# Patient Record
Sex: FEMALE | Race: WHITE | NOT HISPANIC OR LATINO | Employment: OTHER | URBAN - METROPOLITAN AREA
[De-identification: names, ages, dates, MRNs, and addresses within clinical notes are randomized per-mention and may not be internally consistent; named-entity substitution may affect disease eponyms.]

---

## 2017-10-10 ENCOUNTER — LAB VISIT (OUTPATIENT)
Dept: LAB | Facility: HOSPITAL | Age: 57
End: 2017-10-10
Attending: FAMILY MEDICINE
Payer: MEDICARE

## 2017-10-10 ENCOUNTER — OFFICE VISIT (OUTPATIENT)
Dept: INTERNAL MEDICINE | Facility: CLINIC | Age: 57
End: 2017-10-10
Payer: MEDICARE

## 2017-10-10 VITALS
WEIGHT: 251.56 LBS | SYSTOLIC BLOOD PRESSURE: 144 MMHG | TEMPERATURE: 97 F | DIASTOLIC BLOOD PRESSURE: 96 MMHG | BODY MASS INDEX: 40.43 KG/M2 | HEIGHT: 66 IN

## 2017-10-10 DIAGNOSIS — A60.00 GENITAL HERPES SIMPLEX, UNSPECIFIED SITE: ICD-10-CM

## 2017-10-10 DIAGNOSIS — M21.371 RIGHT FOOT DROP: ICD-10-CM

## 2017-10-10 DIAGNOSIS — Z91.81 RISK FOR FALLS: ICD-10-CM

## 2017-10-10 DIAGNOSIS — F41.9 ANXIETY: ICD-10-CM

## 2017-10-10 DIAGNOSIS — R32 URINARY INCONTINENCE, UNSPECIFIED TYPE: ICD-10-CM

## 2017-10-10 DIAGNOSIS — G57.91 NEUROPATHY OF RIGHT FOOT: ICD-10-CM

## 2017-10-10 DIAGNOSIS — R29.898 WEAKNESS OF RIGHT LOWER EXTREMITY: ICD-10-CM

## 2017-10-10 DIAGNOSIS — F41.9 ANXIETY: Primary | ICD-10-CM

## 2017-10-10 DIAGNOSIS — B37.2 CANDIDAL DERMATITIS: ICD-10-CM

## 2017-10-10 DIAGNOSIS — N39.0 RECURRENT UTI: ICD-10-CM

## 2017-10-10 LAB
ALBUMIN SERPL BCP-MCNC: 3.6 G/DL
ALP SERPL-CCNC: 99 U/L
ALT SERPL W/O P-5'-P-CCNC: 11 U/L
ANION GAP SERPL CALC-SCNC: 9 MMOL/L
AST SERPL-CCNC: 21 U/L
BASOPHILS # BLD AUTO: 0.04 K/UL
BASOPHILS NFR BLD: 0.6 %
BILIRUB SERPL-MCNC: 0.6 MG/DL
BUN SERPL-MCNC: 11 MG/DL
CALCIUM SERPL-MCNC: 9.9 MG/DL
CHLORIDE SERPL-SCNC: 105 MMOL/L
CHOLEST SERPL-MCNC: 191 MG/DL
CHOLEST/HDLC SERPL: 4.2 {RATIO}
CO2 SERPL-SCNC: 28 MMOL/L
CREAT SERPL-MCNC: 1 MG/DL
DIFFERENTIAL METHOD: NORMAL
EOSINOPHIL # BLD AUTO: 0.3 K/UL
EOSINOPHIL NFR BLD: 4.5 %
ERYTHROCYTE [DISTWIDTH] IN BLOOD BY AUTOMATED COUNT: 14.2 %
EST. GFR  (AFRICAN AMERICAN): >60 ML/MIN/1.73 M^2
EST. GFR  (NON AFRICAN AMERICAN): >60 ML/MIN/1.73 M^2
GLUCOSE SERPL-MCNC: 84 MG/DL
HCT VFR BLD AUTO: 39.4 %
HDLC SERPL-MCNC: 46 MG/DL
HDLC SERPL: 24.1 %
HGB BLD-MCNC: 13.5 G/DL
LDLC SERPL CALC-MCNC: 128 MG/DL
LYMPHOCYTES # BLD AUTO: 1.9 K/UL
LYMPHOCYTES NFR BLD: 29.3 %
MCH RBC QN AUTO: 30.1 PG
MCHC RBC AUTO-ENTMCNC: 34.3 G/DL
MCV RBC AUTO: 88 FL
MONOCYTES # BLD AUTO: 0.4 K/UL
MONOCYTES NFR BLD: 6.3 %
NEUTROPHILS # BLD AUTO: 3.8 K/UL
NEUTROPHILS NFR BLD: 59.3 %
NONHDLC SERPL-MCNC: 145 MG/DL
PLATELET # BLD AUTO: 270 K/UL
PMV BLD AUTO: 10.3 FL
POTASSIUM SERPL-SCNC: 4.4 MMOL/L
PROT SERPL-MCNC: 7.6 G/DL
RBC # BLD AUTO: 4.49 M/UL
SODIUM SERPL-SCNC: 142 MMOL/L
TRIGL SERPL-MCNC: 85 MG/DL
TSH SERPL DL<=0.005 MIU/L-ACNC: 2.27 UIU/ML
WBC # BLD AUTO: 6.38 K/UL

## 2017-10-10 PROCEDURE — 99999 PR PBB SHADOW E&M-NEW PATIENT-LVL III: CPT | Mod: PBBFAC,,, | Performed by: FAMILY MEDICINE

## 2017-10-10 PROCEDURE — 80061 LIPID PANEL: CPT

## 2017-10-10 PROCEDURE — 85025 COMPLETE CBC W/AUTO DIFF WBC: CPT

## 2017-10-10 PROCEDURE — 36415 COLL VENOUS BLD VENIPUNCTURE: CPT | Mod: PO

## 2017-10-10 PROCEDURE — 80053 COMPREHEN METABOLIC PANEL: CPT

## 2017-10-10 PROCEDURE — 99203 OFFICE O/P NEW LOW 30 MIN: CPT | Mod: S$GLB,,, | Performed by: FAMILY MEDICINE

## 2017-10-10 PROCEDURE — 83036 HEMOGLOBIN GLYCOSYLATED A1C: CPT

## 2017-10-10 PROCEDURE — 84443 ASSAY THYROID STIM HORMONE: CPT

## 2017-10-10 PROCEDURE — 86703 HIV-1/HIV-2 1 RESULT ANTBDY: CPT

## 2017-10-10 RX ORDER — TOLTERODINE 4 MG/1
1 CAPSULE, EXTENDED RELEASE ORAL NIGHTLY
COMMUNITY
Start: 2017-10-05 | End: 2017-10-10 | Stop reason: SDUPTHER

## 2017-10-10 RX ORDER — PREGABALIN 75 MG/1
75 CAPSULE ORAL NIGHTLY
COMMUNITY
End: 2017-10-10 | Stop reason: SDUPTHER

## 2017-10-10 RX ORDER — NAPROXEN 500 MG/1
1000 TABLET ORAL NIGHTLY
COMMUNITY
Start: 2017-10-05 | End: 2018-03-12 | Stop reason: SDUPTHER

## 2017-10-10 RX ORDER — TRIAMCINOLONE ACETONIDE 1 MG/G
CREAM TOPICAL
COMMUNITY
End: 2019-10-10

## 2017-10-10 RX ORDER — NYSTATIN AND TRIAMCINOLONE ACETONIDE 100000; 1 [USP'U]/G; MG/G
CREAM TOPICAL 2 TIMES DAILY
COMMUNITY
End: 2019-02-19 | Stop reason: SDUPTHER

## 2017-10-10 RX ORDER — PREGABALIN 75 MG/1
75 CAPSULE ORAL NIGHTLY
Qty: 90 CAPSULE | Refills: 0 | Status: SHIPPED | OUTPATIENT
Start: 2017-10-10 | End: 2017-10-20 | Stop reason: SDUPTHER

## 2017-10-10 RX ORDER — MUPIROCIN 20 MG/G
OINTMENT TOPICAL 3 TIMES DAILY
COMMUNITY
End: 2018-04-18 | Stop reason: SDUPTHER

## 2017-10-10 RX ORDER — VALACYCLOVIR HYDROCHLORIDE 1 G/1
1 TABLET, FILM COATED ORAL DAILY
COMMUNITY
Start: 2017-10-05 | End: 2017-10-10 | Stop reason: SDUPTHER

## 2017-10-10 RX ORDER — TIZANIDINE HYDROCHLORIDE 4 MG/1
2 CAPSULE, GELATIN COATED ORAL NIGHTLY
COMMUNITY
End: 2017-10-10 | Stop reason: SDUPTHER

## 2017-10-10 RX ORDER — FLUCONAZOLE 200 MG/1
200 TABLET ORAL
Qty: 24 TABLET | Refills: 0 | Status: SHIPPED | OUTPATIENT
Start: 2017-10-12 | End: 2018-02-14 | Stop reason: SDUPTHER

## 2017-10-10 RX ORDER — HYDROCORTISONE 25 MG/G
CREAM TOPICAL
COMMUNITY
End: 2019-10-10 | Stop reason: SDUPTHER

## 2017-10-10 RX ORDER — FLUCONAZOLE 200 MG/1
200 TABLET ORAL
COMMUNITY
End: 2017-10-10 | Stop reason: SDUPTHER

## 2017-10-10 RX ORDER — TIZANIDINE HYDROCHLORIDE 4 MG/1
2 CAPSULE, GELATIN COATED ORAL NIGHTLY
Qty: 180 CAPSULE | Refills: 0 | Status: SHIPPED | OUTPATIENT
Start: 2017-10-10 | End: 2018-01-22 | Stop reason: SDUPTHER

## 2017-10-10 RX ORDER — VALACYCLOVIR HYDROCHLORIDE 1 G/1
1000 TABLET, FILM COATED ORAL DAILY
Qty: 90 TABLET | Refills: 0 | Status: SHIPPED | OUTPATIENT
Start: 2017-10-10 | End: 2018-03-12 | Stop reason: SDUPTHER

## 2017-10-10 RX ORDER — NITROFURANTOIN 25; 75 MG/1; MG/1
100 CAPSULE ORAL NIGHTLY
COMMUNITY
End: 2017-10-10 | Stop reason: SDUPTHER

## 2017-10-10 RX ORDER — TOLTERODINE 4 MG/1
4 CAPSULE, EXTENDED RELEASE ORAL NIGHTLY
Qty: 90 CAPSULE | Refills: 0 | Status: SHIPPED | OUTPATIENT
Start: 2017-10-10 | End: 2018-02-07 | Stop reason: SDUPTHER

## 2017-10-10 RX ORDER — CLONAZEPAM 1 MG/1
1 TABLET ORAL NIGHTLY
COMMUNITY
Start: 2017-10-05 | End: 2017-10-10 | Stop reason: SDUPTHER

## 2017-10-10 RX ORDER — NYSTATIN 100000 [USP'U]/G
POWDER TOPICAL
COMMUNITY
End: 2018-02-14 | Stop reason: SDUPTHER

## 2017-10-10 RX ORDER — CLONAZEPAM 1 MG/1
1 TABLET ORAL NIGHTLY
Qty: 90 TABLET | Refills: 0 | Status: SHIPPED | OUTPATIENT
Start: 2017-10-10 | End: 2018-01-18 | Stop reason: SDUPTHER

## 2017-10-10 RX ORDER — NITROFURANTOIN 25; 75 MG/1; MG/1
100 CAPSULE ORAL NIGHTLY
Qty: 90 CAPSULE | Refills: 0 | Status: SHIPPED | OUTPATIENT
Start: 2017-10-10 | End: 2018-08-06 | Stop reason: SDUPTHER

## 2017-10-10 NOTE — PROGRESS NOTES
Subjective:   Patient ID: Rivka Jurado is a 57 y.o. female.  Chief Complaint:  Establish Care and Anxiety    Presents to establish care.    Reports multiple chronic medical conditions.  Needs physical therapy referral for chronic foot drop with overall generalized weakness, decreased conditioning, gait instability.  Needs 90 day prescription of all medication sent to [facility]  Request completion of form to obtain motorized wheelchair/scooter.  Needs referral to psychiatrist.        Current Outpatient Prescriptions:     clonazePAM (KLONOPIN) 1 MG tablet, Take 1 tablet (1 mg total) by mouth every evening., Disp: 90 tablet, Rfl: 0    fluconazole (DIFLUCAN) 200 MG Tab, Take 1 tablet (200 mg total) by mouth twice a week., Disp: 24 tablet, Rfl: 0    hydrocortisone (ANUSOL-HC) 2.5 % rectal cream, Place rectally as needed for Hemorrhoids., Disp: , Rfl:     mupirocin (BACTROBAN) 2 % ointment, Apply topically 3 (three) times daily., Disp: , Rfl:     naproxen (NAPROSYN) 500 MG tablet, Take 1 tablet by mouth 2 (two) times daily., Disp: , Rfl:     nitrofurantoin, macrocrystal-monohydrate, (MACROBID) 100 MG capsule, Take 1 capsule (100 mg total) by mouth every evening., Disp: 90 capsule, Rfl: 0    nystatin (MYCOSTATIN) powder, Apply topically as needed., Disp: , Rfl:     nystatin-triamcinolone (MYCOLOG II) cream, Apply topically 2 (two) times daily., Disp: , Rfl:     pregabalin (LYRICA) 75 MG capsule, Take 1 capsule (75 mg total) by mouth every evening., Disp: 90 capsule, Rfl: 0    tizanidine 4 mg Cap, Take 2 capsules by mouth every evening., Disp: 180 capsule, Rfl: 0    tolterodine (DETROL LA) 4 MG 24 hr capsule, Take 1 capsule (4 mg total) by mouth every evening., Disp: 90 capsule, Rfl: 0    triamcinolone acetonide 0.1% (KENALOG) 0.1 % cream, Apply topically as needed., Disp: , Rfl:     valacyclovir (VALTREX) 1000 MG tablet, Take 1 tablet (1,000 mg total) by mouth once daily., Disp: 90 tablet, Rfl: 0     Review of  "Systems   Constitutional: Positive for fatigue. Negative for activity change, appetite change, chills, diaphoresis, fever and unexpected weight change.   HENT: Positive for congestion, ear pain, nosebleeds (Recurrent nasal sores), postnasal drip and rhinorrhea. Negative for dental problem, ear discharge, facial swelling, hearing loss, mouth sores, sinus pain, sinus pressure, sneezing, sore throat, tinnitus, trouble swallowing and voice change.    Eyes: Negative for pain, discharge, redness, itching and visual disturbance.   Respiratory: Positive for shortness of breath. Negative for cough, chest tightness and wheezing.    Cardiovascular: Positive for leg swelling. Negative for chest pain and palpitations.   Gastrointestinal: Negative for abdominal pain, blood in stool, constipation, diarrhea, nausea and vomiting.   Endocrine: Negative for polydipsia, polyphagia and polyuria.   Genitourinary: Positive for difficulty urinating and urgency. Negative for dyspareunia, dysuria, enuresis, flank pain, frequency, genital sores and hematuria.   Musculoskeletal: Positive for arthralgias, back pain, gait problem, joint swelling, myalgias, neck pain and neck stiffness.   Skin: Negative for rash.   Neurological: Positive for weakness and numbness. Negative for dizziness, tremors, seizures, syncope, facial asymmetry, speech difficulty, light-headedness and headaches.   Hematological: Negative for adenopathy. Bruises/bleeds easily.   Psychiatric/Behavioral: Positive for decreased concentration, dysphoric mood and sleep disturbance. Negative for agitation, behavioral problems, confusion, hallucinations, self-injury and suicidal ideas. The patient is not nervous/anxious and is not hyperactive.      Objective:   BP (!) 144/96 (BP Location: Right arm, Patient Position: Sitting, BP Method: Large (Manual))   Temp 97 °F (36.1 °C) (Tympanic)   Ht 5' 5.5" (1.664 m)   Wt 114.1 kg (251 lb 8.7 oz)   BMI 41.22 kg/m²     Physical Exam "   Constitutional: She is oriented to person, place, and time. Vital signs are normal. She appears well-developed and well-nourished. No distress.   Neck: No JVD present.   Cardiovascular: Normal rate, regular rhythm and normal heart sounds.  Exam reveals no gallop and no friction rub.    No murmur heard.  Pulmonary/Chest: Effort normal and breath sounds normal. She has no wheezes. She has no rhonchi. She has no rales.   Abdominal: Soft. She exhibits no distension. There is no tenderness.   Musculoskeletal: She exhibits no edema.   Neurological: She is alert and oriented to person, place, and time. She displays atrophy. She displays no tremor. A sensory deficit is present. She exhibits abnormal muscle tone. She displays a negative Romberg sign. She displays no seizure activity. Coordination and gait abnormal.   Right Foot Drop   Skin: Skin is warm and dry. No rash noted.   Psychiatric: She has a normal mood and affect. Judgment and thought content normal. Her mood appears not anxious. Her affect is not angry, not blunt, not labile and not inappropriate. Her speech is tangential. Her speech is not rapid and/or pressured, not delayed and not slurred. She is not agitated, not aggressive, not hyperactive, not slowed, not withdrawn, not actively hallucinating and not combative. Cognition and memory are normal. She does not exhibit a depressed mood. She is communicative. She is inattentive.   Nursing note and vitals reviewed.    Assessment:     1. Anxiety    2. Recurrent UTI    3. Urinary incontinence, unspecified type    4. Right foot drop    5. Genital herpes simplex, unspecified site    6. Candidal dermatitis    7. Neuropathy of right foot    8. Risk for falls    9. Weakness of right lower extremity      Plan:   Anxiety  -     clonazePAM (KLONOPIN) 1 MG tablet; Take 1 tablet (1 mg total) by mouth every evening.  Dispense: 90 tablet; Refill: 0  -     CBC auto differential; Future; Expected date: 10/10/2017  -      Comprehensive metabolic panel; Future; Expected date: 10/10/2017  -     Lipid panel; Future; Expected date: 10/10/2017  -     TSH; Future; Expected date: 10/10/2017  -     Ambulatory referral to Psychiatry  Check labs.  Treat as indicated.  Referral to psychiatry.    Recurrent UTI  Urinary incontinence, unspecified type  -     tolterodine (DETROL LA) 4 MG 24 hr capsule; Take 1 capsule (4 mg total) by mouth every evening.  Dispense: 90 capsule; Refill: 0  -     Urinalysis; Future; Expected date: 10/10/2017        -     nitrofurantoin, macrocrystal-monohydrate, (MACROBID) 100 MG capsule; Take 1 capsule (100 mg total) by mouth every evening.  Dispense: 90 capsule; Refill: 0  -     CULTURE, URINE; Future; Expected date: 10/10/2017  Continue Detrol LA.  Continue antibiotic daily prophylaxis.  Rule out chronic underlying infection.      Right foot drop  -     pregabalin (LYRICA) 75 MG capsule; Take 1 capsule (75 mg total) by mouth every evening.  Dispense: 90 capsule; Refill: 0  -     tizanidine 4 mg Cap; Take 2 capsules by mouth every evening.  Dispense: 180 capsule; Refill: 0  -     Ambulatory Referral to Physical/Occupational Therapy  Referral to PT.  Refill medications.  Form for [a]list gamesooter filled out.    Genital herpes simplex, unspecified site  -     valacyclovir (VALTREX) 1000 MG tablet; Take 1 tablet (1,000 mg total) by mouth once daily.  Dispense: 90 tablet; Refill: 0  -     HIV-1 and HIV-2 antibodies; Future; Expected date: 10/10/2017  Stable.  No recurrent outbreaks on preventative dose.    Candidal dermatitis  -     fluconazole (DIFLUCAN) 200 MG Tab; Take 1 tablet (200 mg total) by mouth twice a week.  Dispense: 24 tablet; Refill: 0  -     Hemoglobin A1c; Future; Expected date: 10/10/2017    Neuropathy of right foot  Risk for falls  Weakness of right lower extremity  -     pregabalin (LYRICA) 75 MG capsule; Take 1 capsule (75 mg total) by mouth every evening.  Dispense: 90 capsule; Refill: 0  -      tizanidine 4 mg Cap; Take 2 capsules by mouth every evening.  Dispense: 180 capsule; Refill: 0    RTC 1 month

## 2017-10-11 ENCOUNTER — TELEPHONE (OUTPATIENT)
Dept: INTERNAL MEDICINE | Facility: CLINIC | Age: 57
End: 2017-10-11

## 2017-10-11 DIAGNOSIS — J34.89 SORE IN NOSE: Primary | ICD-10-CM

## 2017-10-11 LAB
ESTIMATED AVG GLUCOSE: 105 MG/DL
HBA1C MFR BLD HPLC: 5.3 %

## 2017-10-11 NOTE — TELEPHONE ENCOUNTER
Notified pt. Pt verbalized understanding.    Pt stated that she forgot to get Dr. Singh to put in a referral to ENT for her recurrent sinus issues and sores in her nose. Pt use to see Dr. Reed but they no longer take her insurance. Told pt that I would discuss with Dr. Singh and return her call tomorrow. Pt verbalized understanding.

## 2017-10-11 NOTE — TELEPHONE ENCOUNTER
----- Message from Kennedy Singh MD sent at 10/11/2017  7:21 AM CDT -----  Sugar, Kidney, Liver, and Electrolyte tests are all normal.  Blood Counts are normal. No significant anemia.  Thyroid testing is normal.  Cholesterol tests are normal. Your 10-year risk of a heart disease or stroke is 3.5%  Aspirin daily is not recommended. A statin cholesterol medication is not recommended.  A1c is in a normal range. No Prediabetes or Diabetes  HIV pending

## 2017-10-12 ENCOUNTER — TELEPHONE (OUTPATIENT)
Dept: INTERNAL MEDICINE | Facility: CLINIC | Age: 57
End: 2017-10-12

## 2017-10-12 LAB — HIV 1+2 AB+HIV1 P24 AG SERPL QL IA: NEGATIVE

## 2017-10-12 NOTE — TELEPHONE ENCOUNTER
----- Message from Kennedy Singh MD sent at 10/12/2017 12:58 PM CDT -----  HIV testing is negative

## 2017-10-12 NOTE — TELEPHONE ENCOUNTER
----- Message from Kennedy Singh MD sent at 10/12/2017 12:58 PM CDT -----  Urinalysis normal.  Urine culture negative.  No infection.

## 2017-10-13 PROBLEM — G57.91 NEUROPATHY OF RIGHT FOOT: Status: ACTIVE | Noted: 2017-10-13

## 2017-10-13 PROBLEM — N39.0 RECURRENT UTI: Status: ACTIVE | Noted: 2017-10-13

## 2017-10-13 PROBLEM — B37.2 CANDIDAL DERMATITIS: Status: ACTIVE | Noted: 2017-10-13

## 2017-10-13 PROBLEM — M21.371 RIGHT FOOT DROP: Status: ACTIVE | Noted: 2017-10-13

## 2017-10-13 PROBLEM — R32 URINARY INCONTINENCE: Status: ACTIVE | Noted: 2017-10-13

## 2017-10-13 PROBLEM — A60.00 GENITAL HERPES SIMPLEX: Status: ACTIVE | Noted: 2017-10-13

## 2017-10-13 PROBLEM — Z91.81 RISK FOR FALLS: Status: ACTIVE | Noted: 2017-10-13

## 2017-10-13 PROBLEM — F41.9 ANXIETY: Status: ACTIVE | Noted: 2017-10-13

## 2017-10-13 PROBLEM — R29.898 WEAKNESS OF RIGHT LOWER EXTREMITY: Status: ACTIVE | Noted: 2017-10-13

## 2017-10-18 ENCOUNTER — TELEPHONE (OUTPATIENT)
Dept: INTERNAL MEDICINE | Facility: CLINIC | Age: 57
End: 2017-10-18

## 2017-10-18 DIAGNOSIS — I10 HYPERTENSION, ESSENTIAL: Primary | ICD-10-CM

## 2017-10-18 RX ORDER — BENAZEPRIL HYDROCHLORIDE 20 MG/1
20 TABLET ORAL DAILY
Qty: 90 TABLET | Refills: 0 | Status: SHIPPED | OUTPATIENT
Start: 2017-10-18 | End: 2017-11-15 | Stop reason: SDUPTHER

## 2017-10-18 NOTE — TELEPHONE ENCOUNTER
Notified pt. Pt verbalized understanding.     Pt also stated that insurance is not wanting to pay for Detrol LA extended release. Advised pt to contact pharmacy and have them send office a prior auth request. Pt verbalized understanding.

## 2017-10-18 NOTE — TELEPHONE ENCOUNTER
S/w Breanna at [facility]. She stated that she can see where it was sent in electronically on 10/10/17. She is unsure if they will be able to retrieve it and use it. She stated that she will call clinic back if a new rx is needed.

## 2017-10-18 NOTE — TELEPHONE ENCOUNTER
S/w Breanna at "[facility]. She stated that when the rx for Lyrica was sent to pharmacy it showed that Dr. Singh was not authorized to sent in controlled substances via [employer]. Told Breanna that I would discuss with Dr. Singh and return her call tomorrow.

## 2017-10-18 NOTE — TELEPHONE ENCOUNTER
MD Mary Jo Chaves LPN   Caller: Unspecified (Today, 11:06 AM)             Info from original order:   Date/Time Signed: 10/10/2017 14:26   E-Prescribing Status: Receipt confirmed by pharmacy (10/10/2017  2:28 PM CDT)     Are they needing a hard copy of the script?

## 2017-10-18 NOTE — TELEPHONE ENCOUNTER
----- Message from Myrna Gallego sent at 10/18/2017  8:21 AM CDT -----  Contact: PT   PT states her blood pressure is high 169/118, 168/100. PT use to take Lotenfin 20mg and wants to know if she should start taking again, and if so does she need an appointment to get the medication. PT does not have blood pressure pills. Request call back at .[phone] (home)

## 2017-10-18 NOTE — TELEPHONE ENCOUNTER
MD Mary Jo Chaves LPN Cc: BRITTANY REEDER Staff   Caller: Unspecified (Today,  8:34 AM)             Sent prescription for 1 pill daily   Needs appt for 1 month if not already scheduled

## 2017-10-18 NOTE — TELEPHONE ENCOUNTER
----- Message from Annabel Phelps sent at 10/18/2017 10:57 AM CDT -----  Contact: [facility]  The caller request the RX Lyrica to be re fax because they did not receive it...    [facility] [place] - [place] - [address] [facility]  [address] [facility]  [place] [address]  Phone: [phone] Fax: [phone]

## 2017-10-19 ENCOUNTER — OFFICE VISIT (OUTPATIENT)
Dept: OTOLARYNGOLOGY | Facility: CLINIC | Age: 57
End: 2017-10-19
Payer: MEDICARE

## 2017-10-19 VITALS
HEART RATE: 75 BPM | WEIGHT: 252.19 LBS | BODY MASS INDEX: 40.53 KG/M2 | RESPIRATION RATE: 18 BRPM | TEMPERATURE: 97 F | HEIGHT: 66 IN | DIASTOLIC BLOOD PRESSURE: 80 MMHG | SYSTOLIC BLOOD PRESSURE: 169 MMHG

## 2017-10-19 DIAGNOSIS — J32.9 RECURRENT SINUSITIS: Primary | ICD-10-CM

## 2017-10-19 DIAGNOSIS — J34.89 NASAL SORE: ICD-10-CM

## 2017-10-19 PROCEDURE — 99204 OFFICE O/P NEW MOD 45 MIN: CPT | Mod: S$GLB,,, | Performed by: PHYSICIAN ASSISTANT

## 2017-10-19 PROCEDURE — 99499 UNLISTED E&M SERVICE: CPT | Mod: S$GLB,,, | Performed by: PHYSICIAN ASSISTANT

## 2017-10-19 PROCEDURE — 99999 PR PBB SHADOW E&M-EST. PATIENT-LVL IV: CPT | Mod: PBBFAC,,, | Performed by: PHYSICIAN ASSISTANT

## 2017-10-19 RX ORDER — MUPIROCIN 20 MG/G
OINTMENT TOPICAL 2 TIMES DAILY
Qty: 15 G | Refills: 3 | Status: SHIPPED | OUTPATIENT
Start: 2017-10-19 | End: 2017-10-30

## 2017-10-19 RX ORDER — FLUTICASONE PROPIONATE 50 MCG
2 SPRAY, SUSPENSION (ML) NASAL DAILY
Qty: 1 BOTTLE | Refills: 12 | Status: SHIPPED | OUTPATIENT
Start: 2017-10-19 | End: 2018-02-22 | Stop reason: SDUPTHER

## 2017-10-19 RX ORDER — CEFDINIR 300 MG/1
300 CAPSULE ORAL 2 TIMES DAILY
Qty: 28 CAPSULE | Refills: 0 | Status: SHIPPED | OUTPATIENT
Start: 2017-10-19 | End: 2017-11-02

## 2017-10-19 NOTE — PROGRESS NOTES
Subjective:       Patient ID: Rivka Jurado is a 57 y.o. female.    Chief Complaint: Sinus Problem (soreness in nose)    Patient is a very pleasant 57 year old female here to see me today for the first time in consultation at the request of Dr. Singh for evaluation of sinus issues.  She reports frequent sinus infections (3-4 infections per year).   Moved from [place] in July.  She was treated with Z-romeo in July by ENT in [place]; had no improvement and then changed to another antibiotic (? Unsure what).  She then saw Dr. Reed (ENT) and was treated with Augmentin and then Biaxin (August) and felt better until 10 days ago.  She now complains of right facial pain and pressure; nasal congestion and purulent nasal drainage.  She's able to swab thick discolored (yellow green) mucus from her nose.  She also gets recurrent sores in her nose and has been using topical Bactroban; denies epistaxis.  Denies any change in sense of smell.  Denies previous sinus surgery or allergy testing.  She's been having terrible headaches for past week.  She has dogs and a cat in her home.  She's been using Astelin since August.  She's not tried Flonase.  She also mentions treatment with Bactrim DS recently for toenail infection.  Denies fever.  She has history of Rheumatoid arthritis but has not seen Rheumatology in [facility] (previous Rheum in [place]).  She's not had recent sinus imaging.      Review of Systems   Constitutional: Positive for fatigue. Negative for activity change, appetite change and fever.   HENT: Positive for congestion, postnasal drip, rhinorrhea, sinus pain, sinus pressure, sneezing (occasional) and voice change (hoarse today). Negative for ear discharge, ear pain, hearing loss, nosebleeds, sore throat, tinnitus and trouble swallowing.    Eyes: Positive for itching (occasional). Negative for discharge.   Respiratory: Negative for cough and shortness of breath.    Cardiovascular: Negative for chest pain.    Gastrointestinal: Negative for diarrhea, nausea and vomiting.   Musculoskeletal: Positive for arthralgias and gait problem (uses walker). Negative for neck pain.   Allergic/Immunologic: Negative for food allergies.   Neurological: Positive for weakness (right foot drop) and headaches. Negative for light-headedness.   Hematological: Negative for adenopathy.   Psychiatric/Behavioral: Negative for confusion.       Objective:      Physical Exam   Constitutional: She is oriented to person, place, and time. She appears well-developed and well-nourished. She is cooperative. No distress.   HENT:   Head: Normocephalic and atraumatic.   Right Ear: Tympanic membrane, external ear and ear canal normal. No middle ear effusion.   Left Ear: Tympanic membrane, external ear and ear canal normal.  No middle ear effusion.   Nose: Mucosal edema present. No rhinorrhea, nasal deformity or septal deviation. No epistaxis (no ectatic vessels or crusting seen on septum; dry nasal mucosa). Right sinus exhibits maxillary sinus tenderness and frontal sinus tenderness. Left sinus exhibits no maxillary sinus tenderness and no frontal sinus tenderness.   Mouth/Throat: Uvula is midline, oropharynx is clear and moist and mucous membranes are normal. Mucous membranes are not pale and not dry. No trismus in the jaw. Abnormal dentition (missing several upper molars). No uvula swelling. No oropharyngeal exudate or posterior oropharyngeal erythema. Tonsils are 0 on the right. Tonsils are 0 on the left.   Eyes: Conjunctivae, EOM and lids are normal. Pupils are equal, round, and reactive to light. Right eye exhibits no chemosis. Left eye exhibits no chemosis. Right conjunctiva is not injected. Left conjunctiva is not injected. No scleral icterus. Right eye exhibits normal extraocular motion and no nystagmus. Left eye exhibits normal extraocular motion and no nystagmus.   eyeglasses   Neck: Trachea normal and phonation normal. No tracheal tenderness  present. No tracheal deviation present. No thyroid mass and no thyromegaly present.   Cardiovascular: Intact distal pulses.    Pulmonary/Chest: Effort normal. No stridor. No respiratory distress.   Abdominal: She exhibits no distension.   Lymphadenopathy:        Head (right side): No submental, no submandibular, no preauricular and no posterior auricular adenopathy present.        Head (left side): No submental, no submandibular, no preauricular and no posterior auricular adenopathy present.     She has no cervical adenopathy.   Neurological: She is alert and oriented to person, place, and time. No cranial nerve deficit.   Skin: Skin is warm and dry. No rash noted. No erythema.   Psychiatric: She has a normal mood and affect. Her behavior is normal.       Assessment:       1. Recurrent sinusitis    2. Nasal sore        Plan:         Recommend Omnicef x 10 days.  If no improvement, recommend CT sinus for further evaluation as she's recently been on numerous antibiotics.   The patient was given a prescription for a steroid nasal spray, and we discussed in detail the proper mechanism of use directing the spray away from the nasal septum.  In addition, we also discussed that it will take two to three weeks of daily use to achieve maximal effectiveness.  Also recommend frequent nasal saline spray and continued use of topical Bactroban.  RTC in 3 weeks with Dr. Fink or Dr. Puente for recheck and possible nasal endoscopy.  Also recommend she get established with Rheumatology for her RA.  We discussed that rheumatologic conditions can have associated nasal dryness.  We discussed different strategies to help increase her nasal moisture, including the use of saline spray throughout the day and a humidifier at night. In addition, I gave the patient a prescription for Bactroban to be used twice daily for two weeks.  Also discussed that Astelin spray can be quite drying and she may want to use sparingly.    Thanks for the  referral Dr. Singh.  Report returned via EPIC.

## 2017-10-19 NOTE — LETTER
October 19, 2017      Kennedy Singh MD  [address]  [place] [address]           [facility]  [address] [place] [address]  Phone: [phone]  Fax: [phone]          Patient: Rivka Jurado   MR Number: [id number]   YOB: 1960   Date of Visit: 10/19/2017       Dear Dr. Kennedy Singh:    Thank you for referring Rivka Jurado to me for evaluation. Attached you will find relevant portions of my assessment and plan of care.    If you have questions, please do not hesitate to call me. I look forward to following Rivka Jurado along with you.    Sincerely,    Allison Seo PA-C    Enclosure  CC:  No Recipients    If you would like to receive this communication electronically, please contact [email or web address] or [phone] to request more information on [facility] access.    For providers and/or their staff who would like to refer a patient to [facility], please contact us through our one-stop-shop provider referral line, [facility], at [phone].    If you feel you have received this communication in error or would no longer like to receive these types of communications, please e-mail [email or web address]

## 2017-10-20 ENCOUNTER — TELEPHONE (OUTPATIENT)
Dept: INTERNAL MEDICINE | Facility: CLINIC | Age: 57
End: 2017-10-20

## 2017-10-20 DIAGNOSIS — G57.91 NEUROPATHY OF RIGHT FOOT: ICD-10-CM

## 2017-10-20 DIAGNOSIS — M21.371 RIGHT FOOT DROP: ICD-10-CM

## 2017-10-20 RX ORDER — PREGABALIN 75 MG/1
75 CAPSULE ORAL NIGHTLY
Qty: 90 CAPSULE | Refills: 0 | Status: SHIPPED | OUTPATIENT
Start: 2017-10-20 | End: 2017-11-09 | Stop reason: SDUPTHER

## 2017-10-20 NOTE — TELEPHONE ENCOUNTER
S/w pharmacy. They received rx and medication will be filled. Notified pt. Pt verbalized understanding.

## 2017-10-20 NOTE — TELEPHONE ENCOUNTER
MD Mary Jo Chaves LPN   Caller: Unspecified (2 days ago,  3:21 PM)             Reordered prescription.

## 2017-10-20 NOTE — TELEPHONE ENCOUNTER
----- Message from Sherly Jordan sent at 10/19/2017  1:57 PM CDT -----  Contact: [facility] [phone]  States that pt is saying she should have gotten a rx for lyrica. States that they did not receive rx. States that pt is out of medication and in pain.  Please call back at [phone]//thank you acc

## 2017-10-20 NOTE — TELEPHONE ENCOUNTER
----- Message from Jaspreet Ken sent at 10/19/2017  4:14 PM CDT -----  Contact: Pt   Pt called to check the status of refill request that was sen earlier pt is in pain needed pain medication called in please..[phone] (home)

## 2017-10-26 ENCOUNTER — TELEPHONE (OUTPATIENT)
Dept: INTERNAL MEDICINE | Facility: CLINIC | Age: 57
End: 2017-10-26

## 2017-10-26 NOTE — TELEPHONE ENCOUNTER
Pt stated that she will be sending a letter appeal to [employer] for her wheelchair and detrol LAAyanna

## 2017-10-26 NOTE — TELEPHONE ENCOUNTER
----- Message from Rosalba Lacy sent at 10/26/2017 10:48 AM CDT -----  Would like to speak to nurse about denile from [facility]. Please call back at [phone]. thanks

## 2017-10-30 ENCOUNTER — OFFICE VISIT (OUTPATIENT)
Dept: INTERNAL MEDICINE | Facility: CLINIC | Age: 57
End: 2017-10-30
Payer: MEDICARE

## 2017-10-30 VITALS
TEMPERATURE: 98 F | HEIGHT: 66 IN | BODY MASS INDEX: 40.07 KG/M2 | WEIGHT: 249.31 LBS | DIASTOLIC BLOOD PRESSURE: 88 MMHG | SYSTOLIC BLOOD PRESSURE: 142 MMHG

## 2017-10-30 DIAGNOSIS — F41.9 ANXIETY: ICD-10-CM

## 2017-10-30 DIAGNOSIS — R20.2 NUMBNESS AND TINGLING OF RIGHT HAND: Primary | ICD-10-CM

## 2017-10-30 DIAGNOSIS — R20.0 NUMBNESS AND TINGLING OF RIGHT HAND: Primary | ICD-10-CM

## 2017-10-30 PROCEDURE — 99499 UNLISTED E&M SERVICE: CPT | Mod: S$GLB,,, | Performed by: FAMILY MEDICINE

## 2017-10-30 PROCEDURE — 99999 PR PBB SHADOW E&M-EST. PATIENT-LVL III: CPT | Mod: PBBFAC,,, | Performed by: FAMILY MEDICINE

## 2017-10-30 PROCEDURE — 99214 OFFICE O/P EST MOD 30 MIN: CPT | Mod: S$GLB,,, | Performed by: FAMILY MEDICINE

## 2017-10-30 RX ORDER — METHYLPREDNISOLONE 4 MG/1
TABLET ORAL
Qty: 1 PACKAGE | Refills: 0 | Status: SHIPPED | OUTPATIENT
Start: 2017-10-30 | End: 2017-11-09

## 2017-10-30 RX ORDER — VENLAFAXINE HYDROCHLORIDE 75 MG/1
75 CAPSULE, EXTENDED RELEASE ORAL DAILY
Qty: 90 CAPSULE | Refills: 0 | Status: SHIPPED | OUTPATIENT
Start: 2017-10-30 | End: 2018-01-18 | Stop reason: ALTCHOICE

## 2017-10-30 NOTE — PROGRESS NOTES
"Subjective:   Patient ID: Rivka Jurado is a 57 y.o. female.  Chief Complaint:  Numbness (right finger numbness)      Presents for evaluation of numbness and tingling in right hand.  Reports mainly in the first 3 digits.  Unclear, but does not think pinky involved.  Denies previous carpal tunnel syndrome issue.  Also while awaiting psychiatry, wants to restart medication for mood.  Interested in Effexor.    Other referrals and physical therapy have been arranged.  No additional complaints concerns today.          Review of Systems   Constitutional: Positive for fatigue. Negative for activity change and appetite change.   Respiratory: Negative for cough, chest tightness, shortness of breath and wheezing.    Cardiovascular: Negative for chest pain, palpitations and leg swelling.   Gastrointestinal: Negative for abdominal pain, constipation, diarrhea, nausea and vomiting.   Genitourinary: Negative for difficulty urinating and pelvic pain.   Musculoskeletal: Negative for back pain, myalgias and neck pain.   Skin: Negative for rash.   Neurological: Positive for weakness and numbness. Negative for dizziness, tremors, syncope, light-headedness and headaches.   Psychiatric/Behavioral: Positive for decreased concentration, dysphoric mood and sleep disturbance. Negative for agitation, behavioral problems, confusion, hallucinations, self-injury and suicidal ideas. The patient is nervous/anxious. The patient is not hyperactive.      Objective:   BP (!) 142/88 (BP Location: Right arm, Patient Position: Sitting, BP Method: Large (Manual))   Temp 97.5 °F (36.4 °C) (Tympanic)   Ht 5' 5.5" (1.664 m)   Wt 113.1 kg (249 lb 5.4 oz)   BMI 40.86 kg/m²     Physical Exam   Constitutional: Vital signs are normal. She appears well-developed and well-nourished. No distress.   Neck: No JVD present.   Cardiovascular: Normal rate, regular rhythm and normal heart sounds.  Exam reveals no gallop and no friction rub.    No murmur " heard.  Pulmonary/Chest: Effort normal and breath sounds normal. She has no wheezes. She has no rhonchi. She has no rales.   Abdominal: Soft. She exhibits no distension. There is no tenderness.   Musculoskeletal: She exhibits no edema.   Neurological:   Right wrist with closely positive Tinel's and Phalen signs.  No thenar atrophy.  No weakness.   Skin: Skin is warm and dry. No rash noted.   Psychiatric: She has a normal mood and affect.   Nursing note and vitals reviewed.    Assessment:     1. Numbness and tingling of right hand    2. Anxiety      Plan:   Numbness and tingling of right hand  -     methylPREDNISolone (MEDROL DOSEPACK) 4 mg tablet; Use as directed  Dispense: 1 Package; Refill: 0  Medrol Dosepak.  Wear wrist splints as tolerated.  Reassess at follow-up visit.  If no improvement will need probable injection or surgical procedure.  If fifth digit involved then will need additional imaging/evaluation for more neck related issue.      Anxiety  -     venlafaxine (EFFEXOR-XR) 75 MG 24 hr capsule; Take 1 capsule (75 mg total) by mouth once daily.  Dispense: 90 capsule; Refill: 0  Discussed options.  Agrees to trial of low-dose Effexor.    Hypertension course will control.  Reassess next visit.  Return to clinic in 2 weeks as scheduled.

## 2017-11-06 ENCOUNTER — TELEPHONE (OUTPATIENT)
Dept: INTERNAL MEDICINE | Facility: CLINIC | Age: 57
End: 2017-11-06

## 2017-11-06 NOTE — TELEPHONE ENCOUNTER
----- Message from Alphonso Tsang sent at 11/6/2017  8:23 AM CST -----  Contact: [facility]- [phone]  Would like to consult with nurse about Tolterodine for pt. Has questions about appeal pt submitted. Need to speak with nurse before appeal can be submitted. [phone]. thx lj

## 2017-11-06 NOTE — TELEPHONE ENCOUNTER
S/w [employer] and informed that pt has tried and failed other medications and Detrol LA is the only one that works. They will submit prior auth now.

## 2017-11-07 DIAGNOSIS — M21.371 RIGHT FOOT DROP: ICD-10-CM

## 2017-11-07 DIAGNOSIS — B37.2 CANDIDAL DERMATITIS: ICD-10-CM

## 2017-11-07 DIAGNOSIS — G57.91 NEUROPATHY OF RIGHT FOOT: ICD-10-CM

## 2017-11-07 RX ORDER — TIZANIDINE HYDROCHLORIDE 4 MG/1
2 CAPSULE, GELATIN COATED ORAL NIGHTLY
Qty: 180 CAPSULE | Refills: 0 | OUTPATIENT
Start: 2017-11-07 | End: 2018-02-05

## 2017-11-07 RX ORDER — NITROFURANTOIN 25; 75 MG/1; MG/1
CAPSULE ORAL
Qty: 90 CAPSULE | Refills: 0 | OUTPATIENT
Start: 2017-11-07

## 2017-11-07 RX ORDER — FLUCONAZOLE 200 MG/1
200 TABLET ORAL
Qty: 24 TABLET | Refills: 0 | OUTPATIENT
Start: 2017-11-09 | End: 2018-02-07

## 2017-11-07 NOTE — TELEPHONE ENCOUNTER
Refill denied. Too soon.  Tizanidine ordered 10/10/2017 for 3 months  Fluconazole ordered 10/12/2017 for 3 months

## 2017-11-09 ENCOUNTER — OFFICE VISIT (OUTPATIENT)
Dept: INTERNAL MEDICINE | Facility: CLINIC | Age: 57
End: 2017-11-09
Payer: MEDICARE

## 2017-11-09 VITALS
HEIGHT: 66 IN | WEIGHT: 249.81 LBS | TEMPERATURE: 98 F | BODY MASS INDEX: 40.15 KG/M2 | HEART RATE: 102 BPM | SYSTOLIC BLOOD PRESSURE: 122 MMHG | OXYGEN SATURATION: 99 % | DIASTOLIC BLOOD PRESSURE: 76 MMHG

## 2017-11-09 DIAGNOSIS — G56.01 CARPAL TUNNEL SYNDROME OF RIGHT WRIST: Primary | ICD-10-CM

## 2017-11-09 DIAGNOSIS — I10 HYPERTENSION, ESSENTIAL: ICD-10-CM

## 2017-11-09 DIAGNOSIS — F41.9 ANXIETY: ICD-10-CM

## 2017-11-09 PROCEDURE — 99999 PR PBB SHADOW E&M-EST. PATIENT-LVL III: CPT | Mod: PBBFAC,,, | Performed by: FAMILY MEDICINE

## 2017-11-09 PROCEDURE — 99214 OFFICE O/P EST MOD 30 MIN: CPT | Mod: S$GLB,,, | Performed by: FAMILY MEDICINE

## 2017-11-09 PROCEDURE — 99499 UNLISTED E&M SERVICE: CPT | Mod: S$GLB,,, | Performed by: FAMILY MEDICINE

## 2017-11-09 RX ORDER — PREGABALIN 75 MG/1
75 CAPSULE ORAL 2 TIMES DAILY
Qty: 180 CAPSULE | Refills: 3 | Status: SHIPPED | OUTPATIENT
Start: 2017-11-09 | End: 2018-05-14 | Stop reason: SDUPTHER

## 2017-11-09 NOTE — PROGRESS NOTES
Subjective:   Patient ID: Rivka Jurado is a 57 y.o. female.  Chief Complaint:  Follow-up (1 month)      Presents for one-month follow-up.  Last visit CBC, CMP, lipids, TSH, A1c, HIV, urinalysis testing all negative/acceptable.  Issues were stable.    Chronic muscular skeletal, ID,  issues were stable.   Has appointment scheduled with psychiatry.  Restarting Effexor has helped overall mood somewhat.  Denies side effects.  Medrol Dosepak helped numbness and hand somewhat, confirmed pinky not involved.  Most likely carpal tunnel syndrome related.  No weakness but symptoms persist.  Unable to use wrist splint due to Walker need for ambulation.    Routine health maintenance shows need for tetanus booster.  Had flu vaccine in September.  Reports Pap smear and mammogram up-to-date from GYN in [place].  Due for colonoscopy, but defers at this time.  No new or additional complaints today.          Current Outpatient Prescriptions:     benazepril (LOTENSIN) 20 MG tablet, Take 1 tablet (20 mg total) by mouth once daily., Disp: 90 tablet, Rfl: 0    clonazePAM (KLONOPIN) 1 MG tablet, Take 1 tablet (1 mg total) by mouth every evening., Disp: 90 tablet, Rfl: 0    empty container (NASAL SPRAY BOTTLE) Botl, 1 spray by Misc.(Non-Drug; Combo Route) route as needed., Disp: , Rfl:     fluconazole (DIFLUCAN) 200 MG Tab, Take 1 tablet (200 mg total) by mouth twice a week., Disp: 24 tablet, Rfl: 0    fluticasone (FLONASE) 50 mcg/actuation nasal spray, 2 sprays by Each Nare route once daily., Disp: 1 Bottle, Rfl: 12    hydrocortisone (ANUSOL-HC) 2.5 % rectal cream, Place rectally as needed for Hemorrhoids., Disp: , Rfl:     mupirocin (BACTROBAN) 2 % ointment, Apply topically 3 (three) times daily., Disp: , Rfl:     naproxen (NAPROSYN) 500 MG tablet, Take 1,000 tablets by mouth every evening. , Disp: , Rfl:     nitrofurantoin, macrocrystal-monohydrate, (MACROBID) 100 MG capsule, Take 1 capsule (100 mg total) by mouth every  evening., Disp: 90 capsule, Rfl: 0    nystatin (MYCOSTATIN) powder, Apply topically as needed., Disp: , Rfl:     nystatin-triamcinolone (MYCOLOG II) cream, Apply topically 2 (two) times daily., Disp: , Rfl:     pregabalin (LYRICA) 75 MG capsule, Take 1 capsule (75 mg total) by mouth 2 (two) times daily., Disp: 180 capsule, Rfl: 3    tizanidine 4 mg Cap, Take 2 capsules by mouth every evening., Disp: 180 capsule, Rfl: 0    tolterodine (DETROL LA) 4 MG 24 hr capsule, Take 1 capsule (4 mg total) by mouth every evening., Disp: 90 capsule, Rfl: 0    triamcinolone acetonide 0.1% (KENALOG) 0.1 % cream, Apply topically as needed., Disp: , Rfl:     valacyclovir (VALTREX) 1000 MG tablet, Take 1 tablet (1,000 mg total) by mouth once daily., Disp: 90 tablet, Rfl: 0    venlafaxine (EFFEXOR-XR) 75 MG 24 hr capsule, Take 1 capsule (75 mg total) by mouth once daily., Disp: 90 capsule, Rfl: 0     Review of Systems   Constitutional: Negative for chills, fatigue and fever.   HENT: Negative for congestion, dental problem, ear pain, postnasal drip, sinus pressure and sore throat.    Eyes: Negative for visual disturbance.   Respiratory: Negative for cough, chest tightness, shortness of breath and wheezing.    Cardiovascular: Negative for chest pain, palpitations and leg swelling.   Gastrointestinal: Negative for abdominal pain, blood in stool, constipation, diarrhea, nausea and vomiting.   Endocrine: Negative for polydipsia, polyphagia and polyuria.   Genitourinary: Negative for difficulty urinating, dysuria, flank pain, hematuria and pelvic pain.   Musculoskeletal: Positive for gait problem and myalgias.   Skin: Negative for rash.   Neurological: Positive for weakness and numbness. Negative for dizziness, syncope, light-headedness and headaches.   Hematological: Negative for adenopathy.   Psychiatric/Behavioral: Positive for sleep disturbance. The patient is nervous/anxious.      Objective:   /76 (BP Location: Right arm,  "Patient Position: Sitting, BP Method: Large (Manual))   Pulse 102   Temp 97.7 °F (36.5 °C) (Oral)   Ht 5' 6" (1.676 m)   Wt 113.3 kg (249 lb 12.5 oz)   SpO2 99%   BMI 40.32 kg/m²     Physical Exam   Constitutional: She is oriented to person, place, and time. Vital signs are normal. She appears well-developed and well-nourished. No distress.   Neck: No JVD present.   Cardiovascular: Normal rate, regular rhythm and normal heart sounds.  Exam reveals no gallop and no friction rub.    No murmur heard.  Pulmonary/Chest: Effort normal and breath sounds normal. She has no wheezes. She has no rhonchi. She has no rales.   Abdominal: Soft. She exhibits no distension. There is no tenderness.   Musculoskeletal: She exhibits no edema.   Neurological: She is alert and oriented to person, place, and time. She displays atrophy. She displays no tremor. A sensory deficit is present. She exhibits abnormal muscle tone. She displays a negative Romberg sign. She displays no seizure activity. Coordination and gait abnormal.   Right Foot Drop   Skin: Skin is warm and dry. No rash noted.   Psychiatric: She has a normal mood and affect. Her speech is normal and behavior is normal. Judgment and thought content normal. Her mood appears not anxious. Her affect is not angry, not blunt, not labile and not inappropriate. She is not agitated, not aggressive, not hyperactive, not slowed, not withdrawn, not actively hallucinating and not combative. Cognition and memory are normal. She does not exhibit a depressed mood. She is attentive.   Nursing note and vitals reviewed.    Assessment:     1. Carpal tunnel syndrome of right wrist    2. Hypertension, essential    3. Anxiety      Plan:   Carpal tunnel syndrome of right wrist  -     pregabalin (LYRICA) 75 MG capsule; Take 1 capsule (75 mg total) by mouth 2 (two) times daily.  Dispense: 180 capsule; Refill: 3  Failed oral steroids.  Cannot wear splint.  Declines referral for injection.  Try " increase frequency of Lyrica.  If no help consider increased dose.    Hypertension, essential  Controlled.  BP at goal.  Continue present medications.    Anxiety  Improved with low-dose Effexor.  Continue medications for now.  Follow-up psychiatry as planned.    RHM  Request old records from GYN provider.  Advised to get tetanus booster at  Pharmacy.    Next line return to clinic 3 months.

## 2017-11-15 DIAGNOSIS — I10 HYPERTENSION, ESSENTIAL: ICD-10-CM

## 2017-11-16 RX ORDER — BENAZEPRIL HYDROCHLORIDE 20 MG/1
20 TABLET ORAL DAILY
Qty: 90 TABLET | Refills: 3 | Status: SHIPPED | OUTPATIENT
Start: 2017-11-16 | End: 2018-12-31 | Stop reason: SDUPTHER

## 2017-11-29 ENCOUNTER — OFFICE VISIT (OUTPATIENT)
Dept: PSYCHIATRY | Facility: CLINIC | Age: 57
End: 2017-11-29
Payer: COMMERCIAL

## 2017-11-29 DIAGNOSIS — F33.1 MAJOR DEPRESSIVE DISORDER, RECURRENT EPISODE, MODERATE: Primary | ICD-10-CM

## 2017-11-29 PROCEDURE — 99499 UNLISTED E&M SERVICE: CPT | Mod: S$GLB,,, | Performed by: SOCIAL WORKER

## 2017-11-29 PROCEDURE — 90791 PSYCH DIAGNOSTIC EVALUATION: CPT | Mod: S$GLB,,, | Performed by: SOCIAL WORKER

## 2017-11-29 NOTE — LETTER
December 1, 2017        Kennedy Singh MD  [address] [place] [address]             [facility]  [address] [place] [address]  Phone: [phone]  Fax: [phone]   Patient: Rivka Jurado   MR Number: [id number]   YOB: 1960   Date of Visit: 11/29/2017       Dear Dr. Singh:    Thank you for referring Rivka Jurado to me for evaluation. Please see the initial evaluation for the relevant portions of my assessment and plan of care.    If you have questions, please do not hesitate to call me. I look forward to following Rivka along with you.    Sincerely,      Holland Irvin, REBECCA           CC  No Recipients
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
## 2017-11-30 ENCOUNTER — TELEPHONE (OUTPATIENT)
Dept: INTERNAL MEDICINE | Facility: CLINIC | Age: 57
End: 2017-11-30

## 2017-11-30 NOTE — TELEPHONE ENCOUNTER
Pt stated that pharmacy said they never got rx that was sent in on 10/10/17 and she only has 1 pill left. Told pt that I would call and talk to pharmacy and return her call. Spoke with Maikol and verified that rx was sent in but never filled. They will fill her medication today. Notified pt. Pt verbalized understanding.

## 2017-11-30 NOTE — TELEPHONE ENCOUNTER
----- Message from Alphonso Tsang sent at 11/30/2017 11:41 AM CST -----  Contact: [phone]  Would like to consult with nurse about script for klonopin; states out of med; need refill on script; asked to have nurse call bk at [phone]. thx lj     [facility] [place] [place] - [address]  [address] [facility]  [place] [address]  Phone: [phone] Fax: [phone]

## 2017-12-13 ENCOUNTER — OFFICE VISIT (OUTPATIENT)
Dept: PSYCHIATRY | Facility: CLINIC | Age: 57
End: 2017-12-13
Payer: COMMERCIAL

## 2017-12-13 ENCOUNTER — TELEPHONE (OUTPATIENT)
Dept: INTERNAL MEDICINE | Facility: CLINIC | Age: 57
End: 2017-12-13

## 2017-12-13 DIAGNOSIS — L71.9 ACNE ROSACEA: Primary | ICD-10-CM

## 2017-12-13 DIAGNOSIS — F33.1 MAJOR DEPRESSIVE DISORDER, RECURRENT EPISODE, MODERATE: Primary | ICD-10-CM

## 2017-12-13 PROCEDURE — 90837 PSYTX W PT 60 MINUTES: CPT | Mod: S$GLB,,, | Performed by: SOCIAL WORKER

## 2017-12-13 PROCEDURE — 99499 UNLISTED E&M SERVICE: CPT | Mod: S$GLB,,, | Performed by: SOCIAL WORKER

## 2017-12-13 RX ORDER — DOXYCYCLINE 100 MG/1
100 CAPSULE ORAL 2 TIMES DAILY
Qty: 20 CAPSULE | Refills: 0 | Status: SHIPPED | OUTPATIENT
Start: 2017-12-13 | End: 2017-12-23

## 2017-12-13 RX ORDER — METRONIDAZOLE 7.5 MG/G
GEL TOPICAL 2 TIMES DAILY
Qty: 45 G | Refills: 0 | Status: SHIPPED | OUTPATIENT
Start: 2017-12-13 | End: 2018-02-08

## 2017-12-14 NOTE — PROGRESS NOTES
Individual Psychotherapy (PhD/LCSW)    12/13/2017    Site:  [place]         Therapeutic Intervention: Met with patient.  Outpatient - Insight oriented psychotherapy 60 min - CPT code 75812    Chief complaint/reason for encounter: depression     Interval history and content of current session:  Patient presents to ongoing individual therapy due to depression.  She details the difficulty she has had in the relationship with her current boyfriend, Clinton.  The relationship started in March when she was living in [place].  He enjoys cooking.  He would come over to her home and cook.  He would bring a six pack of beer.  She would see that three were left.  She did not think he had a drinking problem.  She decided to move to [place] to be with him in June.  She went in his office where he has a small fridge.  She discovered three bottles of flavored vodka.  She would pick him up from his work and he would already be intoxicated.  She discovered that he has had several DUIs in the past.  He has refused to stop drinking.  He has started having problems with impotence which has caused decreased physical intimacy.  She is on a fixed income and she does not feel that she can afford to move back to [place].  She notes that all of her friends live in [place].  She was involved in a [employer] Worship there.  She will watch the services online.  She has not found a Worship in [place].  Her daughter told her after Thanksgiving that she had come to [place] to see her father and her brother.  The patient becomes tearful noting that her daughter did not see her.  She still has no contact with her son and his child because of his anger about her actions toward his father in the past.  The patient details how she feels she was a good mother to her son and daughter.  She admits that she feels trapped in [place].    Treatment plan:  · Target symptoms: depression  · Why chosen therapy is appropriate versus  another modality: relevant to diagnosis  · Outcome monitoring methods: self-report, observation  · Therapeutic intervention type: insight oriented psychotherapy, supportive psychotherapy, interactive psychotherapy    Risk parameters:  Patient reports no suicidal ideation  Patient reports no homicidal ideation  Patient reports no self-injurious behavior  Patient reports no violent behavior    Verbal deficits: None    Patient's response to intervention:  The patient's response to intervention is accepting, motivated.    Progress toward goals and other mental status changes:  The patient's progress toward goals is fair .    Diagnosis:   Major depression recurrent moderate    Plan:  individual psychotherapy and medication management by physician    Return to clinic: as scheduled    Length of Service (minutes): 60

## 2017-12-27 ENCOUNTER — OFFICE VISIT (OUTPATIENT)
Dept: PSYCHIATRY | Facility: CLINIC | Age: 57
End: 2017-12-27
Payer: MEDICARE

## 2017-12-27 DIAGNOSIS — F33.1 MAJOR DEPRESSIVE DISORDER, RECURRENT EPISODE, MODERATE: Primary | ICD-10-CM

## 2017-12-27 PROCEDURE — 99499 UNLISTED E&M SERVICE: CPT | Mod: S$GLB,,, | Performed by: SOCIAL WORKER

## 2017-12-27 PROCEDURE — 90834 PSYTX W PT 45 MINUTES: CPT | Mod: S$GLB,,, | Performed by: SOCIAL WORKER

## 2017-12-27 NOTE — PROGRESS NOTES
Individual Psychotherapy (PhD/LCSW)    12/27/2017    Site:  [place]         Therapeutic Intervention: Met with patient.  Outpatient - Insight oriented psychotherapy 45 min - CPT code 31582    Chief complaint/reason for encounter: depression     Interval history and content of current session:  Patient presents to ongoing individual therapy due to depression.  She is worried about gaining more weight.  She notes that she weighed 350 in the past.  She does not want to get back to that weight.  She admits that she will make poor food choices.  Her partner, Clinton, will buy candy for her when he is buying alcohol.  She has low self esteem due to negative comments she has been told in the past.  Her partner told her in the past that he did not like heavy women.  She admits that when she first saw her partner she was not very attracted to him.  She has had several past traumas which have triggered her.  She wonders when it will get better.  She is given the 12 steps to OA and encouraged to find a meeting.  Her daughter, son, and ex  are getting together for [place].  She notes that she has been alone for the past several Christmases.  She confronted her daughter telling her that it would be nice to share some time in future holidays.  The patient admits that she is angry with doctors who operated on her right leg in the past.  She details how she was a family friend of the doctor.  They had talked about amputating the leg in the past.  She notes that she participated in physical therapy to avoid an amputation.  She admits that she can be head strong at times.  She is frustrated that she is financially limited due to her disability.    Treatment plan:  Target symptoms: depression  Why chosen therapy is appropriate versus another modality: relevant to diagnosis  Outcome monitoring methods: self-report, observation  Therapeutic intervention type: insight oriented psychotherapy, supportive psychotherapy,  interactive psychotherapy     Risk parameters:  Patient reports no suicidal ideation  Patient reports no homicidal ideation  Patient reports no self-injurious behavior  Patient reports no violent behavior     Verbal deficits: None     Patient's response to intervention:  The patient's response to intervention is accepting, motivated.     Progress toward goals and other mental status changes:  The patient's progress toward goals is fair .     Diagnosis:   Major depression recurrent moderate     Plan:  individual psychotherapy and medication management by physician     Return to clinic: as scheduled     Length of Service (minutes): 45

## 2018-01-18 ENCOUNTER — OFFICE VISIT (OUTPATIENT)
Dept: PSYCHIATRY | Facility: CLINIC | Age: 58
End: 2018-01-18
Payer: MEDICARE

## 2018-01-18 DIAGNOSIS — F41.9 ANXIETY: ICD-10-CM

## 2018-01-18 PROCEDURE — 99499 UNLISTED E&M SERVICE: CPT | Mod: S$GLB,,, | Performed by: PSYCHIATRY & NEUROLOGY

## 2018-01-18 PROCEDURE — 90792 PSYCH DIAG EVAL W/MED SRVCS: CPT | Mod: S$GLB,,, | Performed by: PSYCHIATRY & NEUROLOGY

## 2018-01-18 RX ORDER — CLONAZEPAM 1 MG/1
1 TABLET ORAL NIGHTLY
Qty: 90 TABLET | Refills: 0 | Status: SHIPPED | OUTPATIENT
Start: 2018-01-18 | End: 2018-03-16 | Stop reason: SDUPTHER

## 2018-01-18 RX ORDER — VILAZODONE HYDROCHLORIDE 20 MG/1
20 TABLET ORAL DAILY
Qty: 30 TABLET | Refills: 2 | Status: SHIPPED | OUTPATIENT
Start: 2018-01-18 | End: 2018-01-30 | Stop reason: SINTOL
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
## 2018-01-22 ENCOUNTER — OFFICE VISIT (OUTPATIENT)
Dept: PSYCHIATRY | Facility: CLINIC | Age: 58
End: 2018-01-22
Payer: COMMERCIAL

## 2018-01-22 DIAGNOSIS — M21.371 RIGHT FOOT DROP: ICD-10-CM

## 2018-01-22 DIAGNOSIS — G57.91 NEUROPATHY OF RIGHT FOOT: ICD-10-CM

## 2018-01-22 DIAGNOSIS — F33.1 MAJOR DEPRESSIVE DISORDER, RECURRENT EPISODE, MODERATE: Primary | ICD-10-CM

## 2018-01-22 PROCEDURE — 90834 PSYTX W PT 45 MINUTES: CPT | Mod: S$GLB,,, | Performed by: SOCIAL WORKER

## 2018-01-22 RX ORDER — TIZANIDINE HYDROCHLORIDE 4 MG/1
2 CAPSULE, GELATIN COATED ORAL NIGHTLY
Qty: 180 CAPSULE | Refills: 0 | OUTPATIENT
Start: 2018-01-22 | End: 2018-04-22

## 2018-01-22 RX ORDER — TIZANIDINE HYDROCHLORIDE 4 MG/1
2 CAPSULE, GELATIN COATED ORAL NIGHTLY
Qty: 180 CAPSULE | Refills: 0 | Status: SHIPPED | OUTPATIENT
Start: 2018-01-22 | End: 2018-02-22 | Stop reason: CLARIF

## 2018-01-22 NOTE — TELEPHONE ENCOUNTER
----- Message from Rupali Viveros sent at 1/22/2018 11:19 AM CST -----  Contact: Patient  1. What is the name of the medication you are requesting? Zanflex  2. What is the dose? 4mg  3. How do you take the medication? Orally, topically, etc? orally  4. How often do you take this medication? 2 nightly  5. Do you need a 30 day or 90 day supply?   6. How many refills are you requesting?   7. What is your preferred pharmacy and location of the pharmacy? [facility] on [address]  8. Who can we contact with further questions? Patient at [phone]

## 2018-01-24 NOTE — PROGRESS NOTES
Individual Psychotherapy (PhD/LCSW)    1/22/2018    Site:  [facility]         Therapeutic Intervention: Met with patient.  Outpatient - Insight oriented psychotherapy 45 min - CPT code 70557    Chief complaint/reason for encounter: depression     Interval history and content of current session:  Patient presents to ongoing individual therapy due to depression.  She met with Dr. Hernandez.  She feels that she was greatly helped by his advice.  He asked her if there is more as to why her son is not talking to her.  Her daughter was talking to her and she would get pictures of her granddaughter that way.  Since [place], her daughter is no longer talking to her.  She is not sure what has happened in the relationship with her daughter.  She notes that her daughter does not have any children.  The patient is not sure if her daughter is able to have children.  She is very connected to her son's daughter.  The patient tried to send her granddaughter a dress for [employer].  The package was sent back to her.  The patient's boyfriend has tried to call her son.  Her son answered the phone because he did not know the number.  Her boyfriend began to tell her son how what he was doing to the patient is wrong.  Her son told him that he did not want him to call his number again and he ended the phone call.  She continues to participate in physical therapy.  She is using a walker.  She was watching her Latter-day service from [employer] online.  She does feel that the Latter-day services are beneficial.    Treatment plan:  Target symptoms: depression  Why chosen therapy is appropriate versus another modality: relevant to diagnosis  Outcome monitoring methods: self-report, observation  Therapeutic intervention type: insight oriented psychotherapy, supportive psychotherapy, interactive psychotherapy     Risk parameters:  Patient reports no suicidal ideation  Patient reports no homicidal ideation  Patient reports no self-injurious  behavior  Patient reports no violent behavior     Verbal deficits: None     Patient's response to intervention:  The patient's response to intervention is accepting, motivated.     Progress toward goals and other mental status changes:  The patient's progress toward goals is fair .     Diagnosis:   Major depression recurrent moderate     Plan:  individual psychotherapy and medication management by physician     Return to clinic: as scheduled     Length of Service (minutes): 45

## 2018-01-25 ENCOUNTER — PATIENT OUTREACH (OUTPATIENT)
Dept: ADMINISTRATIVE | Facility: HOSPITAL | Age: 58
End: 2018-01-25

## 2018-01-25 NOTE — LETTER
January 25, 2018    Rivka Jurado  [address] [place] [address]             [facility]  [address]  [facility] [address]  Phone: [phone] Dear Ms. Jurado:    [facility] is committed to your overall health.  To help you get the most out of each of your visits, we will review your information to make sure you are up to date on all of your recommended tests and/or procedures.      Kennedy Singh MD has found that you may be due for   Health Maintenance Due   Topic    Hepatitis C Screening     TETANUS VACCINE     Pap Smear with HPV Cotest     Colonoscopy         If you have had any of the above done at another facility, please bring the records or information with you so that your record at [facility] will be complete.    If you are currently taking medication, please bring it with you to your appointment for review.    We will be happy to assist you with scheduling any necessary appointments or you may contact the [facility] appointment desk at [phone] to schedule at your convenience.     Thank you for choosing [facility] for your healthcare needs,    Cheryl SAL LPN Care Coordinator  [facility]  [phone]

## 2018-01-29 DIAGNOSIS — E66.01 MORBID OBESITY WITH BMI OF 40.0-44.9, ADULT: Primary | ICD-10-CM

## 2018-01-30 ENCOUNTER — TELEPHONE (OUTPATIENT)
Dept: PSYCHIATRY | Facility: CLINIC | Age: 58
End: 2018-01-30

## 2018-01-30 NOTE — TELEPHONE ENCOUNTER
----- Message from Felicitas Lara sent at 1/30/2018  9:21 AM CST -----  Contact: pt  States she was put on an anti depressant and she needs get off of it. States the viibryd 20 mg, one a day with food, its causing heart palpations, angry and crying. States she's not taking it today. States she has to take a shower this morning and she is handicap and she has an MRI appt at 1:00. Pt uses     [facility] [place] [facility] [address]  [address] [facility] [address] [place], [place] [address] [address]  Phone: [phone] Fax: [phone]    Please call pt at [phone]. Thank you

## 2018-02-02 ENCOUNTER — TELEPHONE (OUTPATIENT)
Dept: DERMATOLOGY | Facility: CLINIC | Age: 58
End: 2018-02-02

## 2018-02-02 NOTE — TELEPHONE ENCOUNTER
Called patients to advise that per francine she may bring her boyfriend to her scheduled therapy visit on Monday 2-5-17.

## 2018-02-05 ENCOUNTER — OFFICE VISIT (OUTPATIENT)
Dept: PSYCHIATRY | Facility: CLINIC | Age: 58
End: 2018-02-05
Payer: MEDICARE

## 2018-02-05 DIAGNOSIS — F33.1 MAJOR DEPRESSIVE DISORDER, RECURRENT EPISODE, MODERATE: Primary | ICD-10-CM

## 2018-02-05 PROCEDURE — 90834 PSYTX W PT 45 MINUTES: CPT | Mod: S$GLB,,, | Performed by: SOCIAL WORKER
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
## 2018-02-07 DIAGNOSIS — R32 URINARY INCONTINENCE, UNSPECIFIED TYPE: ICD-10-CM

## 2018-02-07 DIAGNOSIS — N39.0 RECURRENT UTI: ICD-10-CM

## 2018-02-08 ENCOUNTER — OFFICE VISIT (OUTPATIENT)
Dept: INTERNAL MEDICINE | Facility: CLINIC | Age: 58
End: 2018-02-08
Payer: MEDICARE

## 2018-02-08 VITALS
WEIGHT: 263.44 LBS | OXYGEN SATURATION: 98 % | DIASTOLIC BLOOD PRESSURE: 84 MMHG | TEMPERATURE: 97 F | BODY MASS INDEX: 42.34 KG/M2 | SYSTOLIC BLOOD PRESSURE: 124 MMHG | HEIGHT: 66 IN | HEART RATE: 83 BPM

## 2018-02-08 DIAGNOSIS — E66.01 MORBID OBESITY WITH BMI OF 40.0-44.9, ADULT: ICD-10-CM

## 2018-02-08 DIAGNOSIS — R32 URINARY INCONTINENCE, UNSPECIFIED TYPE: ICD-10-CM

## 2018-02-08 DIAGNOSIS — L71.9 ACNE ROSACEA: ICD-10-CM

## 2018-02-08 DIAGNOSIS — R29.898 WEAKNESS OF RIGHT LOWER EXTREMITY: ICD-10-CM

## 2018-02-08 DIAGNOSIS — M21.371 RIGHT FOOT DROP: ICD-10-CM

## 2018-02-08 DIAGNOSIS — G56.01 CARPAL TUNNEL SYNDROME OF RIGHT WRIST: ICD-10-CM

## 2018-02-08 DIAGNOSIS — I10 HYPERTENSION, ESSENTIAL: Primary | Chronic | ICD-10-CM

## 2018-02-08 DIAGNOSIS — G57.91 NEUROPATHY OF RIGHT FOOT: ICD-10-CM

## 2018-02-08 DIAGNOSIS — J32.9 CHRONIC RECURRENT SINUSITIS: ICD-10-CM

## 2018-02-08 DIAGNOSIS — F41.9 ANXIETY: Chronic | ICD-10-CM

## 2018-02-08 PROCEDURE — 3008F BODY MASS INDEX DOCD: CPT | Mod: S$GLB,,, | Performed by: FAMILY MEDICINE

## 2018-02-08 PROCEDURE — 99999 PR PBB SHADOW E&M-EST. PATIENT-LVL IV: CPT | Mod: PBBFAC,,, | Performed by: FAMILY MEDICINE

## 2018-02-08 PROCEDURE — 99499 UNLISTED E&M SERVICE: CPT | Mod: S$GLB,,, | Performed by: FAMILY MEDICINE

## 2018-02-08 PROCEDURE — 99214 OFFICE O/P EST MOD 30 MIN: CPT | Mod: PO | Performed by: FAMILY MEDICINE

## 2018-02-08 PROCEDURE — 99215 OFFICE O/P EST HI 40 MIN: CPT | Mod: S$GLB,,, | Performed by: FAMILY MEDICINE

## 2018-02-08 RX ORDER — METRONIDAZOLE 7.5 MG/G
GEL TOPICAL 2 TIMES DAILY
Qty: 45 G | Refills: 5 | Status: SHIPPED | OUTPATIENT
Start: 2018-02-08 | End: 2018-08-09

## 2018-02-08 RX ORDER — TOLTERODINE 4 MG/1
4 CAPSULE, EXTENDED RELEASE ORAL NIGHTLY
Qty: 90 CAPSULE | Refills: 3 | Status: SHIPPED | OUTPATIENT
Start: 2018-02-08 | End: 2019-01-14 | Stop reason: SDUPTHER

## 2018-02-08 NOTE — PROGRESS NOTES
Subjective:   Patient ID: Rivka Jurado is a 57 y.o. female.  Chief Complaint:  Follow-up      Presents for 3 month follow-up.  Hypertension.  Controlled.  Lotensin 20 mg daily.  Carpal tunnel syndrome resolved since last visit.  Asymptomatic.  Anxiety.  Stable.  Seeing psychiatry.  No improvement/worsening and chronic neurologic issues of right-sided weakness with neuropathy and foot drop.  Needs form lifelong disability and inability to work.  completed for total disability  Urinary incontinence controlled with present medication.  Reports recurrent sinus congestion/issues.  Last treated sinusitis in October.  Told if occurred needed limited CT scan of sinuses.  No additional complaints concerns today.    Declines colonoscopy.  Did not obtain tetanus booster through pharmacy.  Reports Pap smear up-to-date.  Needs hepatitis C screening.      Review of Systems   Constitutional: Positive for fatigue. Negative for activity change, appetite change, chills and fever.   HENT: Positive for congestion, postnasal drip, rhinorrhea, sinus pain, sinus pressure and sneezing. Negative for dental problem, ear discharge, ear pain, hearing loss, nosebleeds, sore throat, tinnitus, trouble swallowing and voice change.    Eyes: Positive for itching (occasional). Negative for discharge and visual disturbance.   Respiratory: Negative for cough, chest tightness, shortness of breath and wheezing.    Cardiovascular: Negative for chest pain, palpitations and leg swelling.   Gastrointestinal: Negative for abdominal pain, blood in stool, constipation, diarrhea, nausea and vomiting.   Endocrine: Negative for polydipsia, polyphagia and polyuria.   Genitourinary: Negative for difficulty urinating, dysuria, flank pain, hematuria and pelvic pain.   Musculoskeletal: Positive for arthralgias and gait problem (uses walker). Negative for back pain, myalgias and neck pain.   Skin: Negative for rash.   Allergic/Immunologic: Negative for food allergies.  "  Neurological: Positive for weakness (right foot drop), numbness and headaches. Negative for dizziness, tremors, syncope and light-headedness.   Hematological: Negative for adenopathy.   Psychiatric/Behavioral: Positive for sleep disturbance. Negative for agitation, behavioral problems, confusion, decreased concentration, dysphoric mood, hallucinations, self-injury and suicidal ideas. The patient is not nervous/anxious and is not hyperactive.       Objective:   /84 (BP Location: Right arm, Patient Position: Sitting, BP Method: Large (Manual))   Pulse 83   Temp 96.8 °F (36 °C) (Tympanic)   Ht 5' 6" (1.676 m)   Wt 119.5 kg (263 lb 7.2 oz)   SpO2 98%   BMI 42.52 kg/m²     Physical Exam   Constitutional: She is oriented to person, place, and time. Vital signs are normal. She appears well-developed and well-nourished. She is cooperative. No distress.   HENT:   Head: Normocephalic and atraumatic.   Right Ear: Tympanic membrane, external ear and ear canal normal. No middle ear effusion.   Left Ear: Tympanic membrane, external ear and ear canal normal.  No middle ear effusion.   Nose: Mucosal edema present. No rhinorrhea, nasal deformity or septal deviation. No epistaxis (no ectatic vessels or crusting seen on septum; dry nasal mucosa). Right sinus exhibits maxillary sinus tenderness and frontal sinus tenderness. Left sinus exhibits no maxillary sinus tenderness and no frontal sinus tenderness.   Mouth/Throat: Uvula is midline, oropharynx is clear and moist and mucous membranes are normal. Mucous membranes are not pale and not dry. No trismus in the jaw. Abnormal dentition (missing several upper molars). No uvula swelling. No oropharyngeal exudate or posterior oropharyngeal erythema. Tonsils are 0 on the right. Tonsils are 0 on the left.   Eyes: Conjunctivae, EOM and lids are normal. Pupils are equal, round, and reactive to light. Right eye exhibits no chemosis. Left eye exhibits no chemosis. Right conjunctiva " is not injected. Left conjunctiva is not injected. No scleral icterus. Right eye exhibits normal extraocular motion and no nystagmus. Left eye exhibits normal extraocular motion and no nystagmus.   eyeglasses   Neck: Trachea normal and phonation normal. No JVD present. No tracheal tenderness present. No tracheal deviation present. No thyroid mass and no thyromegaly present.   Cardiovascular: Normal rate, regular rhythm, normal heart sounds and intact distal pulses.  Exam reveals no gallop and no friction rub.    No murmur heard.  Pulmonary/Chest: Effort normal and breath sounds normal. No stridor. No respiratory distress. She has no wheezes. She has no rhonchi. She has no rales.   Abdominal: Soft. She exhibits no distension. There is no tenderness.   Musculoskeletal: She exhibits no edema.   Lymphadenopathy:        Head (right side): No submental, no submandibular, no preauricular and no posterior auricular adenopathy present.        Head (left side): No submental, no submandibular, no preauricular and no posterior auricular adenopathy present.     She has no cervical adenopathy.   Neurological: She is alert and oriented to person, place, and time. She displays atrophy. She displays no tremor. A sensory deficit is present. She exhibits abnormal muscle tone. She displays a negative Romberg sign. She displays no seizure activity. Coordination and gait abnormal.   Right Foot Drop   Skin: Skin is warm and dry. No rash noted. No erythema.   Psychiatric: She has a normal mood and affect. Her speech is normal and behavior is normal. Judgment and thought content normal. Her mood appears not anxious. Her affect is not angry, not blunt, not labile and not inappropriate. She is not agitated, not aggressive, not hyperactive, not slowed, not withdrawn, not actively hallucinating and not combative. Cognition and memory are normal. She does not exhibit a depressed mood. She is attentive.   Nursing note and vitals  reviewed.    Assessment:     1. Hypertension, essential    2. Weakness of right lower extremity    3. Neuropathy of right foot    4. Right foot drop    5. Carpal tunnel syndrome of right wrist    6. Urinary incontinence, unspecified type    7. Anxiety    8. Morbid obesity with BMI of 40.0-44.9, adult    9. Chronic recurrent sinusitis    10. Acne rosacea      Plan:   Hypertension, essential  Controlled.  BP at goal.  Continue Lotensin 20 mg daily.    Weaknes s of right lower extremity  Neuropathy of right foot  Right foot drop  Stable, but permanent disability.  Form completed.    Carpal tunnel syndrome of right wrist  Resolved.  Asymptomatic today.  No additional treatment/evaluation needed.    Urinary incontinence, unspecified type  Stable.  Symptoms controlled on present medication.    Anxiety  Appropriate today.  Continue per psychiatry.    Morbid obesity with BMI of 40.0-44.9, adult  Unfortunately unable to modify physical activity due to limiting neuro and musculoskeletal conditions    Chronic recurrent sinusitis  -     CT Sinuses without Contrast; Future; Expected date: 02/08/2018  Check limited CT scan of sinuses.  Additional treatment/recommendations after review of image.    Acne rosacea  -     metroNIDAZOLE (METROGEL) 0.75 % gel; Apply topically 2 (two) times daily.  Dispense: 45 g; Refill: 5  -     Ambulatory referral to Dermatology    [facility]  Declines  Hepatitis C antibody today.  Declines colonoscopy.  Declines tetanus booster  Reports Pap smear up-to-date.    Return to clinic 6 months or sooner as needed.

## 2018-02-08 NOTE — LETTER
February 8, 2018      [facility]  [address] [place] [address]  Phone: [phone]  Fax: [phone]       Patient: Rivka Jurado   YOB: 1960  Date of Visit: 02/08/2018    To Whom It May Concern:    Sonam Jurado  was at [facility] on 02/08/2018. She is permanently disabled and unable to work. If you have any questions or concerns, or if I can be of further assistance, please do not hesitate to contact me.    Sincerely,    Kennedy Singh MD

## 2018-02-12 ENCOUNTER — OFFICE VISIT (OUTPATIENT)
Dept: PSYCHIATRY | Facility: CLINIC | Age: 58
End: 2018-02-12
Payer: COMMERCIAL

## 2018-02-12 DIAGNOSIS — F33.1 MDD (MAJOR DEPRESSIVE DISORDER), RECURRENT EPISODE, MODERATE: Primary | ICD-10-CM

## 2018-02-12 PROCEDURE — 99213 OFFICE O/P EST LOW 20 MIN: CPT | Mod: S$GLB,,, | Performed by: PSYCHIATRY & NEUROLOGY

## 2018-02-12 PROCEDURE — 3008F BODY MASS INDEX DOCD: CPT | Mod: S$GLB,,, | Performed by: PSYCHIATRY & NEUROLOGY

## 2018-02-12 RX ORDER — NYSTATIN 100000 [USP'U]/G
POWDER TOPICAL
Status: CANCELLED | OUTPATIENT
Start: 2018-02-12

## 2018-02-12 RX ORDER — DOXEPIN HYDROCHLORIDE 10 MG/1
CAPSULE ORAL
Qty: 60 CAPSULE | Refills: 2 | Status: SHIPPED | OUTPATIENT
Start: 2018-02-12 | End: 2018-04-18
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
## 2018-02-12 NOTE — TELEPHONE ENCOUNTER
----- Message from Aubrie Jeffers sent at 2/12/2018 10:04 AM CST -----  Contact: Pt  Please give pt a call at ....[phone] (home) regarding her refill she requested that's not at the pharmacy.

## 2018-02-14 ENCOUNTER — HOSPITAL ENCOUNTER (OUTPATIENT)
Dept: RADIOLOGY | Facility: HOSPITAL | Age: 58
Discharge: HOME | End: 2018-02-14
Attending: FAMILY MEDICINE
Payer: MEDICARE

## 2018-02-14 DIAGNOSIS — J32.9 CHRONIC RECURRENT SINUSITIS: ICD-10-CM

## 2018-02-14 DIAGNOSIS — B37.2 CANDIDAL DERMATITIS: ICD-10-CM

## 2018-02-14 PROCEDURE — 70486 CT MAXILLOFACIAL W/O DYE: CPT | Mod: 26,,, | Performed by: RADIOLOGY

## 2018-02-14 PROCEDURE — 70486 CT MAXILLOFACIAL W/O DYE: CPT | Mod: TC,PO

## 2018-02-14 NOTE — TELEPHONE ENCOUNTER
----- Message from Munira Paco sent at 2/14/2018 12:07 PM CST -----  Contact: Patient   Patient called in to check on the status of her prescription, Please call her at [phone].      [facility] [place] - [place] - [address]  [address] [facility]  [place] [address]  Phone: [phone] Fax: [phone]    Thanks  td

## 2018-02-14 NOTE — TELEPHONE ENCOUNTER
----- Message from Munira Paco sent at 2/14/2018 12:07 PM CST -----  Contact: Patient   Patient called in to check on the status of her prescription, Please call her at [phone].      [facility] [place] [facility] - [place] - [address]  [address] [facility]  [place] [address]  Phone: [phone] Fax: [phone]    Thanks  td

## 2018-02-15 ENCOUNTER — TELEPHONE (OUTPATIENT)
Dept: INTERNAL MEDICINE | Facility: CLINIC | Age: 58
End: 2018-02-15

## 2018-02-15 RX ORDER — FLUCONAZOLE 150 MG/1
150 TABLET ORAL ONCE
Qty: 1 TABLET | Refills: 1 | Status: SHIPPED | OUTPATIENT
Start: 2018-02-15 | End: 2018-02-15

## 2018-02-15 RX ORDER — FLUCONAZOLE 200 MG/1
200 TABLET ORAL
Qty: 24 TABLET | Refills: 0 | Status: SHIPPED | OUTPATIENT
Start: 2018-02-15 | End: 2018-04-30 | Stop reason: SDUPTHER

## 2018-02-15 RX ORDER — NYSTATIN 100000 [USP'U]/G
POWDER TOPICAL
Qty: 60 G | Refills: 0 | Status: SHIPPED | OUTPATIENT
Start: 2018-02-15 | End: 2018-03-12 | Stop reason: SDUPTHER

## 2018-02-15 NOTE — TELEPHONE ENCOUNTER
----- Message from Sharda Eden sent at 2/15/2018  9:46 AM CST -----  Patient states that she was returning your call.   She wants to know if you have gotten the results of her CT scan?   Call her at [phone].                                        [place]

## 2018-02-15 NOTE — TELEPHONE ENCOUNTER
----- Message from Kennedy Singh MD sent at 2/15/2018  1:04 PM CST -----  Trace chronic mucosal thickening seen involving the very inferior left maxillary sinus otherwise no significant sinus disease.  Mildly deviated septum to the right.  Sent copy of report to ENT to see what they recommend

## 2018-02-15 NOTE — TELEPHONE ENCOUNTER
----- Message from Bell Esparza sent at 2/15/2018  3:27 PM CST -----  Contact: Patient  Patient called and stated she had a CT yesterday and was told she can't get any medication until after the CT. She is eager to get some medication. She can be contacted at [phone].    Thanks,  Bell

## 2018-02-19 ENCOUNTER — OFFICE VISIT (OUTPATIENT)
Dept: PSYCHIATRY | Facility: CLINIC | Age: 58
End: 2018-02-19
Payer: MEDICARE

## 2018-02-19 DIAGNOSIS — F33.1 MAJOR DEPRESSIVE DISORDER, RECURRENT EPISODE, MODERATE: Primary | ICD-10-CM

## 2018-02-19 PROCEDURE — 90834 PSYTX W PT 45 MINUTES: CPT | Mod: S$GLB,,, | Performed by: SOCIAL WORKER

## 2018-02-19 NOTE — PROGRESS NOTES
Individual Psychotherapy (PhD/LCSW)    2/19/2018    Site:  [place]         Therapeutic Intervention: Met with patient.  Outpatient - Insight oriented psychotherapy 45 min - CPT code 96875    Chief complaint/reason for encounter: depression     Interval history and content of current session:  Patient presents to ongoing individual therapy due to depression.  She does think she is feeling better emotionally.  She enjoyed spending some time with her boyfriend's sons over the weekend.  Her boyfriend boiled some shrimp and they enjoyed the company.  She details her his sons have helped her move heavy items in the past.  She admits that one of his sons did talk about his mother a good bit when they first met, but he does not talk about her as much currently.  She has tried to talk to her daughter on the messenger application on Facebook.  Her daughter did read the message, but her daughter then blocked the patient.  The patient is trying to get on Facebook less.  She has told the group which shares about abused or neglected animals that her doctor has recommended she be on social media less.  The patient notes how many sacrifices she made for her children.  She notes that they were living in difficult conditions because her ex  would not work.  She was working three nursing jobs at the time.  She notes that she raised her children to respect their elders.  She notes that honoring parents is a commandment in the bible.  She is not aware of a reason which would make sense of their recent actions.  She went with her boyfriend to the doctor this morning.  She was surprised when the doctor told her boyfriend that she had called concerned about his drinking.  The patient was surprised that he did not get angry.  She thinks he was touched that someone was that concerned about him.  The doctor told him not to drink due to the medication he is taking.    Treatment plan:  Target symptoms: depression  Why chosen therapy  is appropriate versus another modality: relevant to diagnosis  Outcome monitoring methods: self-report, observation  Therapeutic intervention type: insight oriented psychotherapy, supportive psychotherapy, interactive psychotherapy     Risk parameters:  Patient reports no suicidal ideation  Patient reports no homicidal ideation  Patient reports no self-injurious behavior  Patient reports no violent behavior     Verbal deficits: None     Patient's response to intervention:  The patient's response to intervention is accepting, motivated.     Progress toward goals and other mental status changes:  The patient's progress toward goals is fair .     Diagnosis:   Major depression recurrent moderate     Plan:  individual psychotherapy and medication management by physician     Return to clinic: as scheduled     Length of Service (minutes): 45

## 2018-02-22 DIAGNOSIS — G57.91 NEUROPATHY OF RIGHT FOOT: ICD-10-CM

## 2018-02-22 DIAGNOSIS — M21.371 RIGHT FOOT DROP: ICD-10-CM

## 2018-02-22 RX ORDER — MONTELUKAST SODIUM 10 MG/1
10 TABLET ORAL NIGHTLY
Qty: 90 TABLET | Refills: 0 | Status: SHIPPED | OUTPATIENT
Start: 2018-02-22 | End: 2018-03-12 | Stop reason: SDUPTHER

## 2018-02-22 RX ORDER — TIZANIDINE HYDROCHLORIDE 4 MG/1
2 CAPSULE, GELATIN COATED ORAL NIGHTLY
Qty: 180 CAPSULE | Refills: 0 | Status: CANCELLED | OUTPATIENT
Start: 2018-02-22 | End: 2018-05-23

## 2018-02-22 RX ORDER — CETIRIZINE HYDROCHLORIDE 10 MG/1
10 TABLET ORAL DAILY
Qty: 90 TABLET | Refills: 0 | Status: SHIPPED | OUTPATIENT
Start: 2018-02-22 | End: 2018-04-16 | Stop reason: SDUPTHER

## 2018-02-22 RX ORDER — TIZANIDINE 4 MG/1
8 TABLET ORAL NIGHTLY
Qty: 180 TABLET | Refills: 0 | Status: SHIPPED | OUTPATIENT
Start: 2018-02-22 | End: 2018-03-12 | Stop reason: SDUPTHER

## 2018-02-22 RX ORDER — FLUTICASONE PROPIONATE 50 MCG
2 SPRAY, SUSPENSION (ML) NASAL DAILY
Qty: 3 BOTTLE | Refills: 0 | Status: SHIPPED | OUTPATIENT
Start: 2018-02-22 | End: 2018-05-14 | Stop reason: SDUPTHER

## 2018-02-22 NOTE — TELEPHONE ENCOUNTER
Sent prescriptions to pharmacy for Singulair 10 mg daily, Zyrtec 10 mg daily, Flonase 2 puffs daily.    3 month supply of all medications.

## 2018-02-22 NOTE — TELEPHONE ENCOUNTER
Sent prescription for Zanaflex tablets.    ENT said nothing they need to do/can do based on CT results.  No infection.  No antibiotics needed.    Needs full ALLERGY treatment with over-the-counter antihistamine, over-the-counter nasal steroid  with additional prescription Singulair if needed or she could see an Allergist.

## 2018-02-22 NOTE — TELEPHONE ENCOUNTER
Patient called to request script for Zanaflex tablets insurance would not pay for capsules..  Patient also calling to find out the status of ENT about MRI of sinuses report.  Please advise.

## 2018-02-22 NOTE — TELEPHONE ENCOUNTER
----- Message from Jaspreet Ken sent at 2/22/2018  8:46 AM CST -----  Contact: Pt   [employer] will only pay for tablets Anorex 4 mg will not pay for  capsules     ..  [facility] - [place] - [address]  [address] [facility]  [place] [address]  Phone: [phone] Fax: [phone]      Pt need MRI results. Please call when report has been sent to MRI ENT doctor need to know how to proceed..[phone] (home)

## 2018-02-22 NOTE — TELEPHONE ENCOUNTER
Patient informed of information.  Patient would like nose spray and singulair called in to pharmacy. called in to pharmacy for

## 2018-03-06 ENCOUNTER — OFFICE VISIT (OUTPATIENT)
Dept: PSYCHIATRY | Facility: CLINIC | Age: 58
End: 2018-03-06
Payer: MEDICARE

## 2018-03-06 ENCOUNTER — TELEPHONE (OUTPATIENT)
Dept: INTERNAL MEDICINE | Facility: CLINIC | Age: 58
End: 2018-03-06

## 2018-03-06 ENCOUNTER — CLINICAL SUPPORT (OUTPATIENT)
Dept: URGENT CARE | Facility: CLINIC | Age: 58
End: 2018-03-06
Payer: MEDICARE

## 2018-03-06 VITALS — DIASTOLIC BLOOD PRESSURE: 82 MMHG | SYSTOLIC BLOOD PRESSURE: 160 MMHG

## 2018-03-06 DIAGNOSIS — Z01.30 BLOOD PRESSURE CHECK: Primary | ICD-10-CM

## 2018-03-06 DIAGNOSIS — F33.1 MAJOR DEPRESSIVE DISORDER, RECURRENT EPISODE, MODERATE: Primary | ICD-10-CM

## 2018-03-06 PROCEDURE — 99999 PR PBB SHADOW E&M-EST. PATIENT-LVL I: CPT | Mod: PBBFAC,,,

## 2018-03-06 PROCEDURE — 90834 PSYTX W PT 45 MINUTES: CPT | Mod: S$GLB,,, | Performed by: SOCIAL WORKER

## 2018-03-06 PROCEDURE — 99499 UNLISTED E&M SERVICE: CPT | Mod: S$GLB,,, | Performed by: SOCIAL WORKER

## 2018-03-06 NOTE — PROGRESS NOTES
Individual Psychotherapy (PhD/LCSW)    3/6/2018    Site:  [place]         Therapeutic Intervention: Met with patient.  Outpatient - Insight oriented psychotherapy 45 min - CPT code 86968    Chief complaint/reason for encounter: depression     Interval history and content of current session:  Patient presents to ongoing individual therapy due to depression.  She recently had a cortisone injection in her back.  She has had injections over the years due to back problems.  She has not had any negative side effects in the past.  She was not able to sleep last night.  She admits that she has not been able to cry recently.  She wonders if she has become numb due to the emotional pain she has experienced in the past.  She has contacted [facility] for free  to pursue grandparent's rights.  She will not be able to get an appointment till August.  She has not heard from her son or daughter.  She notes that her daughter has had drug problems in the past.  The patient thought she was close to her son.  She has been frustrated with her boyfriend recently.  She looked in the freezer of his small fridge and she found [employer] there.  She notes that he lied to her in the beginning of their relationship.  He also lied about how much money he made.  She says she does not care about the money.  She wonders if she should leave him.  She admits she does not have anywhere to go.  She admits that she has an eating addiction.  She is frustrated with the lack of intimacy.  She notes that they had a good sexual relationship at the beginning, but her boyfriend has had problems with impotence.  She is frustrated he has not gone to the doctor for this condition.    Treatment plan:  Target symptoms: depression  Why chosen therapy is appropriate versus another modality: relevant to diagnosis  Outcome monitoring methods: self-report, observation  Therapeutic intervention type: insight oriented psychotherapy,  supportive psychotherapy, interactive psychotherapy     Risk parameters:  Patient reports no suicidal ideation  Patient reports no homicidal ideation  Patient reports no self-injurious behavior  Patient reports no violent behavior     Verbal deficits: None     Patient's response to intervention:  The patient's response to intervention is accepting, motivated.     Progress toward goals and other mental status changes:  The patient's progress toward goals is fair .     Diagnosis:   Major depression recurrent moderate     Plan:  individual psychotherapy and medication management by physician     Return to clinic: as scheduled     Length of Service (minutes): 45

## 2018-03-06 NOTE — TELEPHONE ENCOUNTER
----- Message from Vandana Garcia sent at 3/6/2018 11:25 AM CST -----  Contact: Pt  Pt called and stated she needed to speak to the nurse. She stated that she needs to schedule a nurse visit today to get her BP checked. She can be reached at [phone].    Thanks,  TF

## 2018-03-06 NOTE — PROGRESS NOTES
Blood pressure 160/82  Pt was satisfied with blood pressure reading, she was unsure if her machine was reading correctly.  Advise pt to follow up if she has any changes. Pt verbalize understanding.

## 2018-03-06 NOTE — TELEPHONE ENCOUNTER
Spoke with patient. States she was seen by urgent care on [address] today. BP was 160/82. States she will continue to monitor BP and if symptoms worsen she will schedule appt to be seen.//divya

## 2018-03-12 DIAGNOSIS — F41.9 ANXIETY: ICD-10-CM

## 2018-03-12 DIAGNOSIS — A60.00 GENITAL HERPES SIMPLEX, UNSPECIFIED SITE: ICD-10-CM

## 2018-03-12 RX ORDER — VORTIOXETINE 10 MG/1
TABLET, FILM COATED ORAL
Qty: 30 TABLET | Refills: 0 | OUTPATIENT
Start: 2018-03-12

## 2018-03-12 RX ORDER — NAPROXEN 500 MG/1
500 TABLET ORAL 2 TIMES DAILY WITH MEALS
Qty: 180 TABLET | Refills: 0 | Status: SHIPPED | OUTPATIENT
Start: 2018-03-12 | End: 2018-05-14 | Stop reason: SDUPTHER

## 2018-03-13 RX ORDER — NYSTATIN 100000 [USP'U]/G
POWDER TOPICAL 2 TIMES DAILY
Qty: 60 G | Refills: 11 | Status: SHIPPED | OUTPATIENT
Start: 2018-03-13 | End: 2019-03-05 | Stop reason: SDUPTHER

## 2018-03-13 RX ORDER — VALACYCLOVIR HYDROCHLORIDE 1 G/1
1000 TABLET, FILM COATED ORAL DAILY
Qty: 90 TABLET | Refills: 3 | Status: SHIPPED | OUTPATIENT
Start: 2018-03-13 | End: 2019-02-19 | Stop reason: SDUPTHER

## 2018-03-13 RX ORDER — VENLAFAXINE HYDROCHLORIDE 75 MG/1
75 CAPSULE, EXTENDED RELEASE ORAL DAILY
Qty: 90 CAPSULE | Refills: 3 | OUTPATIENT
Start: 2018-03-13 | End: 2019-03-13

## 2018-03-13 RX ORDER — MONTELUKAST SODIUM 10 MG/1
10 TABLET ORAL NIGHTLY
Qty: 90 TABLET | Refills: 3 | Status: SHIPPED | OUTPATIENT
Start: 2018-03-13 | End: 2018-04-18

## 2018-03-13 RX ORDER — TIZANIDINE 4 MG/1
8 TABLET ORAL NIGHTLY
Qty: 180 TABLET | Refills: 3 | Status: SHIPPED | OUTPATIENT
Start: 2018-03-13 | End: 2019-02-19 | Stop reason: SDUPTHER
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
## 2018-03-16 ENCOUNTER — OFFICE VISIT (OUTPATIENT)
Dept: PSYCHIATRY | Facility: CLINIC | Age: 58
End: 2018-03-16
Payer: MEDICARE

## 2018-03-16 DIAGNOSIS — F41.9 ANXIETY: ICD-10-CM

## 2018-03-16 DIAGNOSIS — F33.1 MAJOR DEPRESSIVE DISORDER, RECURRENT EPISODE, MODERATE: Primary | ICD-10-CM

## 2018-03-16 PROCEDURE — 99213 OFFICE O/P EST LOW 20 MIN: CPT | Mod: S$GLB,,, | Performed by: PSYCHIATRY & NEUROLOGY

## 2018-03-16 PROCEDURE — 90834 PSYTX W PT 45 MINUTES: CPT | Mod: S$GLB,,, | Performed by: SOCIAL WORKER

## 2018-03-16 RX ORDER — CLONAZEPAM 1 MG/1
1 TABLET ORAL NIGHTLY
Qty: 90 TABLET | Refills: 0 | Status: SHIPPED | OUTPATIENT
Start: 2018-03-16 | End: 2018-05-15 | Stop reason: SDUPTHER

## 2018-03-20 NOTE — PROGRESS NOTES
"Individual Psychotherapy (PhD/LCSW)    3/16/2018    [facility]         Therapeutic Intervention: Met with patient.  Outpatient - Insight oriented psychotherapy 45 min - CPT code 55126    Chief complaint/reason for encounter: depression     Interval history and content of current session:  Patient presents to ongoing individual therapy due to depression.  She continues to ruminate about not seeing her granddaughter.  She has been stalking her daughter's page and friends of the family.  She found out through pieces of her daughter's page that she is getting .  She has been dating the renuka she is marrying for some time.  The patient feels that her daughter wants to be involved in her niece and nephew's life.  She believes her son was angered when he discovered that his sister had been sharing pictures of the grandchildren with the patient.  The patient expresses anger about the way her children have treated her.  She relates that as a mother she did "everything" for her children.  She has been frustrated with the way her boyfriend's sons are treating her boyfriend.  She feels they have not been attentive to his illness.  The patient keeps contact with one friend in [place].  The patient remains frustrated with her lack of mobility due to a drop foot.  She continues to be isolated during the day.    Treatment plan:  Target symptoms: depression  Why chosen therapy is appropriate versus another modality: relevant to diagnosis  Outcome monitoring methods: self-report, observation  Therapeutic intervention type: insight oriented psychotherapy, supportive psychotherapy, interactive psychotherapy     Risk parameters:  Patient reports no suicidal ideation  Patient reports no homicidal ideation  Patient reports no self-injurious behavior  Patient reports no violent behavior     Verbal deficits: None     Patient's response to intervention:  The patient's response to intervention is accepting, " motivated.     Progress toward goals and other mental status changes:  The patient's progress toward goals is fair .     Diagnosis:   Major depression recurrent moderate     Plan:  individual psychotherapy and medication management by physician     Return to clinic: as scheduled     Length of Service (minutes): 45

## 2018-04-17 RX ORDER — CETIRIZINE HYDROCHLORIDE 10 MG/1
10 TABLET ORAL DAILY
Qty: 90 TABLET | Refills: 3 | Status: SHIPPED | OUTPATIENT
Start: 2018-04-17 | End: 2018-04-18

## 2018-04-18 ENCOUNTER — OFFICE VISIT (OUTPATIENT)
Dept: INTERNAL MEDICINE | Facility: CLINIC | Age: 58
End: 2018-04-18
Payer: MEDICARE

## 2018-04-18 VITALS
SYSTOLIC BLOOD PRESSURE: 132 MMHG | WEIGHT: 266.75 LBS | DIASTOLIC BLOOD PRESSURE: 90 MMHG | HEIGHT: 66 IN | HEART RATE: 92 BPM | TEMPERATURE: 99 F | OXYGEN SATURATION: 97 % | BODY MASS INDEX: 42.87 KG/M2

## 2018-04-18 DIAGNOSIS — L08.9 PUSTULE: ICD-10-CM

## 2018-04-18 DIAGNOSIS — L03.211 CELLULITIS OF FACE: Primary | ICD-10-CM

## 2018-04-18 DIAGNOSIS — L98.9 SORE ON SCALP: ICD-10-CM

## 2018-04-18 PROCEDURE — 3080F DIAST BP >= 90 MM HG: CPT | Mod: CPTII,S$GLB,, | Performed by: NURSE PRACTITIONER

## 2018-04-18 PROCEDURE — 3075F SYST BP GE 130 - 139MM HG: CPT | Mod: CPTII,S$GLB,, | Performed by: NURSE PRACTITIONER

## 2018-04-18 PROCEDURE — 99214 OFFICE O/P EST MOD 30 MIN: CPT | Mod: S$GLB,,, | Performed by: NURSE PRACTITIONER

## 2018-04-18 PROCEDURE — 99999 PR PBB SHADOW E&M-EST. PATIENT-LVL IV: CPT | Mod: PBBFAC,,, | Performed by: NURSE PRACTITIONER

## 2018-04-18 RX ORDER — MUPIROCIN 20 MG/G
OINTMENT TOPICAL 3 TIMES DAILY
Qty: 30 G | Refills: 0 | Status: SHIPPED | OUTPATIENT
Start: 2018-04-18 | End: 2018-09-18 | Stop reason: SDUPTHER

## 2018-04-18 RX ORDER — SULFAMETHOXAZOLE AND TRIMETHOPRIM 800; 160 MG/1; MG/1
1 TABLET ORAL 2 TIMES DAILY
Qty: 20 TABLET | Refills: 0 | Status: SHIPPED | OUTPATIENT
Start: 2018-04-18 | End: 2018-04-28

## 2018-04-18 RX ORDER — SULFAMETHOXAZOLE AND TRIMETHOPRIM 800; 160 MG/1; MG/1
2 TABLET ORAL 2 TIMES DAILY
Qty: 20 TABLET | Refills: 0 | Status: SHIPPED | OUTPATIENT
Start: 2018-04-18 | End: 2018-04-18 | Stop reason: SDUPTHER

## 2018-04-18 NOTE — PROGRESS NOTES
Subjective:       Patient ID: Rivka Jurado is a 57 y.o. female.    Chief Complaint: No chief complaint on file.    Patient presents with swelling and pain to chin and sore in scalp.  Reports also nodule (lymph node) that was larger a few days ago but decreased in size.  Reports using tweezers to chin and started having some pain and redness.  Noticed some drainage on yesterday.        Review of Systems   Constitutional: Negative for chills and fatigue.   Respiratory: Negative for cough and wheezing.    Cardiovascular: Negative for chest pain and palpitations.   Musculoskeletal: Positive for back pain and gait problem.   Skin: Positive for color change.   Psychiatric/Behavioral: Negative for agitation and confusion.       Objective:      Physical Exam   Constitutional: She is oriented to person, place, and time. Vital signs are normal. She appears well-developed and well-nourished.   HENT:   Head: Normocephalic and atraumatic.   Neck: Normal range of motion.   Cardiovascular: Normal rate and regular rhythm.    Pulmonary/Chest: Effort normal and breath sounds normal.   Musculoskeletal: Normal range of motion.   Neurological: She is alert and oriented to person, place, and time.   Skin: Skin is warm. Rash noted. Rash is papular. There is erythema.   Erythematous area noted to chin with papules.  Papule noted to scalp.  Erythematous.  Skin intact.  No drainage.     Psychiatric: She has a normal mood and affect. Her behavior is normal.       Assessment:       1. Cellulitis of face    2. Sore on scalp    3. Pustule        Plan:          Cellulitis of face  -     Discontinue: sulfamethoxazole-trimethoprim 800-160mg (BACTRIM DS) 800-160 mg Tab; Take 2 tablets by mouth 2 (two) times daily.  Dispense: 20 tablet; Refill: 0  -     mupirocin (BACTROBAN) 2 % ointment; Apply topically 3 (three) times daily.  Dispense: 30 g; Refill: 0  -     sulfamethoxazole-trimethoprim 800-160mg (BACTRIM DS) 800-160 mg Tab; Take 1 tablet by mouth  2 (two) times daily.  Dispense: 20 tablet; Refill: 0    Sore on scalp  -     mupirocin (BACTROBAN) 2 % ointment; Apply topically 3 (three) times daily.  Dispense: 30 g; Refill: 0    Pustule  -     mupirocin (BACTROBAN) 2 % ointment; Apply topically 3 (three) times daily.  Dispense: 30 g; Refill: 0        Patient instructed to take medications as ordered.  Has upcoming derm appointment.  If symptoms worsen, instructed to follow up sooner.

## 2018-04-23 ENCOUNTER — INITIAL CONSULT (OUTPATIENT)
Dept: DERMATOLOGY | Facility: CLINIC | Age: 58
End: 2018-04-23
Payer: MEDICARE

## 2018-04-23 DIAGNOSIS — L71.9 ROSACEA: Primary | ICD-10-CM

## 2018-04-23 DIAGNOSIS — L02.01 CUTANEOUS ABSCESS OF FACE: ICD-10-CM

## 2018-04-23 PROCEDURE — 87070 CULTURE OTHR SPECIMN AEROBIC: CPT

## 2018-04-23 PROCEDURE — 10040 EXTRACTION: CPT | Mod: S$GLB,,, | Performed by: DERMATOLOGY

## 2018-04-23 PROCEDURE — 99203 OFFICE O/P NEW LOW 30 MIN: CPT | Mod: 25,S$GLB,, | Performed by: DERMATOLOGY

## 2018-04-23 PROCEDURE — 87186 SC STD MICRODIL/AGAR DIL: CPT

## 2018-04-23 PROCEDURE — 87077 CULTURE AEROBIC IDENTIFY: CPT

## 2018-04-23 PROCEDURE — 99999 PR PBB SHADOW E&M-EST. PATIENT-LVL III: CPT | Mod: PBBFAC,,, | Performed by: DERMATOLOGY

## 2018-04-23 RX ORDER — SODIUM SULFACETAMIDE 100 MG/ML
LIQUID TOPICAL
Qty: 340 ML | Refills: 6 | Status: SHIPPED | OUTPATIENT
Start: 2018-04-23 | End: 2019-01-29

## 2018-04-23 RX ORDER — METRONIDAZOLE 7.5 MG/G
GEL TOPICAL
Qty: 45 G | Refills: 3 | Status: SHIPPED | OUTPATIENT
Start: 2018-04-23

## 2018-04-23 NOTE — PROGRESS NOTES
Subjective:       Patient ID:  Rivka Jurado is a 57 y.o. female who presents for   Chief Complaint   Patient presents with    Rosacea     History of Present Illness: The patient presents with chief complaint of redness.  Location: bilateral cheeks  Duration: several weeks  Signs/Symptoms: erythema, worsening complexion    Prior treatments: none    She also c/o cyst x 1 week, + tenderness and growth.  + drainage x 3 days.  Currently bactrim ds bid x 5 days.           Review of Systems     Objective:    Physical Exam   Constitutional: She appears well-developed and well-nourished. No distress.   Neurological: She is alert and oriented to person, place, and time. She is not disoriented.   Psychiatric: She has a normal mood and affect.   Skin:   Areas Examined (abnormalities noted in diagram):   Head / Face Inspection Performed  Neck Inspection Performed  RUE Inspected  LUE Inspection Performed  Nails and Digits Inspection Performed              Diagram Legend     Erythematous scaling macule/papule c/w actinic keratosis       Vascular papule c/w angioma      Pigmented verrucoid papule/plaque c/w seborrheic keratosis      Yellow umbilicated papule c/w sebaceous hyperplasia      Irregularly shaped tan macule c/w lentigo     1-2 mm smooth white papules consistent with Milia      Movable subcutaneous cyst with punctum c/w epidermal inclusion cyst      Subcutaneous movable cyst c/w pilar cyst      Firm pink to brown papule c/w dermatofibroma      Pedunculated fleshy papule(s) c/w skin tag(s)      Evenly pigmented macule c/w junctional nevus     Mildly variegated pigmented, slightly irregular-bordered macule c/w mildly atypical nevus      Flesh colored to evenly pigmented papule c/w intradermal nevus       Pink pearly papule/plaque c/w basal cell carcinoma      Erythematous hyperkeratotic cursted plaque c/w SCC      Surgical scar with no sign of skin cancer recurrence      Open and closed comedones      Inflammatory  papules and pustules      Verrucoid papule consistent consistent with wart     Erythematous eczematous patches and plaques     Dystrophic onycholytic nail with subungual debris c/w onychomycosis     Umbilicated papule    Erythematous-base heme-crusted tan verrucoid plaque consistent with inflamed seborrheic keratosis     Erythematous Silvery Scaling Plaque c/w Psoriasis     See annotation      Assessment / Plan:        Rosacea  -     metroNIDAZOLE (METROGEL) 0.75 % gel; AAA of face bid  Dispense: 45 g; Refill: 3  -     sulfacetamide sodium 10 % Clsr; Use as face wash daily  Dispense: 340 mL; Refill: 6  -     Discussed dx, AVS given. Pt given cetaphil cleanser for redness    Cutaneous abscess of face  -     Aerobic culture  -     After risks and benefits explained, verbal consent obtained, lesion incised with #11 blade and drained on today's date. Bacterial culture performed.  Defect packed with antibacterial gauze. Instructions for removal provided to patient to remove at 48 hours. Patient instructed to perform warm compresses 2 - 3 times/daily. Continue bactrim and mupirocin ointment BID with dressing changes for 14 days.  Recommend f/u with Dr. Terry Jimenez in ENT in 2-3 days, especially if worsens. The patient acknowledged understanding.     **30 minutes spent in counseling and coordination of care**           Follow-up in about 3 months (around 7/23/2018).

## 2018-04-23 NOTE — LETTER
April 23, 2018      Kennedy Singh MD  [address] [facility] [place] [address]           [facility]  [address] [place] [address]  Phone: [phone]  Fax: [phone]          Patient: Rivka Jurado   MR Number: [id number]   YOB: 1960   Date of Visit: 4/23/2018       Dear Dr. Kennedy Singh:    Thank you for referring Rivka Jurado to me for evaluation. Attached you will find relevant portions of my assessment and plan of care.    If you have questions, please do not hesitate to call me. I look forward to following Rivka Jurado along with you.    Sincerely,    Cyn Jimenez MD    Enclosure  CC:  No Recipients    If you would like to receive this communication electronically, please contact [email or web address] or [phone] to request more information on [employer] Link access.    For providers and/or their staff who would like to refer a patient to [facility], please contact us through our one-stop-shop provider referral line, [facility], at [phone].    If you feel you have received this communication in error or would no longer like to receive these types of communications, please e-mail [email or web address]

## 2018-04-25 ENCOUNTER — TELEPHONE (OUTPATIENT)
Dept: DERMATOLOGY | Facility: CLINIC | Age: 58
End: 2018-04-25

## 2018-04-25 NOTE — TELEPHONE ENCOUNTER
----- Message from Suze Dillon sent at 4/25/2018  4:36 PM CDT -----  Contact: pt   Pt is requesting a  Call back from nurse in regards to her test results.                                  [phone] (home)

## 2018-04-25 NOTE — TELEPHONE ENCOUNTER
----- Message from Fernando Richardson sent at 4/25/2018  3:28 PM CDT -----  Contact: Pt  Pt. Is calling regarding question about sulfacetamide. ..[phone] (home)

## 2018-04-26 ENCOUNTER — OFFICE VISIT (OUTPATIENT)
Dept: PSYCHIATRY | Facility: CLINIC | Age: 58
End: 2018-04-26
Payer: MEDICARE

## 2018-04-26 DIAGNOSIS — F33.1 MAJOR DEPRESSIVE DISORDER, RECURRENT EPISODE, MODERATE: Primary | ICD-10-CM

## 2018-04-26 LAB — BACTERIA SPEC AEROBE CULT: NORMAL

## 2018-04-26 PROCEDURE — 90834 PSYTX W PT 45 MINUTES: CPT | Mod: S$GLB,,, | Performed by: SOCIAL WORKER

## 2018-04-26 NOTE — PROGRESS NOTES
Individual Psychotherapy (PhD/LCSW)    4/26/2018    Site:  [place]         Therapeutic Intervention: Met with patient.  Outpatient - Insight oriented psychotherapy 45 min - CPT code 76053    Chief complaint/reason for encounter: depression     Interval history and content of current session:  Patient presents to ongoing individual therapy due to depression.  She makes self deprecating statements throughout the session.  She had posted something about her granddaughter online.  She was angered when she then found out her Facebook page had been shut down.  She messaged [employer] and told them it was her granddaughter and she had the right to post about her.  Her Facebook page was reinstated.  The patient feels that her daughter in law was blocking her page.  She texted her son that she did not want her daughter in law meddling in her life.  Her son responded telling the patient that he did not want her to contact him again.  On her son's birthday, the patient contacted him and told him she would bake a cake for him.  He did not respond.  She believes the lack of contact with her son is more difficult for her because he lives close and has her granddaughter.  The patient had to have a cardiac procedure due to the stress she was under.  She feels that the relationship with her boyfriend has been going well.  He has not been drinking alcohol.  They will be going to visit family in [place] on Saturday.  They have been busy caring for their four dogs.    Treatment plan:  Target symptoms: depression  Why chosen therapy is appropriate versus another modality: relevant to diagnosis  Outcome monitoring methods: self-report, observation  Therapeutic intervention type: insight oriented psychotherapy, supportive psychotherapy, interactive psychotherapy     Risk parameters:  Patient reports no suicidal ideation  Patient reports no homicidal ideation  Patient reports no self-injurious behavior  Patient reports no violent  behavior     Verbal deficits: None     Patient's response to intervention:  The patient's response to intervention is accepting, motivated.     Progress toward goals and other mental status changes:  The patient's progress toward goals is fair .     Diagnosis:   Major depression recurrent moderate     Plan:  individual psychotherapy and medication management by physician     Return to clinic: as scheduled     Length of Service (minutes): 45

## 2018-04-30 DIAGNOSIS — B37.2 CANDIDAL DERMATITIS: ICD-10-CM

## 2018-04-30 RX ORDER — FLUCONAZOLE 200 MG/1
200 TABLET ORAL
Qty: 24 TABLET | Refills: 0 | Status: SHIPPED | OUTPATIENT
Start: 2018-04-30 | End: 2018-05-18 | Stop reason: SDUPTHER

## 2018-04-30 NOTE — TELEPHONE ENCOUNTER
----- Message from Jaquelin FABIAN Jeffries sent at 4/30/2018  2:29 PM CDT -----  Contact: Pt   States she's calling to get a refill and can be reached at [phone]//[email or web address]     1. What is the name of the medication you are requesting? Diflucan   2. What is the dose?   3. How do you take the medication? Orally, topically, etc? orally  4. How often do you take this medication? Twice weekly   5. Do you need a 30 day or 90 day supply? Up to the Dr  6. How many refills are you requesting?   7. What is your preferred pharmacy and location of the pharmacy?   8. Who can we contact with further questions? Pt     [facility] [place] [place] [place] - [address]  [address]  [place] [address]  Phone: [phone] Fax: [phone]

## 2018-05-02 ENCOUNTER — CLINICAL SUPPORT (OUTPATIENT)
Dept: DERMATOLOGY | Facility: CLINIC | Age: 58
End: 2018-05-02
Payer: MEDICARE

## 2018-05-02 DIAGNOSIS — L02.01 CUTANEOUS ABSCESS OF FACE: Primary | ICD-10-CM

## 2018-05-02 PROCEDURE — 99499 UNLISTED E&M SERVICE: CPT | Mod: S$GLB,,, | Performed by: DERMATOLOGY

## 2018-05-02 PROCEDURE — 99999 PR PBB SHADOW E&M-EST. PATIENT-LVL I: CPT | Mod: PBBFAC,,,

## 2018-05-07 NOTE — PROGRESS NOTES
Here today for nurse's visit s/p I&D of abscess of submental chin.  Area has healed well.  No drainage or tenderness noted at the site.  Pt was eval by ENT who decided area did not need further I&D.  Continue oral antibiotics and mupirocin until completed course. F/u prn.

## 2018-05-14 DIAGNOSIS — G56.01 CARPAL TUNNEL SYNDROME OF RIGHT WRIST: ICD-10-CM

## 2018-05-14 RX ORDER — FLUTICASONE PROPIONATE 50 MCG
2 SPRAY, SUSPENSION (ML) NASAL DAILY
Qty: 48 G | Refills: 0 | Status: SHIPPED | OUTPATIENT
Start: 2018-05-14 | End: 2018-05-18 | Stop reason: SDUPTHER

## 2018-05-14 RX ORDER — VORTIOXETINE 20 MG/1
TABLET, FILM COATED ORAL
Qty: 30 TABLET | Refills: 0 | Status: SHIPPED | OUTPATIENT
Start: 2018-05-14 | End: 2018-05-15 | Stop reason: ALTCHOICE

## 2018-05-14 RX ORDER — DOXEPIN HYDROCHLORIDE 10 MG/1
CAPSULE ORAL
Qty: 60 CAPSULE | Refills: 0 | OUTPATIENT
Start: 2018-05-14

## 2018-05-14 RX ORDER — NAPROXEN 500 MG/1
500 TABLET ORAL 2 TIMES DAILY WITH MEALS
Qty: 180 TABLET | Refills: 0 | Status: SHIPPED | OUTPATIENT
Start: 2018-05-14 | End: 2018-07-18 | Stop reason: SDUPTHER

## 2018-05-14 RX ORDER — PREGABALIN 75 MG/1
75 CAPSULE ORAL 2 TIMES DAILY
Qty: 180 CAPSULE | Refills: 0 | Status: SHIPPED | OUTPATIENT
Start: 2018-05-14 | End: 2018-08-06 | Stop reason: SDUPTHER

## 2018-05-15 ENCOUNTER — OFFICE VISIT (OUTPATIENT)
Dept: PSYCHIATRY | Facility: CLINIC | Age: 58
End: 2018-05-15
Payer: MEDICARE

## 2018-05-15 DIAGNOSIS — F41.9 ANXIETY: ICD-10-CM

## 2018-05-15 PROCEDURE — 99499 UNLISTED E&M SERVICE: CPT | Mod: S$GLB,,, | Performed by: PSYCHIATRY & NEUROLOGY

## 2018-05-15 PROCEDURE — 99213 OFFICE O/P EST LOW 20 MIN: CPT | Mod: S$GLB,,, | Performed by: PSYCHIATRY & NEUROLOGY

## 2018-05-15 RX ORDER — ESCITALOPRAM OXALATE 10 MG/1
10 TABLET ORAL DAILY
Qty: 30 TABLET | Refills: 2 | Status: SHIPPED | OUTPATIENT
Start: 2018-05-15 | End: 2018-08-06 | Stop reason: SDUPTHER

## 2018-05-15 RX ORDER — CLONAZEPAM 1 MG/1
1 TABLET ORAL NIGHTLY
Qty: 90 TABLET | Refills: 0 | Status: SHIPPED | OUTPATIENT
Start: 2018-05-15 | End: 2018-08-14 | Stop reason: SDUPTHER
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
## 2018-05-17 DIAGNOSIS — G56.01 CARPAL TUNNEL SYNDROME OF RIGHT WRIST: ICD-10-CM

## 2018-05-17 DIAGNOSIS — B37.2 CANDIDAL DERMATITIS: ICD-10-CM

## 2018-05-18 RX ORDER — PREGABALIN 75 MG/1
CAPSULE ORAL
Qty: 180 CAPSULE | Refills: 0 | OUTPATIENT
Start: 2018-05-18

## 2018-05-18 RX ORDER — VORTIOXETINE 20 MG/1
TABLET, FILM COATED ORAL
Qty: 30 TABLET | Refills: 0 | OUTPATIENT
Start: 2018-05-18

## 2018-05-18 RX ORDER — FLUCONAZOLE 200 MG/1
TABLET ORAL
Qty: 24 TABLET | Refills: 0 | Status: SHIPPED | OUTPATIENT
Start: 2018-05-18 | End: 2018-08-06 | Stop reason: SDUPTHER

## 2018-05-18 RX ORDER — FLUTICASONE PROPIONATE 50 MCG
SPRAY, SUSPENSION (ML) NASAL
Qty: 48 G | Refills: 0 | Status: SHIPPED | OUTPATIENT
Start: 2018-05-18 | End: 2018-10-26 | Stop reason: SDUPTHER

## 2018-05-24 ENCOUNTER — OFFICE VISIT (OUTPATIENT)
Dept: PSYCHIATRY | Facility: CLINIC | Age: 58
End: 2018-05-24
Payer: COMMERCIAL

## 2018-05-24 DIAGNOSIS — F33.1 MAJOR DEPRESSIVE DISORDER, RECURRENT EPISODE, MODERATE: Primary | ICD-10-CM

## 2018-05-24 PROCEDURE — 90834 PSYTX W PT 45 MINUTES: CPT | Mod: S$GLB,,, | Performed by: SOCIAL WORKER

## 2018-05-24 NOTE — PROGRESS NOTES
"Individual Psychotherapy (PhD/LCSW)    2018    Site:  [place]         Therapeutic Intervention: Met with patient.  Outpatient - Insight oriented psychotherapy 45 min - CPT code 57024    Chief complaint/reason for encounter: depression     Interval history and content of current session:  Patient presents to ongoing individual therapy due to depression.  She had decided to get off of Trintellix due to stomach problems.  While she was off of the medication, she did not have a "filter."  She had decided to leave her boyfriend, Clinton, due to his uncleanliness.  She notes that she does not "live like that."  She details how he does not clean in the bathroom or kitchen.  She discovered that rent is expensive in many areas.  Her boyfriend's cousin .  He had two labs.  One of the dogs was not adjusting to a foster home.  They took on the dog.  Unfortunately, this dog does not get along with one of the patient's existing dogs.  The dog is constantly following the patient around.  She is not sure if they will be able to find an appropriate home for this dog.  She was angered with her boyfriend that he did not take her to a restaurant for Mother's Day.  She had told him that it would be a difficult day for her.  He did not have a response.  She has been talking to a man from a country in [place].  She had met him online.  He is younger than she.  She notes that the relationship has been platonic.  She was upset when he recently told her that he had a wife and a two year old child.  She notes that she thought he was one person she could trust.    Treatment plan:  Target symptoms: depression  Why chosen therapy is appropriate versus another modality: relevant to diagnosis  Outcome monitoring methods: self-report, observation  Therapeutic intervention type: insight oriented psychotherapy, supportive psychotherapy, interactive psychotherapy     Risk parameters:  Patient reports no suicidal ideation  Patient reports no " homicidal ideation  Patient reports no self-injurious behavior  Patient reports no violent behavior     Verbal deficits: None     Patient's response to intervention:  The patient's response to intervention is accepting, motivated.     Progress toward goals and other mental status changes:  The patient's progress toward goals is fair .     Diagnosis:   Major depression recurrent moderate     Plan:  individual psychotherapy and medication management by physician     Return to clinic: as scheduled     Length of Service (minutes): 45

## 2018-05-29 DIAGNOSIS — F41.9 ANXIETY: ICD-10-CM

## 2018-05-30 DIAGNOSIS — G56.01 CARPAL TUNNEL SYNDROME OF RIGHT WRIST: ICD-10-CM

## 2018-05-31 RX ORDER — PREGABALIN 75 MG/1
CAPSULE ORAL
Qty: 180 CAPSULE | Refills: 0 | OUTPATIENT
Start: 2018-05-31

## 2018-05-31 RX ORDER — MONTELUKAST SODIUM 10 MG/1
10 TABLET ORAL DAILY
COMMUNITY
Start: 2018-05-15 | End: 2018-08-09 | Stop reason: SDUPTHER

## 2018-05-31 RX ORDER — FLUTICASONE PROPIONATE 50 MCG
SPRAY, SUSPENSION (ML) NASAL
Qty: 48 G | Refills: 0 | OUTPATIENT
Start: 2018-05-31

## 2018-05-31 NOTE — TELEPHONE ENCOUNTER
Refill denied.   Done 5/14/2018  Please verify with pharmacy received previous prescribtionPlease call the patient and schedule an Appointment for any refills

## 2018-06-07 ENCOUNTER — OFFICE VISIT (OUTPATIENT)
Dept: PSYCHIATRY | Facility: CLINIC | Age: 58
End: 2018-06-07
Payer: MEDICARE

## 2018-06-07 DIAGNOSIS — F33.1 MAJOR DEPRESSIVE DISORDER, RECURRENT EPISODE, MODERATE: Primary | ICD-10-CM

## 2018-06-07 PROCEDURE — 90834 PSYTX W PT 45 MINUTES: CPT | Mod: S$GLB,,, | Performed by: SOCIAL WORKER
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
## 2018-07-19 RX ORDER — NAPROXEN 500 MG/1
500 TABLET ORAL 2 TIMES DAILY WITH MEALS
Qty: 180 TABLET | Refills: 0 | Status: SHIPPED | OUTPATIENT
Start: 2018-07-19 | End: 2018-10-10 | Stop reason: SDUPTHER

## 2018-07-26 ENCOUNTER — PATIENT OUTREACH (OUTPATIENT)
Dept: ADMINISTRATIVE | Facility: HOSPITAL | Age: 58
End: 2018-07-26

## 2018-07-26 NOTE — LETTER
July 26, 2018        Rivka Jurado  [address] [place] [address]      Dear Ms. Jurado,    You have an upcoming appointment with Kennedy Singh MD on 08/09/18.      Your chart is indicating you may be due for the following and I will be happy to assist you in scheduling any needed appointments:  Health Maintenance Due   Topic    Hepatitis C Screening     TETANUS VACCINE     Pap Smear with HPV Cotest     Colonoscopy           If you have had any of the above done at another facility, please bring the records or information with you so that your record at [facility] will be complete.    We will be happy to assist you with scheduling any necessary appointments or you may contact the [facility] appointment desk at [phone] to schedule at your convenience.     Thank you for choosing [facility] for your healthcare needs,    Cheryl SAL LPN Care Coordinator  [facility]  [phone]

## 2018-08-06 DIAGNOSIS — B37.2 CANDIDAL DERMATITIS: ICD-10-CM

## 2018-08-06 DIAGNOSIS — G56.01 CARPAL TUNNEL SYNDROME OF RIGHT WRIST: ICD-10-CM

## 2018-08-06 RX ORDER — PREGABALIN 75 MG/1
CAPSULE ORAL
Qty: 180 CAPSULE | Refills: 0 | Status: SHIPPED | OUTPATIENT
Start: 2018-08-06 | End: 2018-11-06 | Stop reason: SDUPTHER

## 2018-08-06 RX ORDER — FLUCONAZOLE 200 MG/1
TABLET ORAL
Qty: 24 TABLET | Refills: 0 | Status: SHIPPED | OUTPATIENT
Start: 2018-08-06 | End: 2018-10-26 | Stop reason: SDUPTHER

## 2018-08-06 RX ORDER — NITROFURANTOIN 25; 75 MG/1; MG/1
CAPSULE ORAL
Qty: 90 CAPSULE | Refills: 0 | Status: SHIPPED | OUTPATIENT
Start: 2018-08-06 | End: 2019-01-29 | Stop reason: SDUPTHER

## 2018-08-06 RX ORDER — ESCITALOPRAM OXALATE 10 MG/1
TABLET ORAL
Qty: 30 TABLET | Refills: 0 | Status: SHIPPED | OUTPATIENT
Start: 2018-08-06 | End: 2018-08-09

## 2018-08-09 ENCOUNTER — LAB VISIT (OUTPATIENT)
Dept: LAB | Facility: HOSPITAL | Age: 58
End: 2018-08-09
Attending: FAMILY MEDICINE
Payer: MEDICARE

## 2018-08-09 ENCOUNTER — OFFICE VISIT (OUTPATIENT)
Dept: INTERNAL MEDICINE | Facility: CLINIC | Age: 58
End: 2018-08-09
Payer: MEDICARE

## 2018-08-09 VITALS
TEMPERATURE: 97 F | WEIGHT: 278 LBS | DIASTOLIC BLOOD PRESSURE: 84 MMHG | SYSTOLIC BLOOD PRESSURE: 138 MMHG | HEART RATE: 108 BPM | BODY MASS INDEX: 44.68 KG/M2 | HEIGHT: 66 IN

## 2018-08-09 DIAGNOSIS — Z11.59 NEED FOR HEPATITIS C SCREENING TEST: ICD-10-CM

## 2018-08-09 DIAGNOSIS — M25.50 POLYARTHRALGIA: ICD-10-CM

## 2018-08-09 DIAGNOSIS — M21.371 RIGHT FOOT DROP: ICD-10-CM

## 2018-08-09 DIAGNOSIS — Z12.11 SCREENING FOR COLON CANCER: ICD-10-CM

## 2018-08-09 DIAGNOSIS — R29.898 WEAKNESS OF RIGHT LOWER EXTREMITY: ICD-10-CM

## 2018-08-09 DIAGNOSIS — Z91.81 RISK FOR FALLS: ICD-10-CM

## 2018-08-09 DIAGNOSIS — Z23 NEED FOR TDAP VACCINATION: ICD-10-CM

## 2018-08-09 DIAGNOSIS — G57.91 NEUROPATHY OF RIGHT FOOT: ICD-10-CM

## 2018-08-09 DIAGNOSIS — E66.01 MORBID OBESITY WITH BMI OF 40.0-44.9, ADULT: ICD-10-CM

## 2018-08-09 DIAGNOSIS — J30.0 VASOMOTOR RHINITIS: Primary | ICD-10-CM

## 2018-08-09 LAB
CRP SERPL-MCNC: 22.6 MG/L
ERYTHROCYTE [SEDIMENTATION RATE] IN BLOOD BY WESTERGREN METHOD: 40 MM/HR

## 2018-08-09 PROCEDURE — 99214 OFFICE O/P EST MOD 30 MIN: CPT | Mod: S$GLB,,, | Performed by: FAMILY MEDICINE

## 2018-08-09 PROCEDURE — 86140 C-REACTIVE PROTEIN: CPT

## 2018-08-09 PROCEDURE — 85651 RBC SED RATE NONAUTOMATED: CPT | Mod: PO

## 2018-08-09 PROCEDURE — 86803 HEPATITIS C AB TEST: CPT

## 2018-08-09 PROCEDURE — 3008F BODY MASS INDEX DOCD: CPT | Mod: CPTII,S$GLB,, | Performed by: FAMILY MEDICINE

## 2018-08-09 PROCEDURE — 99999 PR PBB SHADOW E&M-EST. PATIENT-LVL III: CPT | Mod: PBBFAC,,, | Performed by: FAMILY MEDICINE

## 2018-08-09 PROCEDURE — 3075F SYST BP GE 130 - 139MM HG: CPT | Mod: CPTII,S$GLB,, | Performed by: FAMILY MEDICINE

## 2018-08-09 PROCEDURE — 3079F DIAST BP 80-89 MM HG: CPT | Mod: CPTII,S$GLB,, | Performed by: FAMILY MEDICINE

## 2018-08-09 PROCEDURE — 36415 COLL VENOUS BLD VENIPUNCTURE: CPT | Mod: PO

## 2018-08-09 RX ORDER — MONTELUKAST SODIUM 10 MG/1
10 TABLET ORAL DAILY
Qty: 90 TABLET | Refills: 3 | Status: SHIPPED | OUTPATIENT
Start: 2018-08-09 | End: 2019-08-16 | Stop reason: SDUPTHER

## 2018-08-09 NOTE — PROGRESS NOTES
Subjective:   Patient ID: Rivka Jurado is a 57 y.o. female.  Chief Complaint:  Follow-up      Six-month follow-up chronic medical conditions.    Complains of vasomotor rhinitis.  Saw ENT.  Told nothing else to offer.  CT sinus without infection.  Previously prescribed Singulair, but did not /take.    Request physical therapy order to [facility] outpatient PT for muscle tone/ cardiac rehab.    Routine health maintenance needs colon cancer screening.  Request fit testing.  Needs tetanus booster.  Needs hepatitis C testing.  Reports previous hysterectomy, noncancerous.  Pap not indicated.    Complains of increased polyarthralgia.  Reports previous autoimmune negative arthritis.  Request sed rate and CRP levels to assess markers of inflammation.    Interested in potential bariatric surgery.  Questions with doctors recommended.    Other conditions appear stable.    Med list reviewed and updated.        Review of Systems   Constitutional: Positive for unexpected weight change. Negative for activity change, appetite change, chills, fatigue and fever.   HENT: Positive for congestion, postnasal drip, rhinorrhea, sinus pressure and sneezing. Negative for dental problem, ear discharge, ear pain, hearing loss, nosebleeds, sinus pain, sore throat, tinnitus, trouble swallowing and voice change.    Eyes: Negative for discharge, itching and visual disturbance.   Respiratory: Negative for cough, chest tightness, shortness of breath and wheezing.    Cardiovascular: Negative for chest pain, palpitations and leg swelling.   Gastrointestinal: Negative for abdominal pain, blood in stool, constipation, diarrhea, nausea and vomiting.   Endocrine: Negative for polydipsia, polyphagia and polyuria.   Genitourinary: Negative for difficulty urinating, dysuria, flank pain, hematuria and pelvic pain.   Musculoskeletal: Positive for arthralgias and gait problem (uses walker). Negative for back pain, myalgias and neck pain.   Skin:  "Negative for rash.   Allergic/Immunologic: Negative for food allergies.   Neurological: Positive for weakness (right foot drop), numbness and headaches. Negative for dizziness, tremors, syncope and light-headedness.   Hematological: Negative for adenopathy.   Psychiatric/Behavioral: Positive for sleep disturbance. Negative for agitation, behavioral problems, confusion, decreased concentration, dysphoric mood, hallucinations, self-injury and suicidal ideas. The patient is not nervous/anxious and is not hyperactive.      Objective:   /84   Pulse 108   Temp 97.2 °F (36.2 °C) (Tympanic)   Ht 5' 6" (1.676 m)   Wt 126.1 kg (278 lb)   BMI 44.87 kg/m²     Physical Exam   Constitutional: Vital signs are normal. She appears well-developed and well-nourished.    BP controlled.  Morbid obesity.  Weight increase since last visit.   Neck: No JVD present.   Cardiovascular: Normal rate and regular rhythm.    Pulmonary/Chest: Effort normal. No respiratory distress.   Musculoskeletal: She exhibits no edema.   Neurological: She displays atrophy. She exhibits abnormal muscle tone. Coordination and gait abnormal.     Right foot drop   Skin: Skin is warm and dry. No rash noted.   Psychiatric: She has a normal mood and affect. Her behavior is normal. Judgment and thought content normal.   Nursing note and vitals reviewed.    Assessment:     1. Vasomotor rhinitis    2. Neuropathy of right foot    3. Right foot drop    4. Weakness of right lower extremity    5. Risk for falls    6. Morbid obesity with BMI of 40.0-44.9, adult    7. Need for hepatitis C screening test    8. Polyarthralgia    9. Screening for colon cancer    10. Need for Tdap vaccination      Plan:   Vasomotor rhinitis  -     montelukast (SINGULAIR) 10 mg tablet; Take 1 tablet (10 mg total) by mouth once daily.  Dispense: 90 tablet; Refill: 3   Trial of Singulair for symptoms.    Continue medication if effective.      Neuropathy of right foot  Right foot " drop  Weakness of right lower extremity  Risk for falls  -     Ambulatory Referral to Physical/Occupational Therapy  Refer for physical therapy as requested.      Morbid obesity with BMI of 40.0-44.9, adult  Discussed increased BMI, Increased health risks, Need for weight loss, Lifestyle modifications.  Discussed based on BMI candidate for surgery for weight loss if interested.  Provided information to [facility] [place] [facility] [place].    Polyarthralgia  -     Sedimentation rate; Future; Expected date: 08/09/2018  -     C-reactive protein; Future; Expected date: 08/09/2018    Treat as indicated.      RHM  -     Hepatitis C antibody; Future; Expected date: 08/09/2018  -     Fecal Immunochemical Test (iFOBT); Future; Expected date: 08/09/2018  -     diphth,pertus,acell,,tetanus (BOOSTRIX) 2.5-8-5 Lf-mcg-Lf/0.5mL Susp; Inject 0.5 mLs into the muscle once. for 1 dose  Dispense: 0.5 mL; Refill: 0  Based on reported hysterectomy, Pap smear not indicated.    Continue all present medications  Return to clinic 6 months or sooner as needed.

## 2018-08-10 LAB — HCV AB SERPL QL IA: NEGATIVE

## 2018-08-13 ENCOUNTER — TELEPHONE (OUTPATIENT)
Dept: INTERNAL MEDICINE | Facility: CLINIC | Age: 58
End: 2018-08-13

## 2018-08-13 NOTE — TELEPHONE ENCOUNTER
Patient advised [facility] PT do not accept physical therapy for patients unless it's cardiac related.  Patient voiced understanding and state to disregard request.

## 2018-08-13 NOTE — TELEPHONE ENCOUNTER
----- Message from Irina Cardenas sent at 8/13/2018  9:34 AM CDT -----  Contact: [facility]  Calling for verification as to what therapy patient needs. Please call [facility] @ [phone]. Thanks, cristy
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
## 2018-08-14 ENCOUNTER — OFFICE VISIT (OUTPATIENT)
Dept: PSYCHIATRY | Facility: CLINIC | Age: 58
End: 2018-08-14
Payer: COMMERCIAL

## 2018-08-14 DIAGNOSIS — F41.9 ANXIETY: ICD-10-CM

## 2018-08-14 PROCEDURE — 99214 OFFICE O/P EST MOD 30 MIN: CPT | Mod: S$GLB,,, | Performed by: PSYCHIATRY & NEUROLOGY

## 2018-08-14 PROCEDURE — 99999 PR PBB SHADOW E&M-EST. PATIENT-LVL I: CPT | Mod: PBBFAC,,, | Performed by: PSYCHIATRY & NEUROLOGY

## 2018-08-14 PROCEDURE — 99211 OFF/OP EST MAY X REQ PHY/QHP: CPT | Mod: PBBFAC,PO | Performed by: PSYCHIATRY & NEUROLOGY

## 2018-08-14 RX ORDER — CLONAZEPAM 1 MG/1
1 TABLET ORAL NIGHTLY
Qty: 90 TABLET | Refills: 0 | Status: SHIPPED | OUTPATIENT
Start: 2018-08-14 | End: 2018-10-10 | Stop reason: SDUPTHER

## 2018-08-14 RX ORDER — DULOXETIN HYDROCHLORIDE 30 MG/1
30 CAPSULE, DELAYED RELEASE ORAL DAILY
Qty: 60 CAPSULE | Refills: 2 | Status: SHIPPED | OUTPATIENT
Start: 2018-08-14 | End: 2018-10-10 | Stop reason: SDUPTHER

## 2018-08-15 ENCOUNTER — TELEPHONE (OUTPATIENT)
Dept: INTERNAL MEDICINE | Facility: CLINIC | Age: 58
End: 2018-08-15

## 2018-08-15 NOTE — TELEPHONE ENCOUNTER
----- Message from Fernando Richardson sent at 8/15/2018 10:16 AM CDT -----  Contact: Pt.   Pt is calling regarding requesting Dr. Singh to please send antibiotics over to pharmacy. Pt states to have a sinus infection with is causing pt to lose voice, blood cloths, nasal congestion, and drainage in throat. Pt would like nurse to call with lab results. .[phone] (home)   Pt would like script to go to Pharm[facility] on [address].

## 2018-08-15 NOTE — TELEPHONE ENCOUNTER
Patient called to request antibiotic for respiratory infection.  Patient states that she is taking Singulair with no relief.

## 2018-08-15 NOTE — TELEPHONE ENCOUNTER
The Singulair takes 1 month to work.    Her rhinitis was chronic, not acute.    Her ENT has told her her symptoms are not infectious related.    She already takes daily doxycycline for another medical problem which should limit her bacterial infections.    No additional antibiotic indicated for her at this time.

## 2018-09-04 ENCOUNTER — PATIENT OUTREACH (OUTPATIENT)
Dept: ADMINISTRATIVE | Facility: HOSPITAL | Age: 58
End: 2018-09-04

## 2018-09-14 RX ORDER — ESCITALOPRAM OXALATE 10 MG/1
TABLET ORAL
Qty: 30 TABLET | Refills: 0 | OUTPATIENT
Start: 2018-09-14

## 2018-09-18 ENCOUNTER — TELEPHONE (OUTPATIENT)
Dept: INTERNAL MEDICINE | Facility: CLINIC | Age: 58
End: 2018-09-18

## 2018-09-18 DIAGNOSIS — L03.211 CELLULITIS OF FACE: ICD-10-CM

## 2018-09-18 DIAGNOSIS — L08.9 PUSTULE: ICD-10-CM

## 2018-09-18 DIAGNOSIS — L98.9 SORE ON SCALP: ICD-10-CM

## 2018-09-18 RX ORDER — MUPIROCIN 20 MG/G
OINTMENT TOPICAL 2 TIMES DAILY
Qty: 30 G | Refills: 0 | Status: SHIPPED | OUTPATIENT
Start: 2018-09-18 | End: 2019-09-03 | Stop reason: SDUPTHER

## 2018-10-10 ENCOUNTER — OFFICE VISIT (OUTPATIENT)
Dept: PSYCHIATRY | Facility: CLINIC | Age: 58
End: 2018-10-10
Payer: COMMERCIAL

## 2018-10-10 DIAGNOSIS — F41.9 ANXIETY: ICD-10-CM

## 2018-10-10 PROCEDURE — 99213 OFFICE O/P EST LOW 20 MIN: CPT | Mod: S$GLB,,, | Performed by: PSYCHIATRY & NEUROLOGY

## 2018-10-10 RX ORDER — CLONAZEPAM 1 MG/1
1 TABLET ORAL NIGHTLY
Qty: 90 TABLET | Refills: 2 | Status: SHIPPED | OUTPATIENT
Start: 2018-10-10 | End: 2019-01-08

## 2018-10-10 RX ORDER — DULOXETIN HYDROCHLORIDE 30 MG/1
60 CAPSULE, DELAYED RELEASE ORAL DAILY
Qty: 60 CAPSULE | Refills: 2 | Status: SHIPPED | OUTPATIENT
Start: 2018-10-10 | End: 2019-01-22 | Stop reason: SDUPTHER
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
## 2018-10-11 RX ORDER — NAPROXEN 500 MG/1
500 TABLET ORAL 2 TIMES DAILY WITH MEALS
Qty: 180 TABLET | Refills: 0 | Status: SHIPPED | OUTPATIENT
Start: 2018-10-11 | End: 2018-12-28

## 2018-10-11 RX ORDER — ESCITALOPRAM OXALATE 10 MG/1
10 TABLET ORAL DAILY
COMMUNITY
Start: 2018-08-09 | End: 2019-02-11

## 2018-10-18 ENCOUNTER — TELEPHONE (OUTPATIENT)
Dept: INTERNAL MEDICINE | Facility: CLINIC | Age: 58
End: 2018-10-18

## 2018-10-18 NOTE — TELEPHONE ENCOUNTER
----- Message from Bonnie Thomson sent at 10/17/2018  4:21 PM CDT -----  Patient needs call back,will elaborate..[phone] (home)

## 2018-10-18 NOTE — TELEPHONE ENCOUNTER
Patient calling for referral to comprehensive pain management to have spine injection.  Patient was seen by Dr. Lara, not comfortable with seeing him again.  [facility] and was given a list but states that ratings were not good.  Patient wants to know of any recommendations.

## 2018-10-19 DIAGNOSIS — M21.371 RIGHT FOOT DROP: ICD-10-CM

## 2018-10-19 DIAGNOSIS — M25.50 POLYARTHRALGIA: Primary | ICD-10-CM

## 2018-10-19 DIAGNOSIS — G57.91 NEUROPATHY OF RIGHT FOOT: ICD-10-CM

## 2018-10-19 DIAGNOSIS — R29.898 WEAKNESS OF RIGHT LOWER EXTREMITY: ICD-10-CM

## 2018-10-24 ENCOUNTER — TELEPHONE (OUTPATIENT)
Dept: INTERNAL MEDICINE | Facility: CLINIC | Age: 58
End: 2018-10-24

## 2018-10-24 RX ORDER — CLONAZEPAM 1 MG/1
TABLET ORAL
Qty: 90 TABLET | Refills: 0 | OUTPATIENT
Start: 2018-10-24

## 2018-10-24 RX ORDER — VORTIOXETINE 20 MG/1
TABLET, FILM COATED ORAL
Qty: 30 TABLET | Refills: 0 | OUTPATIENT
Start: 2018-10-24

## 2018-10-24 NOTE — TELEPHONE ENCOUNTER
Patient was given name, address, and phone number to [facility].  Patient informed referral is faxed.

## 2018-10-24 NOTE — TELEPHONE ENCOUNTER
----- Message from Munira Antonio sent at 10/24/2018  2:59 PM CDT -----  Contact: Patient   Patient would like a call back at [phone].    Thanks  Td

## 2018-10-24 NOTE — TELEPHONE ENCOUNTER
----- Message from Mishel Chan sent at 10/24/2018  2:39 PM CDT -----  Contact: pt  Pt stated she call about a referral to an outside provider, she can be reached at [phone] Thanks

## 2018-10-25 DIAGNOSIS — B37.2 CANDIDAL DERMATITIS: ICD-10-CM

## 2018-10-26 RX ORDER — ESCITALOPRAM OXALATE 10 MG/1
TABLET ORAL
Qty: 30 TABLET | Refills: 0 | OUTPATIENT
Start: 2018-10-26

## 2018-10-26 RX ORDER — FLUTICASONE PROPIONATE 50 MCG
SPRAY, SUSPENSION (ML) NASAL
Qty: 48 G | Refills: 0 | Status: SHIPPED | OUTPATIENT
Start: 2018-10-26 | End: 2019-02-19 | Stop reason: SDUPTHER

## 2018-10-26 RX ORDER — FLUCONAZOLE 200 MG/1
TABLET ORAL
Qty: 24 TABLET | Refills: 0 | Status: SHIPPED | OUTPATIENT
Start: 2018-10-26 | End: 2019-02-19 | Stop reason: SDUPTHER

## 2018-10-29 ENCOUNTER — TELEPHONE (OUTPATIENT)
Dept: INTERNAL MEDICINE | Facility: CLINIC | Age: 58
End: 2018-10-29

## 2018-10-29 NOTE — TELEPHONE ENCOUNTER
Referral to [facility] was not scheduled due to patient's insurance not accepted.  Please advise.

## 2018-10-30 DIAGNOSIS — G57.91 NEUROPATHY OF RIGHT FOOT: ICD-10-CM

## 2018-10-30 DIAGNOSIS — M54.5 CHRONIC LOW BACK PAIN, UNSPECIFIED BACK PAIN LATERALITY, WITH SCIATICA PRESENCE UNSPECIFIED: Primary | ICD-10-CM

## 2018-10-30 DIAGNOSIS — M21.371 RIGHT FOOT DROP: ICD-10-CM

## 2018-10-30 DIAGNOSIS — G89.29 CHRONIC LOW BACK PAIN, UNSPECIFIED BACK PAIN LATERALITY, WITH SCIATICA PRESENCE UNSPECIFIED: Primary | ICD-10-CM

## 2018-10-30 DIAGNOSIS — R29.898 WEAKNESS OF RIGHT LOWER EXTREMITY: ICD-10-CM

## 2018-11-01 ENCOUNTER — TELEPHONE (OUTPATIENT)
Dept: PAIN MEDICINE | Facility: CLINIC | Age: 58
End: 2018-11-01

## 2018-11-01 NOTE — TELEPHONE ENCOUNTER
Contacted patient; appointment scheduled with Dr. Salmon on 11/7/18. Pt verbalized understanding.

## 2018-11-06 DIAGNOSIS — G56.01 CARPAL TUNNEL SYNDROME OF RIGHT WRIST: ICD-10-CM

## 2018-11-06 RX ORDER — PREGABALIN 75 MG/1
75 CAPSULE ORAL 2 TIMES DAILY
Qty: 180 CAPSULE | Refills: 0 | Status: SHIPPED | OUTPATIENT
Start: 2018-11-06 | End: 2019-01-29 | Stop reason: SDUPTHER

## 2018-11-06 NOTE — TELEPHONE ENCOUNTER
Patient calling to request a script for steroids for back pain.  Patient has an upcoming appointment with Dr. Salmon but she can't tolerate the pain anymore.  Patient states that she was unable to take the naproxen due to having diarrhea, vomiting and nausea.  Patient says whatever you could do to help to get her some relief the pain is unbearable.

## 2018-11-06 NOTE — TELEPHONE ENCOUNTER
I refilled her Lyrica.    It looks like she has an appointment scheduled with Dr. Salmon for tomorrow.  If Lyrica not too sedating, she could take 2  capsules twice a day.    I would prefer to not give her steroids and interfere with any treatment plan Dr. Salmon would recommend

## 2018-11-06 NOTE — TELEPHONE ENCOUNTER
----- Message from Tray Woo sent at 11/6/2018 10:43 AM CST -----  Contact: pt  She's calling in regards to a new Rx for a medrol dose pack, pt states she can not take the Naproxin due to nausea, pls send this to [facility] pharmacy on [address], ph# [phone], pls call pt when Rx has been sent to the pharmacy, [phone] (home)

## 2018-11-06 NOTE — TELEPHONE ENCOUNTER
----- Message from Tray Woo sent at 11/6/2018 10:43 AM CST -----  Contact: pt  She's calling in regards to a new Rx for a medrol dose pack, pt states she can not take the Naproxin due to nausea, pls send this to [facility] pharmacy on [address] [place] [place], ph# [phone], pls call pt when Rx has been sent to the pharmacy, [phone] (home)

## 2018-11-07 ENCOUNTER — HOSPITAL ENCOUNTER (OUTPATIENT)
Dept: RADIOLOGY | Facility: HOSPITAL | Age: 58
Discharge: HOME | End: 2018-11-07
Attending: PAIN MEDICINE
Payer: MEDICARE

## 2018-11-07 ENCOUNTER — OFFICE VISIT (OUTPATIENT)
Dept: PAIN MEDICINE | Facility: CLINIC | Age: 58
End: 2018-11-07
Payer: MEDICARE

## 2018-11-07 VITALS
HEART RATE: 81 BPM | HEIGHT: 66 IN | BODY MASS INDEX: 44.68 KG/M2 | SYSTOLIC BLOOD PRESSURE: 174 MMHG | WEIGHT: 278 LBS | DIASTOLIC BLOOD PRESSURE: 93 MMHG | RESPIRATION RATE: 16 BRPM

## 2018-11-07 DIAGNOSIS — M47.816 LUMBAR SPONDYLOSIS: Primary | ICD-10-CM

## 2018-11-07 DIAGNOSIS — M47.816 LUMBAR SPONDYLOSIS: ICD-10-CM

## 2018-11-07 PROCEDURE — 3008F BODY MASS INDEX DOCD: CPT | Mod: CPTII,S$GLB,, | Performed by: PAIN MEDICINE

## 2018-11-07 PROCEDURE — 99999 PR PBB SHADOW E&M-EST. PATIENT-LVL IV: CPT | Mod: PBBFAC,,, | Performed by: PAIN MEDICINE

## 2018-11-07 PROCEDURE — 3080F DIAST BP >= 90 MM HG: CPT | Mod: CPTII,S$GLB,, | Performed by: PAIN MEDICINE

## 2018-11-07 PROCEDURE — 72110 X-RAY EXAM L-2 SPINE 4/>VWS: CPT | Mod: 26,,, | Performed by: RADIOLOGY

## 2018-11-07 PROCEDURE — 99204 OFFICE O/P NEW MOD 45 MIN: CPT | Mod: S$GLB,,, | Performed by: PAIN MEDICINE

## 2018-11-07 PROCEDURE — 72110 X-RAY EXAM L-2 SPINE 4/>VWS: CPT | Mod: TC,FY,PO

## 2018-11-07 PROCEDURE — 3077F SYST BP >= 140 MM HG: CPT | Mod: CPTII,S$GLB,, | Performed by: PAIN MEDICINE

## 2018-11-07 NOTE — PATIENT INSTRUCTIONS
-continue Cymbalta and naproxen as prescribed  -will schedule for bilateral L3-5 medial branch blocks  -follow up in clinic in 6 weeks

## 2018-11-07 NOTE — PROGRESS NOTES
Chief Pain Complaint:  Back Pain (low back pain, to the hips)      History of Present Illness:   This patient is a 58 y.o. female who presents today complaining of the above noted pain/s. The patient describes the pain as follows. Ms. 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  She has a history of low back pain which has been present for many years.  She recently moved [place] approximately 1 year ago from [place] where she was seen by several different pain physicians including Dr. Micah Reyes and Dr. Salomon performed several injections in her lumbar spine which have provided 6 -9 months of pain relief with each injection. [place] she sought to establish care with Dr. Lara at [facility] however after her 1st visitation she decided to seek treatment elsewhere.  After she had the unfortunate incident with the dislocation of the knee replacement she was bed-bound for approximately 24 months which ultimately led to a lot of her increased low back pain. She denies having radiation into her lower extremities and denies having weakness in her legs however she does have numbness in the right lower extremity secondary to numerous surgeries on the foot and ankle.  She denies having difficulty with bowel bladder.  She has taken Lyrica in the past which has provided some benefit however insurance no longer covers this medication and she has been taking naproxen which provides some benefit.    Previous Therapy:  Medications:  Lyrica, naproxen, Cymbalta  Injections:  Numerous lumbar injections in the past, unsure if these are epidural steroid injections or medial branch blocks  Surgeries:  Numerous  "right lower extremity surgeries  Physical Therapy:  Has participated in physical therapy in the past    Past Surgical History:   Procedure Laterality Date    BREAST SURGERY      breast lump removed    CARDIAC CATHETERIZATION Right 03/2018    wrist    CHOLECYSTECTOMY      foot drop surgery      HYSTERECTOMY      KNEE SURGERY      13     RECTAL SURGERY      TONSILLECTOMY       Imaging / Labs / Studies (reviewed on 11/7/2018):    Review of Systems:  CONSTITUTIONAL: patient denies any fever, chills, or weight loss  SKIN: patient denies any rash or itching  RESPIRATORY: patient denies having any shortness of breath  GASTROINTESTINAL: patient denies having any diarrhea, constipation, or bowel incontinence  GENITOURINARY: patient denies having any abnormal bladder function    MUSCULOSKELETAL:  - patient complains of the above noted pain/s (see chief pain complaint)    NEUROLOGICAL:   - pain as above  - strength in Lower extremities is decreased, on the RIGHT  - sensation in Lower extremities is abnormal, on the RIGHT  - patient denies any loss of bowel or bladder control      PSYCHIATRIC: patient denies any change in mood    Other:  All other systems reviewed and are negative    Physical Exam:  BP (!) 174/93   Pulse 81   Resp 16   Ht 5' 6" (1.676 m)   Wt 126.1 kg (278 lb)   BMI 44.87 kg/m²  (reviewed on 11/7/2018)\  General:  Alert and oriented, No acute distress.    HEENT:       EOMI.  Normocephalic, atraumatic.   Cardiovascular:  Regular rate.    Gastrointestinal:  Soft.    Respiratory:  Respirations are non-labored.    Cervical Spine:  No masses or atrophy,  Thoracic Spine:  Palpation normal.    Lumbar Spine:  No masses or atrophy,         Range of motion - limited Flexion, Extension,  Right Lat Bending,  Left Lat Bending        Increased pain with -left and right lateral bending, extension        Palpation - tender palpation over bilateral lower lumbar facets        PSIS tenderness - negative bilaterally   "           SLR - negative for radicular symptoms  Motor Exam:      Strength:  Rate on 1-5 scale Right Left   L2- hip flexion  5 5   L3- knee extension  5 5   L4- ankle dorsiflexion 3 5   L5- great toe extension 3 5   S1- ankle plantarflexion 3 5     Sensory Exam:  Full and equal sensation to light touch throughout.    Reflexes   Left DTR's     Knee.   2  Ankle.   1  Right DTR's    Knee.   2  Ankle.   1  Neurologic:  Cranial Nerves II-XII are grossly intact.    Pathologic reflexes:            Clonus - Neg   Psychiatric:  Cooperative.    Gait:  Very antalgic, patient has AFO    Assessment  Lumbar Spondylosis  Lumbar Degenerative Disc Disease    1. 58 y.o. year old patient with PMH of   Past Medical History:   Diagnosis Date    Anxiety 10/13/2017    Candidal dermatitis 10/13/2017    Genital herpes simplex 10/13/2017    Neuropathy of right foot 10/13/2017    Recurrent UTI 10/13/2017    Right foot drop 10/13/2017    Urinary incontinence 10/13/2017    Weakness of right lower extremity 10/13/2017      presenting with pain located lumbar spine  2. Pain Generators / Etiology :  Lumbar degenerative disc disease, lumbar spondylosis  3. Failed Meds (E- Effective, NE- Not Effective) Lyrica-effective, naproxen-effective, Cymbalta-effective  4. Physical Therapy - complete physical therapy for greater than 6 weeks in the past  5. Psychological comorbidities -  anxiety  6. Anticoagulants / Antiplatelets:  None     PLAN:  1. Medications continue taking all medications as prescribed  2. PT - patient has recently completed physical therapy for greater than 6 weeks which caused an increase in her pain  3. Psychological - continue Klonopin for anxiety  4. Labs - obtain  none; reviewed labs from 10/10/2017, creatinine 1.0  5. Imaging - obtain  none; will order lumbar spine x-ray today  6. Interventions - schedule bilateral L3-5 medial branches targeting the L4/L5 and L5/S1 facet joints  7. Referrals - none  8. Records - will try to  obtain records from Dr. Lara, Dr. Salomon, Dr. Reyes  9. Follow up visit - follow up in clinic in 6 weeks  10. Patient Questions - answered all patient's questions regarding diagnosis, therapy, treatment    LUPE Salmon MD  Interventional Pain  [facility] [place]

## 2018-11-07 NOTE — H&P (VIEW-ONLY)
Chief Pain Complaint:  Back Pain (low back pain, to the hips)      History of Present Illness:   This patient is a 58 y.o. female who presents today complaining of the above noted pain/s. The patient describes the pain as follows. Ms. 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  She has a history of low back pain which has been present for many years.  She recently moved bed roots approximately 1 year ago from [place] where she was seen by several different pain physicians including Dr. Micah Reyes and Dr. Salomon performed several injections in her lumbar spine which have provided 6 -9 months of pain relief with each injection. Upon arriving [place] bed [place] she sought to establish care with Dr. Lara at [facility] however after her 1st visitation she decided to seek treatment elsewhere.  After she had the unfortunate incident with the dislocation of the knee replacement she was bed-bound for approximately 24 months which ultimately led to a lot of her increased low back pain. She denies having radiation into her lower extremities and denies having weakness in her legs however she does have numbness in the right lower extremity secondary to numerous surgeries on the foot and ankle.  She denies having difficulty with bowel bladder.  She has taken Lyrica in the past which has provided some benefit however insurance no longer covers this medication and she has been taking naproxen which provides some benefit.    Previous Therapy:  Medications:  Lyrica, naproxen, Cymbalta  Injections:  Numerous lumbar injections in the past, unsure if these are epidural steroid injections or medial branch blocks  Surgeries:  Numerous  "right lower extremity surgeries  Physical Therapy:  Has participated in physical therapy in the past    Past Surgical History:   Procedure Laterality Date    BREAST SURGERY      breast lump removed    CARDIAC CATHETERIZATION Right 03/2018    wrist    CHOLECYSTECTOMY      foot drop surgery      HYSTERECTOMY      KNEE SURGERY      13     RECTAL SURGERY      TONSILLECTOMY       Imaging / Labs / Studies (reviewed on 11/7/2018):    Review of Systems:  CONSTITUTIONAL: patient denies any fever, chills, or weight loss  SKIN: patient denies any rash or itching  RESPIRATORY: patient denies having any shortness of breath  GASTROINTESTINAL: patient denies having any diarrhea, constipation, or bowel incontinence  GENITOURINARY: patient denies having any abnormal bladder function    MUSCULOSKELETAL:  - patient complains of the above noted pain/s (see chief pain complaint)    NEUROLOGICAL:   - pain as above  - strength in Lower extremities is decreased, on the RIGHT  - sensation in Lower extremities is abnormal, on the RIGHT  - patient denies any loss of bowel or bladder control      PSYCHIATRIC: patient denies any change in mood    Other:  All other systems reviewed and are negative    Physical Exam:  BP (!) 174/93   Pulse 81   Resp 16   Ht 5' 6" (1.676 m)   Wt 126.1 kg (278 lb)   BMI 44.87 kg/m²  (reviewed on 11/7/2018)\  General:  Alert and oriented, No acute distress.    HEENT:       EOMI.  Normocephalic, atraumatic.   Cardiovascular:  Regular rate.    Gastrointestinal:  Soft.    Respiratory:  Respirations are non-labored.    Cervical Spine:  No masses or atrophy,  Thoracic Spine:  Palpation normal.    Lumbar Spine:  No masses or atrophy,         Range of motion - limited Flexion, Extension,  Right Lat Bending,  Left Lat Bending        Increased pain with -left and right lateral bending, extension        Palpation - tender palpation over bilateral lower lumbar facets        PSIS tenderness - negative bilaterally   "           SLR - negative for radicular symptoms  Motor Exam:      Strength:  Rate on 1-5 scale Right Left   L2- hip flexion  5 5   L3- knee extension  5 5   L4- ankle dorsiflexion 3 5   L5- great toe extension 3 5   S1- ankle plantarflexion 3 5     Sensory Exam:  Full and equal sensation to light touch throughout.    Reflexes   Left DTR's     Knee.   2  Ankle.   1  Right DTR's    Knee.   2  Ankle.   1  Neurologic:  Cranial Nerves II-XII are grossly intact.    Pathologic reflexes:            Clonus - Neg   Psychiatric:  Cooperative.    Gait:  Very antalgic, patient has AFO    Assessment  Lumbar Spondylosis  Lumbar Degenerative Disc Disease    1. 58 y.o. year old patient with PMH of   Past Medical History:   Diagnosis Date    Anxiety 10/13/2017    Candidal dermatitis 10/13/2017    Genital herpes simplex 10/13/2017    Neuropathy of right foot 10/13/2017    Recurrent UTI 10/13/2017    Right foot drop 10/13/2017    Urinary incontinence 10/13/2017    Weakness of right lower extremity 10/13/2017      presenting with pain located lumbar spine  2. Pain Generators / Etiology :  Lumbar degenerative disc disease, lumbar spondylosis  3. Failed Meds (E- Effective, NE- Not Effective) Lyrica-effective, naproxen-effective, Cymbalta-effective  4. Physical Therapy - complete physical therapy for greater than 6 weeks in the past  5. Psychological comorbidities -  anxiety  6. Anticoagulants / Antiplatelets:  None     PLAN:  1. Medications continue taking all medications as prescribed  2. PT - patient has recently completed physical therapy for greater than 6 weeks which caused an increase in her pain  3. Psychological - continue Klonopin for anxiety  4. Labs - obtain  none; reviewed labs from 10/10/2017, creatinine 1.0  5. Imaging - obtain  none; will order lumbar spine x-ray today  6. Interventions - schedule bilateral L3-5 medial branches targeting the L4/L5 and L5/S1 facet joints  7. Referrals - none  8. Records - will try to  obtain records from Dr. Lara, Dr. Salomon, Dr. Reyes  9. Follow up visit - follow up in clinic in 6 weeks  10. Patient Questions - answered all patient's questions regarding diagnosis, therapy, treatment    LUPE Salmon MD  Interventional Pain  [facility] [place]

## 2018-11-07 NOTE — H&P (VIEW-ONLY)
Chief Pain Complaint:  Back Pain (low back pain, to the hips)      History of Present Illness:   This patient is a 58 y.o. female who presents today complaining of the above noted pain/s. The patient describes the pain as follows. Ms. 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  She has a history of low back pain which has been present for many years.  She recently moved bed roots approximately 1 year ago from [place] where she was seen by several different pain physicians including Dr. Micah Reyes and Dr. Salomon performed several injections in her lumbar spine which have provided 6 -9 months of pain relief with each injection. Upon arriving in bed [place] she sought to establish care with Dr. Lara at [facility] however after her 1st visitation she decided to seek treatment elsewhere.  After she had the unfortunate incident with the dislocation of the knee replacement she was bed-bound for approximately 24 months which ultimately led to a lot of her increased low back pain. She denies having radiation into her lower extremities and denies having weakness in her legs however she does have numbness in the right lower extremity secondary to numerous surgeries on the foot and ankle.  She denies having difficulty with bowel bladder.  She has taken Lyrica in the past which has provided some benefit however insurance no longer covers this medication and she has been taking naproxen which provides some benefit.    Previous Therapy:  Medications:  Lyrica, naproxen, Cymbalta  Injections:  Numerous lumbar injections in the past, unsure if these are epidural steroid injections or medial branch blocks  Surgeries:  Numerous  "right lower extremity surgeries  Physical Therapy:  Has participated in physical therapy in the past    Past Surgical History:   Procedure Laterality Date    BREAST SURGERY      breast lump removed    CARDIAC CATHETERIZATION Right 03/2018    wrist    CHOLECYSTECTOMY      foot drop surgery      HYSTERECTOMY      KNEE SURGERY      13     RECTAL SURGERY      TONSILLECTOMY       Imaging / Labs / Studies (reviewed on 11/7/2018):    Review of Systems:  CONSTITUTIONAL: patient denies any fever, chills, or weight loss  SKIN: patient denies any rash or itching  RESPIRATORY: patient denies having any shortness of breath  GASTROINTESTINAL: patient denies having any diarrhea, constipation, or bowel incontinence  GENITOURINARY: patient denies having any abnormal bladder function    MUSCULOSKELETAL:  - patient complains of the above noted pain/s (see chief pain complaint)    NEUROLOGICAL:   - pain as above  - strength in Lower extremities is decreased, on the RIGHT  - sensation in Lower extremities is abnormal, on the RIGHT  - patient denies any loss of bowel or bladder control      PSYCHIATRIC: patient denies any change in mood    Other:  All other systems reviewed and are negative    Physical Exam:  BP (!) 174/93   Pulse 81   Resp 16   Ht 5' 6" (1.676 m)   Wt 126.1 kg (278 lb)   BMI 44.87 kg/m²  (reviewed on 11/7/2018)\  General:  Alert and oriented, No acute distress.    HEENT:       EOMI.  Normocephalic, atraumatic.   Cardiovascular:  Regular rate.    Gastrointestinal:  Soft.    Respiratory:  Respirations are non-labored.    Cervical Spine:  No masses or atrophy,  Thoracic Spine:  Palpation normal.    Lumbar Spine:  No masses or atrophy,         Range of motion - limited Flexion, Extension,  Right Lat Bending,  Left Lat Bending        Increased pain with -left and right lateral bending, extension        Palpation - tender palpation over bilateral lower lumbar facets        PSIS tenderness - negative bilaterally   "           SLR - negative for radicular symptoms  Motor Exam:      Strength:  Rate on 1-5 scale Right Left   L2- hip flexion  5 5   L3- knee extension  5 5   L4- ankle dorsiflexion 3 5   L5- great toe extension 3 5   S1- ankle plantarflexion 3 5     Sensory Exam:  Full and equal sensation to light touch throughout.    Reflexes   Left DTR's     Knee.   2  Ankle.   1  Right DTR's    Knee.   2  Ankle.   1  Neurologic:  Cranial Nerves II-XII are grossly intact.    Pathologic reflexes:            Clonus - Neg   Psychiatric:  Cooperative.    Gait:  Very antalgic, patient has AFO    Assessment  Lumbar Spondylosis  Lumbar Degenerative Disc Disease    1. 58 y.o. year old patient with PMH of   Past Medical History:   Diagnosis Date    Anxiety 10/13/2017    Candidal dermatitis 10/13/2017    Genital herpes simplex 10/13/2017    Neuropathy of right foot 10/13/2017    Recurrent UTI 10/13/2017    Right foot drop 10/13/2017    Urinary incontinence 10/13/2017    Weakness of right lower extremity 10/13/2017      presenting with pain located lumbar spine  2. Pain Generators / Etiology :  Lumbar degenerative disc disease, lumbar spondylosis  3. Failed Meds (E- Effective, NE- Not Effective) Lyrica-effective, naproxen-effective, Cymbalta-effective  4. Physical Therapy - complete physical therapy for greater than 6 weeks in the past  5. Psychological comorbidities -  anxiety  6. Anticoagulants / Antiplatelets:  None     PLAN:  1. Medications continue taking all medications as prescribed  2. PT - patient has recently completed physical therapy for greater than 6 weeks which caused an increase in her pain  3. Psychological - continue Klonopin for anxiety  4. Labs - obtain  none; reviewed labs from 10/10/2017, creatinine 1.0  5. Imaging - obtain  none; will order lumbar spine x-ray today  6. Interventions - schedule bilateral L3-5 medial branches targeting the L4/L5 and L5/S1 facet joints  7. Referrals - none  8. Records - will try to  obtain records from Dr. Lara, Dr. Salomon, Dr. Reyes  9. Follow up visit - follow up in clinic in 6 weeks  10. Patient Questions - answered all patient's questions regarding diagnosis, therapy, treatment    LUPE Salmon MD  Interventional Pain  [facility] [place]

## 2018-11-14 ENCOUNTER — TELEPHONE (OUTPATIENT)
Dept: PAIN MEDICINE | Facility: CLINIC | Age: 58
End: 2018-11-14

## 2018-11-14 NOTE — TELEPHONE ENCOUNTER
Contacted patient; informed patient that we do not have any openings sooner than 11/23/18. Pt verbalized understanding.     ----- Message from Gema Farrell MA sent at 11/12/2018 11:44 AM CST -----  Contact: Pt      ----- Message -----  From: Vandana Garcia  Sent: 11/12/2018  11:35 AM  To: Viky Shrestha Staff    Pt called and stated she needs to know if there is a cancellation before 11/23/18 for an injection she would take it but if not she will keep her procedure date. She can be reached at [phone].    Thanks,  TF

## 2018-11-17 ENCOUNTER — OFFICE VISIT (OUTPATIENT)
Dept: URGENT CARE | Facility: CLINIC | Age: 58
End: 2018-11-17
Payer: MEDICARE

## 2018-11-17 VITALS
DIASTOLIC BLOOD PRESSURE: 82 MMHG | SYSTOLIC BLOOD PRESSURE: 124 MMHG | OXYGEN SATURATION: 99 % | TEMPERATURE: 99 F | WEIGHT: 288.69 LBS | HEART RATE: 113 BPM | RESPIRATION RATE: 16 BRPM | BODY MASS INDEX: 46.4 KG/M2 | HEIGHT: 66 IN

## 2018-11-17 DIAGNOSIS — R05.9 COUGH: ICD-10-CM

## 2018-11-17 DIAGNOSIS — J32.9 SINUSITIS, UNSPECIFIED CHRONICITY, UNSPECIFIED LOCATION: Primary | ICD-10-CM

## 2018-11-17 PROCEDURE — 99999 PR PBB SHADOW E&M-EST. PATIENT-LVL V: CPT | Mod: PBBFAC,,, | Performed by: NURSE PRACTITIONER

## 2018-11-17 PROCEDURE — 99214 OFFICE O/P EST MOD 30 MIN: CPT | Mod: S$GLB,,, | Performed by: NURSE PRACTITIONER

## 2018-11-17 PROCEDURE — 3079F DIAST BP 80-89 MM HG: CPT | Mod: CPTII,S$GLB,, | Performed by: NURSE PRACTITIONER

## 2018-11-17 PROCEDURE — 3074F SYST BP LT 130 MM HG: CPT | Mod: CPTII,S$GLB,, | Performed by: NURSE PRACTITIONER

## 2018-11-17 PROCEDURE — 3008F BODY MASS INDEX DOCD: CPT | Mod: CPTII,S$GLB,, | Performed by: NURSE PRACTITIONER

## 2018-11-17 RX ORDER — PROMETHAZINE HYDROCHLORIDE AND DEXTROMETHORPHAN HYDROBROMIDE 6.25; 15 MG/5ML; MG/5ML
5 SYRUP ORAL NIGHTLY PRN
Qty: 120 ML | Refills: 0 | Status: SHIPPED | OUTPATIENT
Start: 2018-11-17 | End: 2018-11-17 | Stop reason: SDUPTHER

## 2018-11-17 RX ORDER — AZITHROMYCIN 250 MG/1
TABLET, FILM COATED ORAL
Qty: 6 TABLET | Refills: 0 | Status: SHIPPED | OUTPATIENT
Start: 2018-11-17 | End: 2018-11-19 | Stop reason: ALTCHOICE

## 2018-11-17 RX ORDER — PROMETHAZINE HYDROCHLORIDE AND DEXTROMETHORPHAN HYDROBROMIDE 6.25; 15 MG/5ML; MG/5ML
5 SYRUP ORAL NIGHTLY PRN
Qty: 120 ML | Refills: 0 | Status: SHIPPED | OUTPATIENT
Start: 2018-11-17 | End: 2018-11-19

## 2018-11-17 RX ORDER — AZITHROMYCIN 250 MG/1
TABLET, FILM COATED ORAL
Qty: 6 TABLET | Refills: 0 | Status: SHIPPED | OUTPATIENT
Start: 2018-11-17 | End: 2018-11-17 | Stop reason: SDUPTHER

## 2018-11-17 NOTE — PROGRESS NOTES
"Subjective:       Patient ID: Rivka Jurado is a 58 y.o. female.    Chief Complaint: Cough and Sinus Problem    HPI  Ms Jurado presents with complaints of cough and congestion for five days. States she is having a back injection next week and she can't be sick. Feels like she might have had a fever but hasn't taken temperature. Requesting an antibiotic.   /82 (BP Location: Right arm, Patient Position: Sitting, BP Method: Large (Manual))   Pulse (!) 113   Temp 98.9 °F (37.2 °C) (Tympanic)   Resp 16   Ht 5' 6" (1.676 m)   Wt 131 kg (288 lb 11.1 oz)   SpO2 99%   BMI 46.60 kg/m²     Review of Systems   Constitutional: Positive for fever (Subjective has not measured). Negative for chills and diaphoresis.   HENT: Positive for congestion, postnasal drip and sore throat ([place]). Negative for ear discharge, ear pain and sinus pressure.    Respiratory: Positive for cough. Negative for chest tightness and shortness of breath.    Cardiovascular: Negative for chest pain and palpitations.   Gastrointestinal: Negative for abdominal pain, diarrhea, nausea and vomiting.   Genitourinary: Negative for difficulty urinating.   Musculoskeletal: Negative for myalgias.   Skin: Negative for rash and wound.   Allergic/Immunologic: Negative for immunocompromised state.   Neurological: Negative for headaches.   Hematological: Does not bruise/bleed easily.   Psychiatric/Behavioral: Negative for behavioral problems and confusion.       Objective:      Physical Exam   Constitutional: She is oriented to person, place, and time. She appears well-developed and well-nourished. No distress.   HENT:   Head: Normocephalic and atraumatic.   Right Ear: Tympanic membrane and ear canal normal.   Left Ear: Tympanic membrane and ear canal normal.   Nose: Nose normal. No mucosal edema. Right sinus exhibits no maxillary sinus tenderness and no frontal sinus tenderness. Left sinus exhibits no maxillary sinus tenderness and no frontal sinus " tenderness.   Mouth/Throat: Uvula is midline. No oropharyngeal exudate, posterior oropharyngeal edema or posterior oropharyngeal erythema.   Eyes: Conjunctivae and EOM are normal. Pupils are equal, round, and reactive to light.   Neck: Neck supple.   Cardiovascular: Normal rate, regular rhythm and intact distal pulses.   No murmur heard.  Pulmonary/Chest: Breath sounds normal. No respiratory distress. She has no wheezes.   Abdominal: Soft. Bowel sounds are normal. There is no tenderness.   Musculoskeletal: Normal range of motion. She exhibits no edema or deformity.   Lymphadenopathy:     She has no cervical adenopathy.   Neurological: She is alert and oriented to person, place, and time.   Skin: Skin is warm and dry. No rash noted. She is not diaphoretic.   Psychiatric: She has a normal mood and affect. Her behavior is normal.   Vitals reviewed.      Assessment:       1. Sinusitis, unspecified chronicity, unspecified location    2. Cough        Plan:       Rivka was seen today for cough and sinus problem.    Diagnoses and all orders for this visit:    Sinusitis, unspecified chronicity, unspecified location  -     Discontinue: azithromycin (Z-GRACIE) 250 MG tablet; As per packet instructions  -     azithromycin (Z-GRACIE) 250 MG tablet; As per packet instructions    Cough  -     Discontinue: promethazine-dextromethorphan (PROMETHAZINE-DM) 6.25-15 mg/5 mL Syrp; Take 5 mLs by mouth nightly as needed.  -     promethazine-dextromethorphan (PROMETHAZINE-DM) 6.25-15 mg/5 mL Syrp; Take 5 mLs by mouth nightly as needed.    Patient understands the risks of taking antibiotics that aren't medically indicated including risks for increased antibiotic resistance, hypersensitivity reaction, gastrointestinal upset including but not limited to bloating, cramping, or diarrhea, clostridium difficile infection, rash or drug eruption and oral or vulvovaginal candidiasis. Patient still wishes to be treated with antibiotic knowing and accepting  risks      Patient Instructions     Sinusitis (Antibiotic Treatment)    The sinuses are air-filled spaces within the bones of the face. They connect to the inside of the nose. Sinusitis is an inflammation of the tissue lining the sinus cavity. Sinus inflammation can occur during a cold. It can also be due to allergies to pollens and other particles in the air. Sinusitis can cause symptoms of sinus congestion and fullness. A sinus infection causes fever, headache and facial pain. There is often green or yellow drainage from the nose or into the back of the throat (post-nasal drip). You have been given antibiotics to treat this condition.  Home care:  · Take the full course of antibiotics as instructed. Do not stop taking them, even if you feel better.  · Drink plenty of water, hot tea, and other liquids. This may help thin mucus. It also may promote sinus drainage.  · Heat may help soothe painful areas of the face. Use a towel soaked in hot water. Or,  the shower and direct the hot spray onto your face. Using a vaporizer along with a menthol rub at night may also help.   · An expectorant containing guaifenesin may help thin the mucus and promote drainage from the sinuses.  · Over-the-counter decongestants may be used unless a similar medicine was prescribed. Nasal sprays work the fastest. Use one that contains phenylephrine or oxymetazoline. First blow the nose gently. Then use the spray. Do not use these medicines more often than directed on the label or symptoms may get worse. You may also use tablets containing pseudoephedrine. Avoid products that combine ingredients, because side effects may be increased. Read labels. You can also ask the pharmacist for help. (NOTE: Persons with high blood pressure should not use decongestants. They can raise blood pressure.)  · Over-the-counter antihistamines may help if allergies contributed to your sinusitis.    · Do not use nasal rinses or irrigation during an acute  sinus infection, unless told to by your health care provider. Rinsing may spread the infection to other sinuses.  · Use acetaminophen or ibuprofen to control pain, unless another pain medicine was prescribed. (If you have chronic liver or kidney disease or ever had a stomach ulcer, talk with your doctor before using these medicines. Aspirin should never be used in anyone under 18 years of age who is ill with a fever. It may cause severe liver damage.)  · Don't smoke. This can worsen symptoms.  Follow-up care  Follow up with your healthcare provider or our staff if you are not improving within the next week.  When to seek medical advice  Call your healthcare provider if any of these occur:  · Facial pain or headache becoming more severe  · Stiff neck  · Unusual drowsiness or confusion  · Swelling of the forehead or eyelids  · Vision problems, including blurred or double vision  · Fever of 100.4ºF (38ºC) or higher, or as directed by your healthcare provider  · Seizure  · Breathing problems  · Symptoms not resolving within 10 days  Date Last Reviewed: 4/13/2015  © 0043-6509 [employer]. [address] [place] [address], [place] [address]. All rights reserved. This information is not intended as a substitute for professional medical care. Always follow your healthcare professional's instructions.

## 2018-11-17 NOTE — PATIENT INSTRUCTIONS
Sinusitis (Antibiotic Treatment)    The sinuses are air-filled spaces within the bones of the face. They connect to the inside of the nose. Sinusitis is an inflammation of the tissue lining the sinus cavity. Sinus inflammation can occur during a cold. It can also be due to allergies to pollens and other particles in the air. Sinusitis can cause symptoms of sinus congestion and fullness. A sinus infection causes fever, headache and facial pain. There is often green or yellow drainage from the nose or into the back of the throat (post-nasal drip). You have been given antibiotics to treat this condition.  Home care:  · Take the full course of antibiotics as instructed. Do not stop taking them, even if you feel better.  · Drink plenty of water, hot tea, and other liquids. This may help thin mucus. It also may promote sinus drainage.  · Heat may help soothe painful areas of the face. Use a towel soaked in hot water. Or,  the shower and direct the hot spray onto your face. Using a vaporizer along with a menthol rub at night may also help.   · An expectorant containing guaifenesin may help thin the mucus and promote drainage from the sinuses.  · Over-the-counter decongestants may be used unless a similar medicine was prescribed. Nasal sprays work the fastest. Use one that contains phenylephrine or oxymetazoline. First blow the nose gently. Then use the spray. Do not use these medicines more often than directed on the label or symptoms may get worse. You may also use tablets containing pseudoephedrine. Avoid products that combine ingredients, because side effects may be increased. Read labels. You can also ask the pharmacist for help. (NOTE: Persons with high blood pressure should not use decongestants. They can raise blood pressure.)  · Over-the-counter antihistamines may help if allergies contributed to your sinusitis.    · Do not use nasal rinses or irrigation during an acute sinus infection, unless told to by  your health care provider. Rinsing may spread the infection to other sinuses.  · Use acetaminophen or ibuprofen to control pain, unless another pain medicine was prescribed. (If you have chronic liver or kidney disease or ever had a stomach ulcer, talk with your doctor before using these medicines. Aspirin should never be used in anyone under 18 years of age who is ill with a fever. It may cause severe liver damage.)  · Don't smoke. This can worsen symptoms.  Follow-up care  Follow up with your healthcare provider or our staff if you are not improving within the next week.  When to seek medical advice  Call your healthcare provider if any of these occur:  · Facial pain or headache becoming more severe  · Stiff neck  · Unusual drowsiness or confusion  · Swelling of the forehead or eyelids  · Vision problems, including blurred or double vision  · Fever of 100.4ºF (38ºC) or higher, or as directed by your healthcare provider  · Seizure  · Breathing problems  · Symptoms not resolving within 10 days  Date Last Reviewed: 4/13/2015  © 1522-3646 [employer]. [address] [place] [address], [place] [address]. All rights reserved. This information is not intended as a substitute for professional medical care. Always follow your healthcare professional's instructions.

## 2018-11-19 ENCOUNTER — HOSPITAL ENCOUNTER (OUTPATIENT)
Dept: RADIOLOGY | Facility: HOSPITAL | Age: 58
Discharge: HOME | End: 2018-11-19
Attending: FAMILY MEDICINE
Payer: MEDICARE

## 2018-11-19 ENCOUNTER — TELEPHONE (OUTPATIENT)
Dept: INTERNAL MEDICINE | Facility: CLINIC | Age: 58
End: 2018-11-19

## 2018-11-19 ENCOUNTER — OFFICE VISIT (OUTPATIENT)
Dept: INTERNAL MEDICINE | Facility: CLINIC | Age: 58
End: 2018-11-19
Payer: MEDICARE

## 2018-11-19 VITALS
DIASTOLIC BLOOD PRESSURE: 84 MMHG | OXYGEN SATURATION: 99 % | WEIGHT: 283.75 LBS | HEART RATE: 110 BPM | SYSTOLIC BLOOD PRESSURE: 132 MMHG | TEMPERATURE: 99 F | BODY MASS INDEX: 45.6 KG/M2 | HEIGHT: 66 IN

## 2018-11-19 DIAGNOSIS — R10.13 ABDOMINAL PAIN, ACUTE, EPIGASTRIC: ICD-10-CM

## 2018-11-19 DIAGNOSIS — R11.2 NON-INTRACTABLE VOMITING WITH NAUSEA, UNSPECIFIED VOMITING TYPE: ICD-10-CM

## 2018-11-19 DIAGNOSIS — R10.13 ABDOMINAL PAIN, ACUTE, EPIGASTRIC: Primary | ICD-10-CM

## 2018-11-19 PROCEDURE — 99215 OFFICE O/P EST HI 40 MIN: CPT | Mod: S$GLB,,, | Performed by: FAMILY MEDICINE

## 2018-11-19 PROCEDURE — 71046 X-RAY EXAM CHEST 2 VIEWS: CPT | Mod: 26,,, | Performed by: RADIOLOGY

## 2018-11-19 PROCEDURE — 3008F BODY MASS INDEX DOCD: CPT | Mod: CPTII,S$GLB,, | Performed by: FAMILY MEDICINE

## 2018-11-19 PROCEDURE — 74019 RADEX ABDOMEN 2 VIEWS: CPT | Mod: TC,FY,PO

## 2018-11-19 PROCEDURE — 71046 X-RAY EXAM CHEST 2 VIEWS: CPT | Mod: TC,FY,PO

## 2018-11-19 PROCEDURE — 3075F SYST BP GE 130 - 139MM HG: CPT | Mod: CPTII,S$GLB,, | Performed by: FAMILY MEDICINE

## 2018-11-19 PROCEDURE — 3079F DIAST BP 80-89 MM HG: CPT | Mod: CPTII,S$GLB,, | Performed by: FAMILY MEDICINE

## 2018-11-19 PROCEDURE — 74019 RADEX ABDOMEN 2 VIEWS: CPT | Mod: 26,,, | Performed by: RADIOLOGY

## 2018-11-19 PROCEDURE — 99999 PR PBB SHADOW E&M-EST. PATIENT-LVL III: CPT | Mod: PBBFAC,,, | Performed by: FAMILY MEDICINE

## 2018-11-19 RX ORDER — ESOMEPRAZOLE MAGNESIUM 40 MG/1
40 CAPSULE, DELAYED RELEASE ORAL
Qty: 60 CAPSULE | Refills: 0 | Status: SHIPPED | OUTPATIENT
Start: 2018-11-19 | End: 2018-11-19 | Stop reason: SDUPTHER

## 2018-11-19 RX ORDER — DIPHENOXYLATE HYDROCHLORIDE AND ATROPINE SULFATE 2.5; .025 MG/1; MG/1
2 TABLET ORAL 4 TIMES DAILY PRN
Qty: 20 TABLET | Refills: 0 | Status: SHIPPED | OUTPATIENT
Start: 2018-11-19 | End: 2019-02-11

## 2018-11-19 RX ORDER — ESOMEPRAZOLE MAGNESIUM 40 MG/1
40 CAPSULE, DELAYED RELEASE ORAL
Qty: 60 CAPSULE | Refills: 0 | Status: SHIPPED | OUTPATIENT
Start: 2018-11-19 | End: 2019-05-01 | Stop reason: SDUPTHER

## 2018-11-19 RX ORDER — PROMETHAZINE HYDROCHLORIDE 25 MG/1
25 TABLET ORAL EVERY 6 HOURS PRN
Qty: 20 TABLET | Refills: 0 | Status: SHIPPED | OUTPATIENT
Start: 2018-11-19 | End: 2019-10-10

## 2018-11-19 RX ORDER — PROMETHAZINE HYDROCHLORIDE 25 MG/1
25 TABLET ORAL EVERY 6 HOURS PRN
Qty: 20 TABLET | Refills: 0 | Status: SHIPPED | OUTPATIENT
Start: 2018-11-19 | End: 2018-11-19 | Stop reason: SDUPTHER

## 2018-11-19 NOTE — TELEPHONE ENCOUNTER
----- Message from Andria Kirk sent at 11/16/2018  3:21 PM CST -----  Contact: pt  She's calling to see if she can get a Rx for a z-pack due to her coughing and being congested, sent to [facility] on [address], please advise [phone] (home)

## 2018-11-19 NOTE — PROGRESS NOTES
Subjective:   Patient ID: Rivka Jurado is a 58 y.o. female.  Chief Complaint:  Abdominal Pain and Nausea      Patient presents for evaluation of nonspecific abdominal pain with nausea / vomiting after recent URI type symptoms.      Abdominal Pain   This is a new problem. The current episode started in the past 7 days. The onset quality is sudden. The problem occurs constantly. The problem has been unchanged. The pain is located in the generalized abdominal region and epigastric region. The pain is at a severity of 5/10. The pain is moderate. The quality of the pain is aching and cramping. The abdominal pain does not radiate. Associated symptoms include anorexia, headaches, nausea and vomiting. Pertinent negatives include no arthralgias, belching, constipation, diarrhea, dysuria, fever, flatus, frequency, hematochezia, hematuria, melena or myalgias. Nothing aggravates the pain. The pain is relieved by nothing. Treatments tried: Zofran and Imodium. The treatment provided no relief. Her past medical history is significant for abdominal surgery, gallstones and GERD. There is no history of colon cancer, Crohn's disease, irritable bowel syndrome, pancreatitis, PUD or ulcerative colitis. Patient's medical history includes UTI. Patient's medical history does not include kidney stones.       Review of Systems   Constitutional: Positive for appetite change and fatigue. Negative for chills and fever.   HENT: Negative for congestion, ear pain, postnasal drip, rhinorrhea, sinus pressure, sore throat and trouble swallowing.    Respiratory: Negative for cough and shortness of breath.    Cardiovascular: Negative for chest pain and leg swelling.   Gastrointestinal: Positive for abdominal pain, anorexia, nausea and vomiting. Negative for blood in stool, constipation, diarrhea, flatus, hematochezia and melena.   Genitourinary: Negative for difficulty urinating, dysuria, flank pain, frequency and hematuria.   Musculoskeletal: Negative  "for arthralgias and myalgias.   Skin: Negative for rash.   Neurological: Positive for headaches. Negative for dizziness, syncope, weakness and light-headedness.     Objective:   /84 (BP Location: Right arm, Patient Position: Sitting, BP Method: Large (Manual))   Pulse 110   Temp 98.9 °F (37.2 °C) (Tympanic)   Ht 5' 6" (1.676 m)   Wt 128.7 kg (283 lb 11.7 oz)   SpO2 99%   BMI 45.80 kg/m²     Physical Exam   Constitutional: Vital signs are normal. She appears well-developed and well-nourished.  Non-toxic appearance. She does not have a sickly appearance. She does not appear ill. No distress.   HENT:   Mouth/Throat: Uvula is midline, oropharynx is clear and moist and mucous membranes are normal.   Eyes: No scleral icterus.   Neck: Normal range of motion. Neck supple.   Cardiovascular: Normal rate, regular rhythm and normal heart sounds. Exam reveals no gallop and no friction rub.   No murmur heard.  Pulmonary/Chest: Effort normal and breath sounds normal.   Abdominal: Soft. Normal appearance. She exhibits no distension, no fluid wave, no ascites and no mass. Bowel sounds are increased. There is no hepatosplenomegaly. There is tenderness in the epigastric area. There is no rigidity, no rebound, no guarding and no CVA tenderness.   Lymphadenopathy:     She has no cervical adenopathy.        Right: No inguinal adenopathy present.        Left: No inguinal adenopathy present.   Skin: Skin is warm, dry and intact. No rash noted.   Psychiatric: She has a normal mood and affect. Her behavior is normal. Judgment normal. Cognition and memory are normal.     Assessment:     1. Abdominal pain, acute, epigastric    2. Non-intractable vomiting with nausea, unspecified vomiting type      Plan:   Abdominal pain, acute, epigastric  Non-intractable vomiting with nausea, unspecified vomiting type  -     CBC auto differential; Future; Expected date: 11/19/2018  -     Comprehensive metabolic panel; Future; Expected date: " 11/19/2018  -     Lipase; Future; Expected date: 11/19/2018  -     X-Ray Abdomen Flat And Erect; Future; Expected date: 11/19/2018  -     Urinalysis; Future; Expected date: 11/19/2018  -     Urine culture; Future; Expected date: 11/19/2018  -     diphenoxylate-atropine 2.5-0.025 mg (LOMOTIL) 2.5-0.025 mg per tablet; Take 2 tablets by mouth 4 (four) times daily as needed for Diarrhea.  Dispense: 20 tablet; Refill: 0  -     X-Ray Chest PA And Lateral; Future; Expected date: 11/19/2018  -     promethazine (PHENERGAN) 25 MG tablet; Take 1 tablet (25 mg total) by mouth every 6 (six) hours as needed for Nausea.  Dispense: 20 tablet; Refill: 0  -     esomeprazole (NEXIUM) 40 MG capsule; Take 1 capsule (40 mg total) by mouth 2 (two) times daily before meals.  Dispense: 60 capsule; Refill: 0    Unclear etiology for abdominal pain.    Clinically adequately hydrated.  Hemodynamically stable.    Phenergan for nausea, Lomotil for diarrhea, Nexium for epigastric pain.    Check labs and imaging above.    Treat for any abnormalities indicated.    If all normal, with no significant improvement, needs to see GI.    If any worsening despite above needs to go to the ER.    Patient expresses understanding and agreement to the above plan.

## 2018-11-23 ENCOUNTER — HOSPITAL ENCOUNTER (OUTPATIENT)
Facility: HOSPITAL | Age: 58
Discharge: HOME | End: 2018-11-23
Attending: PAIN MEDICINE | Admitting: PAIN MEDICINE
Payer: MEDICARE

## 2018-11-23 VITALS
RESPIRATION RATE: 20 BRPM | DIASTOLIC BLOOD PRESSURE: 88 MMHG | OXYGEN SATURATION: 100 % | SYSTOLIC BLOOD PRESSURE: 157 MMHG | TEMPERATURE: 98 F | HEART RATE: 92 BPM

## 2018-11-23 DIAGNOSIS — M47.816 LUMBAR SPONDYLOSIS: ICD-10-CM

## 2018-11-23 PROCEDURE — 64493 INJ PARAVERT F JNT L/S 1 LEV: CPT | Mod: 50,,, | Performed by: PAIN MEDICINE

## 2018-11-23 PROCEDURE — 64495 INJ PARAVERT F JNT L/S 3 LEV: CPT | Mod: 50,,, | Performed by: PAIN MEDICINE

## 2018-11-23 PROCEDURE — 64494 INJ PARAVERT F JNT L/S 2 LEV: CPT | Mod: 50,,, | Performed by: PAIN MEDICINE

## 2018-11-23 PROCEDURE — 25000003 PHARM REV CODE 250: Performed by: PAIN MEDICINE

## 2018-11-23 PROCEDURE — 25000003 PHARM REV CODE 250

## 2018-11-23 PROCEDURE — 63600175 PHARM REV CODE 636 W HCPCS: Performed by: PAIN MEDICINE

## 2018-11-23 PROCEDURE — 63600175 PHARM REV CODE 636 W HCPCS

## 2018-11-23 RX ORDER — FENTANYL CITRATE 50 UG/ML
INJECTION, SOLUTION INTRAMUSCULAR; INTRAVENOUS
Status: DISCONTINUED | OUTPATIENT
Start: 2018-11-23 | End: 2018-11-23 | Stop reason: HOSPADM

## 2018-11-23 RX ORDER — LIDOCAINE HYDROCHLORIDE 20 MG/ML
INJECTION, SOLUTION EPIDURAL; INFILTRATION; INTRACAUDAL; PERINEURAL
Status: DISCONTINUED | OUTPATIENT
Start: 2018-11-23 | End: 2018-11-23 | Stop reason: HOSPADM

## 2018-11-23 RX ORDER — MIDAZOLAM HYDROCHLORIDE 1 MG/ML
INJECTION, SOLUTION INTRAMUSCULAR; INTRAVENOUS
Status: DISCONTINUED | OUTPATIENT
Start: 2018-11-23 | End: 2018-11-23 | Stop reason: HOSPADM

## 2018-11-23 NOTE — PLAN OF CARE
Problem: Patient Care Overview  Goal: Plan of Care Review  Outcome: Outcome(s) achieved Date Met: 11/23/18  Patient d/c home in stable condition via wheelchair with ride. Verbalized understanding of d/c instructions. Patient voiced no complaints at this time. Patient stood at side of bed, walked steps with no new motor deficits. Neurologically intact.

## 2018-11-23 NOTE — OP NOTE
Procedure: Lumbar Medial Branch Block under Fluoroscopic Guidance    Side: bilateral     Level:  Sacral ala (Corresponding to the L5 dorsal ramus), L5 transverse process (Corresponding to the L4 medial branch) and L4 transverse process (Corresponding to the L3 medial branch)     PROCEDURE DATE: 11/2/2018    Pre-operative Diagnosis: Lumbar Spondylosis  Post-operative Diagnosis: Lumbar Spondylosis    Provider: LUPE aSlmon MD  Assistant(s): none    Anesthesia: Local, No Sedation    >> 1 mg of VERSED    >> 25 mcg of FENTANYL     Indication: Low back pain without radiculopathy. Symptoms unresponsive to conservative treatments. Fluoroscopy was used to optimize visualization of needle placement and to maximize safety.     Procedure Description / Technique:  The patient was seen and identified in the preoperative area. Risks, benefits, complications, and alternatives were discussed with the patient. The patient agreed to proceed with the procedure and signed the consent. The site and side of the procedure was identified and marked. No iv was started.     The patient was taken to the procedural suite and positioned in prone orientation on the procedure table. A pillow was placed under the abdomen to reduce lumbar lordosis. A time out was performed. The procedure, site, side, and allergies were stated and agreed to by all present. The lumbosacral area was widely prepped with ChloraPrep. The procedural site was draped in usual sterile fashion. Vital signs were closely monitored throughout this procedure. Conscious sedation was NOT used for this procedure.    The Left sacral ala and superior articular process was identified and marked on AP fluoroscopic imaging. Subcutaneous tissues were localized using 1% PF Lidocaine to improve patient comfort. A 25 gauge 3.5 inch spinal needle was advance until the needle rested on OS at the interface of the sacral ala and the base of the sacral superior articular process. After negative  "aspiration, 1ml of 2% lidocaine was injected. No pain or paresthesia was noted on injection. After bilateral injection, the fluoroscope was rotated into the oblique view and the skin was anesthetized using 1% lidocaine and a 25 guauge 3.5 inch needle was advanced at the L3/4 and L4/5 level aiming at the "eye of the Lefty Dog" until Os was contacted.  1ml of 2% lidocaine after negative aspiration was injected at each site. No pain or paresthesia was noted. This technique was repeated at each of the above noted levels. The spinal needle was removed intact following injection at each targeted site. The stylet was replaced prior to needle removal at each site.    Description of Findings: Not applicable    Prosthetic devices, grafts, tissues, or devices implanted: None    Specimen Removed: No    ESTIMATED BLOOD LOSS: minimal    COMPLICATIONS: None    DISPOSITION / PLANS: The patient was transferred to the recovery area in a stable condition for observation. The patient was reexamined prior to discharge. There was no evidence of acute neurologic injury following the procedure.  Patient was discharged from the recovery room after meeting discharge criteria. Home discharge instructions were given to the patient by the staff.    "

## 2018-11-26 ENCOUNTER — TELEPHONE (OUTPATIENT)
Dept: PAIN MEDICINE | Facility: CLINIC | Age: 58
End: 2018-11-26

## 2018-11-26 NOTE — TELEPHONE ENCOUNTER
No answer. Left message to return call.     ----- Message from Gema Farrell MA sent at 11/26/2018  9:57 AM CST -----  Contact: pt   Spoke with mrs zheng she is no better would like to discuss another type injection   ----- Message -----  From: Tariq Bey  Sent: 11/26/2018   8:14 AM  To: Viky Shrestha Staff    Pt had back injection and states had no pain relief, pt would like to joe for steroid injection.           ..[phone] (home)

## 2018-11-27 ENCOUNTER — TELEPHONE (OUTPATIENT)
Dept: PAIN MEDICINE | Facility: CLINIC | Age: 58
End: 2018-11-27

## 2018-11-27 NOTE — TELEPHONE ENCOUNTER
Contacted patient; received update after patient had injections on 11/23/18. Patient reports that she had no relief Friday evening/night however Saturday she reports a pain level of 2-3/10. Informed patient that I will give the update to Dr. Salmon to see what he suggest. Pt verbalized understanding.     ----- Message from Tiffany Maldonado sent at 11/27/2018  9:56 AM CST -----  Pt is requesting a call from nurse to discuss her injection.            Please call pt back at [phone]

## 2018-11-27 NOTE — DISCHARGE SUMMARY
[facility] -   Short Stay  Discharge Summary    Admit Date: 11/23/2018    Discharge Date and Time: 11/23/2018 12:06 PM      Discharge Attending Physician: No att. providers found     Hospital Course (synopsis of major diagnoses, care, treatment, and services provided during the course of the hospital stay): Patient was admitted to Pre-op where informed consent was signed.  The patient was then taken to the procedure suite where the procedure was performed.  The patient was then return to the Pre-Op area and discharge was performed.     Final Diagnoses:    Principal Problem: <principal problem not specified>   Secondary Diagnoses: There are no hospital problems to display for this patient.      Discharged Condition: good    Disposition: Home or Self Care    Follow up/Patient Instructions:    Medications:  Reconciled Home Medications:      Medication List      CONTINUE taking these medications    benazepril 20 MG tablet  Commonly known as:  LOTENSIN  Take 1 tablet (20 mg total) by mouth once daily.     clonazePAM 1 MG tablet  Commonly known as:  KLONOPIN  Take 1 tablet (1 mg total) by mouth every evening.     diphenoxylate-atropine 2.5-0.025 mg 2.5-0.025 mg per tablet  Commonly known as:  LOMOTIL  Take 2 tablets by mouth 4 (four) times daily as needed for Diarrhea.     DULoxetine 30 MG capsule  Commonly known as:  CYMBALTA  Take 2 capsules (60 mg total) by mouth once daily.     escitalopram oxalate 10 MG tablet  Commonly known as:  LEXAPRO  Take 10 mg by mouth once daily.     esomeprazole 40 MG capsule  Commonly known as:  NEXIUM  Take 1 capsule (40 mg total) by mouth 2 (two) times daily before meals.     FLUARIX QUAD 2539-0129 (PF) 60 mcg (15 mcg x 4)/0.5 mL Syrg vaccine  Generic drug:  influenza     fluconazole 200 MG Tab  Commonly known as:  DIFLUCAN  TAKE 1 TABLET BY MOUTH TWICE A WEEK     fluticasone 50 mcg/actuation nasal spray  Commonly known as:  FLONASE  USE 2 SPRAYS IN EACH NOSTRIL EVERY DAY      hydrocortisone 2.5 % rectal cream  Commonly known as:  ANUSOL-HC  Place rectally as needed for Hemorrhoids.     metroNIDAZOLE 0.75 % gel  Commonly known as:  METROGEL  AAA of face bid     montelukast 10 mg tablet  Commonly known as:  SINGULAIR  Take 1 tablet (10 mg total) by mouth once daily.     mupirocin 2 % ointment  Commonly known as:  BACTROBAN  Apply topically 2 (two) times daily.     naproxen 500 MG tablet  Commonly known as:  NAPROSYN  Take 1 tablet (500 mg total) by mouth 2 (two) times daily with meals.     nitrofurantoin (macrocrystal-monohydrate) 100 MG capsule  Commonly known as:  MACROBID  TAKE 1 CAPSULE BY MOUTH EVERY EVENING     nystatin powder  Commonly known as:  NYSTOP  Apply topically 2 (two) times daily.     nystatin-triamcinolone cream  Commonly known as:  MYCOLOG II  Apply topically 2 (two) times daily.     pregabalin 75 MG capsule  Commonly known as:  LYRICA  Take 1 capsule (75 mg total) by mouth 2 (two) times daily.     promethazine 25 MG tablet  Commonly known as:  PHENERGAN  Take 1 tablet (25 mg total) by mouth every 6 (six) hours as needed for Nausea.     sulfacetamide sodium 10 % Clsr  Use as face wash daily     tiZANidine 4 MG tablet  Commonly known as:  ZANAFLEX  Take 2 tablets (8 mg total) by mouth every evening.     tolterodine 4 MG 24 hr capsule  Commonly known as:  DETROL LA  Take 1 capsule (4 mg total) by mouth every evening.     triamcinolone acetonide 0.1% 0.1 % cream  Commonly known as:  KENALOG  Apply topically as needed.     valACYclovir 1000 MG tablet  Commonly known as:  VALTREX  Take 1 tablet (1,000 mg total) by mouth once daily.          No discharge procedures on file.

## 2018-11-29 ENCOUNTER — TELEPHONE (OUTPATIENT)
Dept: INTERNAL MEDICINE | Facility: CLINIC | Age: 58
End: 2018-11-29

## 2018-11-29 NOTE — TELEPHONE ENCOUNTER
----- Message from Lizeth Antonio sent at 11/29/2018  9:49 AM CST -----  Rosalba w/[facility] states that she has a Rx for esomeprazole (NEXIUM) 40 MG capsule and is trying to get the diagnosis. Call caller at [phone]

## 2018-11-30 ENCOUNTER — TELEPHONE (OUTPATIENT)
Dept: PAIN MEDICINE | Facility: CLINIC | Age: 58
End: 2018-11-30

## 2018-11-30 DIAGNOSIS — M47.816 LUMBAR SPONDYLOSIS: Primary | ICD-10-CM

## 2018-11-30 NOTE — TELEPHONE ENCOUNTER
11/23/18 bilat L3-5 MBB with local anesthesia-. Pt reports no pain relief  Immediately after procedure or day of, pain was worse. But 11/24/18 no pain. Pt also states pain is intermittent so unable to tell.

## 2018-12-06 ENCOUNTER — TELEPHONE (OUTPATIENT)
Dept: PAIN MEDICINE | Facility: CLINIC | Age: 58
End: 2018-12-06

## 2018-12-07 ENCOUNTER — HOSPITAL ENCOUNTER (OUTPATIENT)
Facility: HOSPITAL | Age: 58
Discharge: HOME | End: 2018-12-07
Attending: PAIN MEDICINE | Admitting: PAIN MEDICINE
Payer: MEDICARE

## 2018-12-07 VITALS
RESPIRATION RATE: 18 BRPM | DIASTOLIC BLOOD PRESSURE: 79 MMHG | SYSTOLIC BLOOD PRESSURE: 140 MMHG | HEART RATE: 85 BPM | OXYGEN SATURATION: 100 %

## 2018-12-07 DIAGNOSIS — M47.816 LUMBAR SPONDYLOSIS: ICD-10-CM

## 2018-12-07 PROCEDURE — 25000003 PHARM REV CODE 250: Performed by: PAIN MEDICINE

## 2018-12-07 PROCEDURE — 64494 INJ PARAVERT F JNT L/S 2 LEV: CPT | Mod: 50,,, | Performed by: PAIN MEDICINE

## 2018-12-07 PROCEDURE — 25000003 PHARM REV CODE 250

## 2018-12-07 PROCEDURE — 63600175 PHARM REV CODE 636 W HCPCS

## 2018-12-07 PROCEDURE — 63600175 PHARM REV CODE 636 W HCPCS: Performed by: PAIN MEDICINE

## 2018-12-07 PROCEDURE — 64493 INJ PARAVERT F JNT L/S 1 LEV: CPT | Mod: 50,,, | Performed by: PAIN MEDICINE

## 2018-12-07 RX ORDER — MIDAZOLAM HYDROCHLORIDE 1 MG/ML
INJECTION, SOLUTION INTRAMUSCULAR; INTRAVENOUS
Status: DISCONTINUED | OUTPATIENT
Start: 2018-12-07 | End: 2018-12-07 | Stop reason: HOSPADM

## 2018-12-07 RX ORDER — SODIUM CHLORIDE, SODIUM LACTATE, POTASSIUM CHLORIDE, CALCIUM CHLORIDE 600; 310; 30; 20 MG/100ML; MG/100ML; MG/100ML; MG/100ML
INJECTION, SOLUTION INTRAVENOUS CONTINUOUS
Status: DISCONTINUED | OUTPATIENT
Start: 2018-12-07 | End: 2018-12-07 | Stop reason: HOSPADM

## 2018-12-07 RX ORDER — METHYLPREDNISOLONE ACETATE 40 MG/ML
INJECTION, SUSPENSION INTRA-ARTICULAR; INTRALESIONAL; INTRAMUSCULAR; SOFT TISSUE
Status: DISCONTINUED | OUTPATIENT
Start: 2018-12-07 | End: 2018-12-07 | Stop reason: HOSPADM

## 2018-12-07 RX ORDER — LIDOCAINE HYDROCHLORIDE 20 MG/ML
INJECTION, SOLUTION EPIDURAL; INFILTRATION; INTRACAUDAL; PERINEURAL
Status: DISCONTINUED | OUTPATIENT
Start: 2018-12-07 | End: 2018-12-07 | Stop reason: HOSPADM

## 2018-12-07 RX ORDER — FENTANYL CITRATE 50 UG/ML
INJECTION, SOLUTION INTRAMUSCULAR; INTRAVENOUS
Status: DISCONTINUED | OUTPATIENT
Start: 2018-12-07 | End: 2018-12-07 | Stop reason: HOSPADM

## 2018-12-07 NOTE — OP NOTE
PROCEDURE: Lumbar epidural steroid injection and Lumbar facet injection under fluoroscopic guidance    LEVEL: L4/5 and L5/S1    SIDE: Bilateral    PROCEDURE DATE: 12/7/2018    PREOPERATIVE DIAGNOSIS: Lumbar spondylosis  POSTOPERATIVE DIAGNOSIS: Lumbar spondylosis    PROVIDER: LUPE Salmon MD  Assistant(s): None    ANESTHESIA: Local    INDICATION: The patient has low back pain unresponsive to conservative treatments. Fluoroscopy was used to optimize visualization of needle placement and to maximize safety.        PROCEDURE DESCRIPTION / TECHNIQUE:   The patient was seen and identified in the preoperative area. Risks, benefits, complications, and alternatives were discussed with the patient. The patient agreed to proceed with the procedure and signed the consent. The site and side of the procedure was identified and marked.    The patient was taken to the procedural suite. The patient was positioned in prone orientation on procedure table and a pillow was placed under the abdomen to reduce lumbar lordosis. A time out was performed prior to any intervention. The procedure, site, side, and allergies were stated and agreed to by all present. The lumbosacral area was widely prepped with ChloraPrep. The procedural site was draped in usual sterile fashion. Vital signs were closely monitored throughout this procedure.     The left L5/S1 superior articular process was identified on AP fluoroscopic imaging. The fluoroscope was obliqued until the joint space became visible in linear array.  A 22 gauge 5 inch spinal needle was advance into the subcutaneous tissues and 1% Lidocaine was instilled. The needle was then advanced until it reached the intraarticular space. After negative aspiration, 1ml of 1% lidocaine and 80 depomedrol solution was injected. No pain or paresthesia was noted on injection. This technique was repeated for the contralateral side and at other levels. The spinal needle was removed intact following  injection at each targeted site. The stylet was replaced prior to needle removal at each site.    Description of Findings: Not applicable    Prosthetic devices, grafts, tissues, or devices implanted: None    Specimen Removed: No    Estimated Blood Loss: minimal    COMPLICATIONS: No complications, after the right L5/S1 facet joint injection, the tubing was flushed using contrast and there was noted uptake into the epidural space.  Patient felt some sensation in right buttock and hip area which dissipated very quickly.      DISPOSITION / PLANS: The patient was transferred to the recovery area in a stable condition for observation. The patient was reexamined prior to discharge. There was no evidence of acute neurologic injury following the procedure.  Patient was discharged from the recovery room after meeting discharge criteria. Home discharge instructions were given to the patient by the staff.

## 2018-12-07 NOTE — NURSING
Patient received 1 mg of versed and 50 mcg of fentanyl during procedure.  I wasted 1 mg of versed and 50 mcg of fentanyl with Cat GUSTAFSON RN

## 2018-12-07 NOTE — INTERVAL H&P NOTE
The patient has been examined and the H&P has been reviewed:    Patient did not receive benefit from lumbar medial branch blocks, will proceed with bilateral L4/5 and L5/S1 intra articular facet joint injections.    Anesthesia/Surgery risks, benefits and alternative options discussed and understood by patient/family.          There are no hospital problems to display for this patient.

## 2018-12-07 NOTE — PLAN OF CARE
Problem: Patient Care Overview  Goal: Plan of Care Review  Outcome: Outcome(s) achieved Date Met: 12/07/18  Patient d/c home in stable condition via wheelchair with ride. Verbalized understanding of d/c instructions. Patient voiced no complaints at this time. Patient stood at side of bed, walked steps with no new motor deficits. Neurologically intact.

## 2018-12-17 ENCOUNTER — OFFICE VISIT (OUTPATIENT)
Dept: PSYCHIATRY | Facility: CLINIC | Age: 58
End: 2018-12-17
Payer: COMMERCIAL

## 2018-12-17 DIAGNOSIS — F33.1 MAJOR DEPRESSIVE DISORDER, RECURRENT EPISODE, MODERATE: Primary | ICD-10-CM

## 2018-12-17 PROCEDURE — 90834 PSYTX W PT 45 MINUTES: CPT | Mod: S$GLB,,, | Performed by: SOCIAL WORKER

## 2018-12-18 ENCOUNTER — PATIENT OUTREACH (OUTPATIENT)
Dept: ADMINISTRATIVE | Facility: HOSPITAL | Age: 58
End: 2018-12-18

## 2018-12-18 NOTE — PROGRESS NOTES
"Individual Psychotherapy (PhD/LCSW)    12/17/2018    [facility]         Therapeutic Intervention: Met with patient.  Outpatient - Insight oriented psychotherapy 45 min - CPT code 19890    Chief complaint/reason for encounter: depression     Interval history and content of current session:  Patient presents to ongoing individual therapy due to depression.  She has not been to session since 6/7/18.  She states that in October she woke up one morning and she decided to "let go" of her children.  She sent her son a long text that it was the last time she was going to contact him.  She told him that she would always love him because he was her son.  She notes since that time she has not begged him or tried to ask him what she has done.  She admits that she has gained some weight due to emotional eating.  She continues to have problems with mobility and she is using a walker in session.  She has been frustrated with her male partner.  She found out that he had been talking to another lady.  She says that he is a "slob."  She notes that his clothes are dirty and he does not clean in the house.  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  She notes that part of the job is to look clean and presentable.  He continues to work part time at [employer].  She feels that she needs a higher dose of medication to cope with the stress of the relationship.  She is encouraged to talk to Dr. Hernandez.  She does not feel that she can leave the relationship due to finances.  She does not have any family or friends that could help her.  She has been talking to her partner's sister.  His sister has noted that other women have complained about some of the same issues with her brother in the past.    Target symptoms: depression  Why chosen therapy is appropriate versus another modality: relevant to diagnosis  Outcome monitoring methods: self-report, observation  Therapeutic intervention " type: insight oriented psychotherapy, supportive psychotherapy, interactive psychotherapy     Risk parameters:  Patient reports no suicidal ideation  Patient reports no homicidal ideation  Patient reports no self-injurious behavior  Patient reports no violent behavior     Verbal deficits: None     Patient's response to intervention:  The patient's response to intervention is accepting, motivated.     Progress toward goals and other mental status changes:  The patient's progress toward goals is fair .     Diagnosis:   Major depression recurrent moderate     Plan:  individual psychotherapy and medication management by physician     Return to clinic: as scheduled     Length of Service (minutes): 45

## 2018-12-26 ENCOUNTER — TELEPHONE (OUTPATIENT)
Dept: INTERNAL MEDICINE | Facility: CLINIC | Age: 58
End: 2018-12-26

## 2018-12-26 DIAGNOSIS — K21.9 GASTROESOPHAGEAL REFLUX DISEASE, ESOPHAGITIS PRESENCE NOT SPECIFIED: ICD-10-CM

## 2018-12-26 DIAGNOSIS — K31.84 GASTROPARESIS: Primary | ICD-10-CM

## 2018-12-26 NOTE — TELEPHONE ENCOUNTER
Patient requesting a referral to GI.  Patient was admitted to [facility] in [facility] for gastroparesis. Patient states she will come by and sign a release to send for records. Hospital provider recommended patient to see GI specialist.  Patient prefer a GI doctor associated with [facility].

## 2018-12-26 NOTE — TELEPHONE ENCOUNTER
There is no specific [facility] GI group.  I believe the GI doctors from [facility] now provide services at the [facility].  I will place referral to them.

## 2018-12-28 RX ORDER — NAPROXEN 500 MG/1
500 TABLET ORAL 2 TIMES DAILY WITH MEALS
Qty: 180 TABLET | Refills: 0 | Status: SHIPPED | OUTPATIENT
Start: 2018-12-28 | End: 2019-01-29

## 2018-12-31 DIAGNOSIS — I10 HYPERTENSION, ESSENTIAL: ICD-10-CM

## 2018-12-31 RX ORDER — BENAZEPRIL HYDROCHLORIDE 20 MG/1
TABLET ORAL
Qty: 90 TABLET | Refills: 0 | Status: SHIPPED | OUTPATIENT
Start: 2018-12-31 | End: 2019-05-28 | Stop reason: SDUPTHER

## 2019-01-14 DIAGNOSIS — R32 URINARY INCONTINENCE, UNSPECIFIED TYPE: ICD-10-CM

## 2019-01-14 DIAGNOSIS — N39.0 RECURRENT UTI: ICD-10-CM

## 2019-01-14 RX ORDER — TOLTERODINE 4 MG/1
CAPSULE, EXTENDED RELEASE ORAL
Qty: 90 CAPSULE | Refills: 0 | Status: SHIPPED | OUTPATIENT
Start: 2019-01-14 | End: 2019-04-17 | Stop reason: SDUPTHER

## 2019-01-14 NOTE — TELEPHONE ENCOUNTER
----- Message from Violetta Seo sent at 1/14/2019 11:59 AM CST -----  Contact: pt  The pt states she needs a refill on her bladder spasms medication, can be reached at can be reached at [phone]///[email or web address]

## 2019-01-22 RX ORDER — DULOXETIN HYDROCHLORIDE 30 MG/1
CAPSULE, DELAYED RELEASE ORAL
Qty: 60 CAPSULE | Refills: 0 | Status: SHIPPED | OUTPATIENT
Start: 2019-01-22 | End: 2019-03-04 | Stop reason: SDUPTHER

## 2019-01-28 ENCOUNTER — PATIENT OUTREACH (OUTPATIENT)
Dept: ADMINISTRATIVE | Facility: HOSPITAL | Age: 59
End: 2019-01-28

## 2019-01-28 NOTE — LETTER
January 28, 2019        Rivka Rosasden  [id number]  [facility] [address]      Dear Ms. Adrien,    You have an upcoming appointment with Kennedy Singh MD on 02/11/19.      Your chart is indicating you may be due for the following and I will be happy to assist you in scheduling any needed appointments:  Health Maintenance Due   Topic    Colonoscopy     Lipid Panel     Mammogram           If you have had any of the above done at another facility, please bring the records or information with you so that your record at [facility] will be complete.    We will be happy to assist you with scheduling any necessary appointments or you may contact the [facility] appointment desk at [phone] to schedule at your convenience.     Thank you for choosing [facility] for your healthcare needs,    Cheryl SAL LPN Care Coordinator  [facility]  [phone]

## 2019-01-29 DIAGNOSIS — G56.01 CARPAL TUNNEL SYNDROME OF RIGHT WRIST: ICD-10-CM

## 2019-01-29 DIAGNOSIS — F41.9 ANXIETY: ICD-10-CM

## 2019-01-29 RX ORDER — CLONAZEPAM 1 MG/1
TABLET ORAL
Qty: 90 TABLET | Refills: 0 | Status: SHIPPED | OUTPATIENT
Start: 2019-01-29 | End: 2019-04-10 | Stop reason: SDUPTHER

## 2019-01-29 RX ORDER — SULFACETAMIDE SODIUM 100 MG/ML
1 LOTION TOPICAL DAILY
COMMUNITY
Start: 2019-01-23 | End: 2019-02-25 | Stop reason: SDUPTHER

## 2019-01-29 RX ORDER — PREGABALIN 75 MG/1
75 CAPSULE ORAL 2 TIMES DAILY
Qty: 180 CAPSULE | Refills: 3 | Status: SHIPPED | OUTPATIENT
Start: 2019-01-29 | End: 2020-06-30

## 2019-01-29 RX ORDER — MELOXICAM 15 MG/1
15 TABLET ORAL DAILY
COMMUNITY
Start: 2019-01-10 | End: 2019-03-21 | Stop reason: ALTCHOICE

## 2019-01-29 RX ORDER — NITROFURANTOIN 25; 75 MG/1; MG/1
100 CAPSULE ORAL NIGHTLY
Qty: 90 CAPSULE | Refills: 3 | Status: SHIPPED | OUTPATIENT
Start: 2019-01-29 | End: 2020-04-13

## 2019-02-11 ENCOUNTER — OFFICE VISIT (OUTPATIENT)
Dept: INTERNAL MEDICINE | Facility: CLINIC | Age: 59
End: 2019-02-11
Payer: MEDICARE

## 2019-02-11 ENCOUNTER — LAB VISIT (OUTPATIENT)
Dept: LAB | Facility: HOSPITAL | Age: 59
End: 2019-02-11
Attending: FAMILY MEDICINE
Payer: MEDICARE

## 2019-02-11 VITALS
SYSTOLIC BLOOD PRESSURE: 134 MMHG | TEMPERATURE: 98 F | HEART RATE: 93 BPM | OXYGEN SATURATION: 99 % | BODY MASS INDEX: 47.09 KG/M2 | WEIGHT: 293 LBS | HEIGHT: 66 IN | DIASTOLIC BLOOD PRESSURE: 88 MMHG

## 2019-02-11 DIAGNOSIS — Z12.39 SCREENING FOR BREAST CANCER: ICD-10-CM

## 2019-02-11 DIAGNOSIS — R35.0 FREQUENT URINATION: ICD-10-CM

## 2019-02-11 DIAGNOSIS — R79.9 ABNORMAL FINDING OF BLOOD CHEMISTRY: ICD-10-CM

## 2019-02-11 DIAGNOSIS — G57.91 NEUROPATHY OF RIGHT FOOT: ICD-10-CM

## 2019-02-11 DIAGNOSIS — A60.00 GENITAL HERPES SIMPLEX, UNSPECIFIED SITE: ICD-10-CM

## 2019-02-11 DIAGNOSIS — N39.0 RECURRENT UTI: ICD-10-CM

## 2019-02-11 DIAGNOSIS — F33.1 MAJOR DEPRESSIVE DISORDER, RECURRENT EPISODE, MODERATE: ICD-10-CM

## 2019-02-11 DIAGNOSIS — R32 URINARY INCONTINENCE, UNSPECIFIED TYPE: ICD-10-CM

## 2019-02-11 DIAGNOSIS — B37.2 CANDIDAL DERMATITIS: ICD-10-CM

## 2019-02-11 DIAGNOSIS — E66.01 MORBID OBESITY WITH BMI OF 45.0-49.9, ADULT: ICD-10-CM

## 2019-02-11 DIAGNOSIS — Z13.6 ENCOUNTER FOR SCREENING FOR CARDIOVASCULAR DISORDERS: ICD-10-CM

## 2019-02-11 DIAGNOSIS — I10 HYPERTENSION, ESSENTIAL: Chronic | ICD-10-CM

## 2019-02-11 DIAGNOSIS — M21.371 RIGHT FOOT DROP: ICD-10-CM

## 2019-02-11 DIAGNOSIS — Z91.81 RISK FOR FALLS: ICD-10-CM

## 2019-02-11 DIAGNOSIS — F41.9 ANXIETY: Chronic | ICD-10-CM

## 2019-02-11 DIAGNOSIS — R29.898 WEAKNESS OF RIGHT LOWER EXTREMITY: ICD-10-CM

## 2019-02-11 DIAGNOSIS — K59.09 CHRONIC CONSTIPATION: Primary | ICD-10-CM

## 2019-02-11 LAB
CHOLEST SERPL-MCNC: 179 MG/DL
CHOLEST/HDLC SERPL: 4 {RATIO}
HDLC SERPL-MCNC: 45 MG/DL
HDLC SERPL: 25.1 %
LDLC SERPL CALC-MCNC: 113.8 MG/DL
NONHDLC SERPL-MCNC: 134 MG/DL
TRIGL SERPL-MCNC: 101 MG/DL

## 2019-02-11 PROCEDURE — 3079F PR MOST RECENT DIASTOLIC BLOOD PRESSURE 80-89 MM HG: ICD-10-PCS | Mod: CPTII,S$GLB,, | Performed by: FAMILY MEDICINE

## 2019-02-11 PROCEDURE — 99215 PR OFFICE/OUTPT VISIT, EST, LEVL V, 40-54 MIN: ICD-10-PCS | Mod: S$GLB,,, | Performed by: FAMILY MEDICINE

## 2019-02-11 PROCEDURE — 80061 LIPID PANEL: CPT

## 2019-02-11 PROCEDURE — 3075F PR MOST RECENT SYSTOLIC BLOOD PRESS GE 130-139MM HG: ICD-10-PCS | Mod: CPTII,S$GLB,, | Performed by: FAMILY MEDICINE

## 2019-02-11 PROCEDURE — 99999 PR PBB SHADOW E&M-EST. PATIENT-LVL III: ICD-10-PCS | Mod: PBBFAC,,, | Performed by: FAMILY MEDICINE

## 2019-02-11 PROCEDURE — 99999 PR PBB SHADOW E&M-EST. PATIENT-LVL III: CPT | Mod: PBBFAC,,, | Performed by: FAMILY MEDICINE

## 2019-02-11 PROCEDURE — 99215 OFFICE O/P EST HI 40 MIN: CPT | Mod: S$GLB,,, | Performed by: FAMILY MEDICINE

## 2019-02-11 PROCEDURE — 3079F DIAST BP 80-89 MM HG: CPT | Mod: CPTII,S$GLB,, | Performed by: FAMILY MEDICINE

## 2019-02-11 PROCEDURE — 3075F SYST BP GE 130 - 139MM HG: CPT | Mod: CPTII,S$GLB,, | Performed by: FAMILY MEDICINE

## 2019-02-11 PROCEDURE — 99499 RISK ADDL DX/OHS AUDIT: ICD-10-PCS | Mod: S$GLB,,, | Performed by: FAMILY MEDICINE

## 2019-02-11 PROCEDURE — 3008F PR BODY MASS INDEX (BMI) DOCUMENTED: ICD-10-PCS | Mod: CPTII,S$GLB,, | Performed by: FAMILY MEDICINE

## 2019-02-11 PROCEDURE — 83036 HEMOGLOBIN GLYCOSYLATED A1C: CPT

## 2019-02-11 PROCEDURE — 99499 UNLISTED E&M SERVICE: CPT | Mod: S$GLB,,, | Performed by: FAMILY MEDICINE

## 2019-02-11 PROCEDURE — 36415 COLL VENOUS BLD VENIPUNCTURE: CPT

## 2019-02-11 PROCEDURE — 3008F BODY MASS INDEX DOCD: CPT | Mod: CPTII,S$GLB,, | Performed by: FAMILY MEDICINE

## 2019-02-11 NOTE — PROGRESS NOTES
Subjective:   Patient ID: Rivka Jurado is a 58 y.o. female.  Chief Complaint:  Follow-up      Patient presents for 6 month follow-up multiple chronic medical conditions.    Main acute concern today is increased constipation despite stool softener, MiraLax, additional over-the-counter treatments.  Medical history for:   Rhinitis.  Stable Flonase and Singulair.   Hypertension.  Stable Lotensin 20 mg daily.   with recurrent urinary tract infection and bladder incontinence.  Stable Detrol LA, Macrobid, and Diflucan.    Neurologic with persistent foot drop, weakness, increased falls.    Physical therapy 3 limitations based on size and pain.    Psychiatry on Cymbalta and Klonopin.  Due for lipid panel, mammogram,  Colon cancer screening.    Previous elevated serum glucose.  Needs A1c.      Review of Systems   Constitutional: Positive for fatigue. Negative for appetite change, chills, diaphoresis and fever.   HENT: Negative for congestion, ear discharge, ear pain, postnasal drip, rhinorrhea, sinus pressure, sore throat and trouble swallowing.    Respiratory: Negative for cough, chest tightness and shortness of breath.    Cardiovascular: Negative for chest pain, palpitations and leg swelling.   Gastrointestinal: Positive for abdominal distention, abdominal pain and constipation. Negative for anal bleeding, blood in stool, diarrhea, nausea, rectal pain and vomiting.   Genitourinary: Negative for difficulty urinating, dysuria, flank pain, frequency and hematuria.   Musculoskeletal: Positive for arthralgias, back pain, gait problem and myalgias.   Skin: Negative for rash and wound.   Allergic/Immunologic: Negative for immunocompromised state.   Neurological: Positive for weakness and headaches. Negative for dizziness, tremors, seizures, syncope, speech difficulty, light-headedness and numbness.   Hematological: Does not bruise/bleed easily.   Psychiatric/Behavioral: Positive for dysphoric mood and sleep disturbance.  "Negative for agitation, behavioral problems, confusion, decreased concentration, hallucinations, self-injury and suicidal ideas. The patient is not nervous/anxious and is not hyperactive.      Objective:   /88 (BP Location: Right arm, Patient Position: Sitting, BP Method: Large (Manual))   Pulse 93   Temp 98.2 °F (36.8 °C) (Tympanic)   Ht 5' 6" (1.676 m)   Wt 135.1 kg (297 lb 13.5 oz)   SpO2 99%   BMI 48.07 kg/m²     Physical Exam   Constitutional: Vital signs are normal. She appears well-developed and well-nourished.  Non-toxic appearance. She does not have a sickly appearance. She does not appear ill. No distress.    BP controlled.  Morbid obesity.  Weight increase since last visit.   HENT:   Mouth/Throat: Uvula is midline, oropharynx is clear and moist and mucous membranes are normal.   Eyes: No scleral icterus.   Neck: Normal range of motion. Neck supple. No JVD present. No thyroid mass and no thyromegaly present.   Cardiovascular: Normal rate, regular rhythm and normal heart sounds. Exam reveals no gallop and no friction rub.   No murmur heard.  Pulmonary/Chest: Effort normal and breath sounds normal. No respiratory distress. She has no wheezes. She has no rhonchi. She has no rales.   Abdominal: Soft. Normal appearance. She exhibits no distension, no fluid wave, no ascites and no mass. Bowel sounds are increased. There is no hepatosplenomegaly. There is no tenderness. There is no rigidity, no rebound, no guarding and no CVA tenderness.   Musculoskeletal: She exhibits no edema.   Lymphadenopathy:     She has no cervical adenopathy.        Right: No inguinal adenopathy present.        Left: No inguinal adenopathy present.   Neurological: She displays atrophy. She exhibits abnormal muscle tone. She displays a negative Romberg sign. Coordination and gait abnormal.     Right foot drop   Skin: Skin is warm, dry and intact. No rash noted.   Psychiatric: She has a normal mood and affect. Her behavior is " normal. Judgment and thought content normal. Cognition and memory are normal.   Nursing note and vitals reviewed.    Assessment:       ICD-10-CM ICD-9-CM   1. Chronic constipation K59.09 564.00   2. Hypertension, essential I10 401.9   3. Major depressive disorder, recurrent episode, moderate F33.1 296.32   4. Anxiety F41.9 300.00   5. Right foot drop M21.371 736.79   6. Weakness of right lower extremity R29.898 729.89   7. Neuropathy of right foot G57.91 355.8   8. Risk for falls Z91.81 V15.88   9. Urinary incontinence, unspecified type R32 788.30   10. Candidal dermatitis B37.2 112.3   11. Recurrent UTI N39.0 599.0   12. Frequent urination R35.0 788.41   13. Genital herpes simplex, unspecified site A60.00 054.10   14. Morbid obesity with BMI of 45.0-49.9, adult E66.01 278.01    Z68.42 V85.42   15. Screening for breast cancer Z12.31 V76.10   16. Encounter for screening for cardiovascular disorders Z13.6 V81.2   17. Abnormal finding of blood chemistry  R79.9 790.6     Plan:   Chronic constipation  -     linaclotide (LINZESS) 72 mcg Cap; Take 1 capsule (72 mcg total) by mouth Daily.  Dispense: 90 capsule; Refill: 0  Trial of Linzess     Hypertension, essential  -     Hemoglobin A1c; Future; Expected date: 02/11/2019  Controlled.  BP at goal.  Continue Lotensin 20 mg daily     Major depressive disorder, recurrent episode, moderate  Anxiety  Clinically stable   Continue per psychiatry     Right foot drop  Weakness of right lower extremity  Neuropathy of right foot  Risk for falls  Continue present medications  Reviewed physical therapy when patient agreeable     Urinary incontinence, unspecified type  Candidal dermatitis  Recurrent UTI  Frequent urination  -     Hemoglobin A1c; Future; Expected date: 02/11/2019  Continue Diflucan, Macrobid, Detrol LA  Check A1c to rule out diabetes.    Treat as indicated.      Genital herpes simplex, unspecified site  Stable without any significant outbreaks on Valtrex     Morbid  obesity with BMI of 45.0-49.9, adult  Abnormal finding of blood chemistry   -     Hemoglobin A1c; Future; Expected date: 02/11/2019  Discussed patient's increased BMI, increased health risks, and need for weight loss. The patient's BMI has been recorded in the chart. The patient has been provided educational materials regarding the benefits of attaining and maintaining a normal weight. Lifestyle modifications to promote weight loss like decreased caloric intake and increased physical activity are recommended. We will continue to address and follow this issue during follow up visits.Unfortunately patient is significantly limited by pain in overall decreased mobility.      RHM  -     Mammo Digital Screening Bilateral With CAD; Future; Expected date: 02/11/2019  -     Lipid panel; Future; Expected date: 02/11/2019      Return to clinic 6 months or sooner as needed

## 2019-02-12 LAB
ESTIMATED AVG GLUCOSE: 114 MG/DL
HBA1C MFR BLD HPLC: 5.6 %

## 2019-02-19 DIAGNOSIS — A60.00 GENITAL HERPES SIMPLEX, UNSPECIFIED SITE: ICD-10-CM

## 2019-02-19 DIAGNOSIS — B37.2 CANDIDAL DERMATITIS: ICD-10-CM

## 2019-02-19 DIAGNOSIS — K59.09 CHRONIC CONSTIPATION: ICD-10-CM

## 2019-02-19 RX ORDER — VALACYCLOVIR HYDROCHLORIDE 1 G/1
1000 TABLET, FILM COATED ORAL DAILY
Qty: 90 TABLET | Refills: 3 | Status: SHIPPED | OUTPATIENT
Start: 2019-02-19 | End: 2020-04-13

## 2019-02-19 RX ORDER — NYSTATIN AND TRIAMCINOLONE ACETONIDE 100000; 1 [USP'U]/G; MG/G
CREAM TOPICAL 2 TIMES DAILY
Qty: 60 G | Refills: 3 | Status: SHIPPED | OUTPATIENT
Start: 2019-02-19 | End: 2019-10-10 | Stop reason: SDUPTHER

## 2019-02-19 RX ORDER — FLUCONAZOLE 200 MG/1
200 TABLET ORAL
Qty: 24 TABLET | Refills: 0 | Status: SHIPPED | OUTPATIENT
Start: 2019-02-21 | End: 2020-03-02 | Stop reason: SDUPTHER

## 2019-02-19 RX ORDER — TIZANIDINE 4 MG/1
8 TABLET ORAL NIGHTLY
Qty: 180 TABLET | Refills: 3 | Status: SHIPPED | OUTPATIENT
Start: 2019-02-19 | End: 2020-04-13

## 2019-02-19 RX ORDER — FLUTICASONE PROPIONATE 50 MCG
2 SPRAY, SUSPENSION (ML) NASAL DAILY
Qty: 3 BOTTLE | Refills: 3 | Status: SHIPPED | OUTPATIENT
Start: 2019-02-19 | End: 2020-02-17

## 2019-02-27 RX ORDER — SULFACETAMIDE SODIUM 100 MG/ML
1 LOTION TOPICAL DAILY
Qty: 118 ML | Refills: 5 | Status: SHIPPED | OUTPATIENT
Start: 2019-02-27 | End: 2020-02-17

## 2019-03-01 DIAGNOSIS — F41.9 ANXIETY: ICD-10-CM

## 2019-03-01 RX ORDER — CLONAZEPAM 1 MG/1
TABLET ORAL
Qty: 90 TABLET | Refills: 0 | OUTPATIENT
Start: 2019-03-01

## 2019-03-01 RX ORDER — CETIRIZINE HYDROCHLORIDE 10 MG/1
10 TABLET ORAL DAILY
Qty: 90 TABLET | Refills: 3 | Status: SHIPPED | OUTPATIENT
Start: 2019-03-01 | End: 2019-10-10

## 2019-03-01 RX ORDER — DULOXETIN HYDROCHLORIDE 30 MG/1
CAPSULE, DELAYED RELEASE ORAL
Qty: 60 CAPSULE | Refills: 0 | OUTPATIENT
Start: 2019-03-01

## 2019-03-05 RX ORDER — DULOXETIN HYDROCHLORIDE 30 MG/1
CAPSULE, DELAYED RELEASE ORAL
Qty: 60 CAPSULE | Refills: 0 | Status: SHIPPED | OUTPATIENT
Start: 2019-03-05 | End: 2019-04-02 | Stop reason: SDUPTHER

## 2019-03-07 RX ORDER — NYSTATIN 100000 [USP'U]/G
POWDER TOPICAL
Qty: 60 G | Refills: 0 | Status: SHIPPED | OUTPATIENT
Start: 2019-03-07 | End: 2019-04-09 | Stop reason: SDUPTHER

## 2019-03-19 ENCOUNTER — TELEPHONE (OUTPATIENT)
Dept: INTERNAL MEDICINE | Facility: CLINIC | Age: 59
End: 2019-03-19

## 2019-03-19 NOTE — TELEPHONE ENCOUNTER
----- Message from Bell Esparza sent at 3/19/2019  2:08 PM CDT -----  Contact: Patient  Type:  Needs Medical Advice    Who Called:  Rivka  Symptoms (please be specific):  Hosp F/U ([facility]) Pancreatitis - still having issues - can't eat - can only drink Sprite. She is still very sick and needs to know what to do .     How long has patient had these symptoms:  5 days  Pharmacy name and phone #:  n/a  Would the patient rather a call back or a response via [facility]? Call back   Best Call Back Number:  [phone]  Additional Information: n/a    Thanks,  Bell

## 2019-03-21 ENCOUNTER — OFFICE VISIT (OUTPATIENT)
Dept: INTERNAL MEDICINE | Facility: CLINIC | Age: 59
End: 2019-03-21
Payer: MEDICARE

## 2019-03-21 VITALS
TEMPERATURE: 100 F | SYSTOLIC BLOOD PRESSURE: 130 MMHG | OXYGEN SATURATION: 98 % | HEART RATE: 85 BPM | BODY MASS INDEX: 46.76 KG/M2 | DIASTOLIC BLOOD PRESSURE: 84 MMHG | WEIGHT: 289.69 LBS

## 2019-03-21 DIAGNOSIS — Z09 HOSPITAL DISCHARGE FOLLOW-UP: Primary | ICD-10-CM

## 2019-03-21 PROCEDURE — 3075F PR MOST RECENT SYSTOLIC BLOOD PRESS GE 130-139MM HG: ICD-10-PCS | Mod: CPTII,S$GLB,, | Performed by: FAMILY MEDICINE

## 2019-03-21 PROCEDURE — 99999 PR PBB SHADOW E&M-EST. PATIENT-LVL III: ICD-10-PCS | Mod: PBBFAC,,, | Performed by: FAMILY MEDICINE

## 2019-03-21 PROCEDURE — 99999 PR PBB SHADOW E&M-EST. PATIENT-LVL III: CPT | Mod: PBBFAC,,, | Performed by: FAMILY MEDICINE

## 2019-03-21 PROCEDURE — 3075F SYST BP GE 130 - 139MM HG: CPT | Mod: CPTII,S$GLB,, | Performed by: FAMILY MEDICINE

## 2019-03-21 PROCEDURE — 99213 OFFICE O/P EST LOW 20 MIN: CPT | Mod: S$GLB,,, | Performed by: FAMILY MEDICINE

## 2019-03-21 PROCEDURE — 3079F DIAST BP 80-89 MM HG: CPT | Mod: CPTII,S$GLB,, | Performed by: FAMILY MEDICINE

## 2019-03-21 PROCEDURE — 3008F PR BODY MASS INDEX (BMI) DOCUMENTED: ICD-10-PCS | Mod: CPTII,S$GLB,, | Performed by: FAMILY MEDICINE

## 2019-03-21 PROCEDURE — 99213 PR OFFICE/OUTPT VISIT, EST, LEVL III, 20-29 MIN: ICD-10-PCS | Mod: S$GLB,,, | Performed by: FAMILY MEDICINE

## 2019-03-21 PROCEDURE — 3008F BODY MASS INDEX DOCD: CPT | Mod: CPTII,S$GLB,, | Performed by: FAMILY MEDICINE

## 2019-03-21 PROCEDURE — 3079F PR MOST RECENT DIASTOLIC BLOOD PRESSURE 80-89 MM HG: ICD-10-PCS | Mod: CPTII,S$GLB,, | Performed by: FAMILY MEDICINE

## 2019-03-21 NOTE — PROGRESS NOTES
Subjective:   Patient ID: Rivka Jurado is a 58 y.o. female.  Chief Complaint:  Follow-up (d/c [facility] ED 3/18/19)      Patient presents for hospital discharge follow-up.    Reports admitted to the [facility] with episode fever, epigastric / upper abdominal pain, nausea without vomiting.  No old records available.    Told pancreatitis.  Treated with IV fluids, NPO, and pain medications with gradual improvement discharge home.  States 3rd time she has had an acute episode.  Previously referred to [facility] associates, but due to weather unable to keep appointment.  Still reports significant difficulty eating and keeping food down at home.  They have her discontinue her Naprosyn, and her  Musculoskeletal pains have significantly worsened.  Questions what else she can do.    Request referral to GI doctor at [facility].        Review of Systems   Constitutional: Positive for fatigue. Negative for appetite change, chills, diaphoresis and fever.   HENT: Negative for congestion, ear discharge, ear pain, postnasal drip, rhinorrhea, sinus pressure, sore throat and trouble swallowing.    Respiratory: Negative for cough, chest tightness and shortness of breath.    Cardiovascular: Positive for leg swelling (Right). Negative for chest pain and palpitations.   Gastrointestinal: Positive for abdominal distention and abdominal pain. Negative for anal bleeding, blood in stool, constipation, diarrhea, nausea, rectal pain and vomiting.   Genitourinary: Negative for difficulty urinating, dysuria, flank pain, frequency and hematuria.   Musculoskeletal: Positive for arthralgias, back pain, gait problem and myalgias.   Skin: Negative for rash and wound.   Allergic/Immunologic: Negative for immunocompromised state.   Neurological: Positive for weakness and headaches. Negative for dizziness, tremors, seizures, syncope, speech difficulty, light-headedness and numbness.   Hematological: Does not bruise/bleed easily.   Psychiatric/Behavioral:  Positive for dysphoric mood and sleep disturbance. Negative for agitation, behavioral problems, confusion, decreased concentration, hallucinations, self-injury and suicidal ideas. The patient is not nervous/anxious and is not hyperactive.      Objective:   /84 (BP Location: Right arm, Patient Position: Sitting, BP Method: Large (Manual))   Pulse 85   Temp 99.7 °F (37.6 °C) (Tympanic)   Wt 131.4 kg (289 lb 11 oz)   SpO2 98%   BMI 46.76 kg/m²     Physical Exam   Constitutional: Vital signs are normal. She appears well-developed and well-nourished.   Cardiovascular: Normal rate and regular rhythm.   Pulmonary/Chest: Effort normal. No respiratory distress.   Musculoskeletal: She exhibits edema (1 to 2+ right greater than left). She exhibits no tenderness.   Skin: Skin is warm and dry. No rash noted.   Psychiatric: She has a normal mood and affect. Her behavior is normal. Judgment and thought content normal.   Nursing note and vitals reviewed.    Assessment:       ICD-10-CM ICD-9-CM   1. Hospital discharge follow-up Z09 V67.59   2. Recurrent pancreatitis K86.1 577.1     Plan:   Hospital discharge follow-up    Recurrent pancreatitis  -     lipase-protease-amylase 24,000-76,000-120,000 units (PANLIPASE) 24,000-76,000 -120,000 unit capsule; Take 1-2 capsules by mouth 3 (three) times daily with meals.  Dispense: 180 capsule; Refill: 0    Requested [facility] from [facility].    Referral to GI once records are available.   Patient education/handout on pancreatitis.   Add Creon 24,000 capsule 1-2 with meals 3 times a  day to see if helps with overall appetite and symptoms.    Continue Nexium 40 mg at least once a day.    Discussed if truly pancreatitis as cause of pain, okay to restart NSAIDs for her chronic musculoskeletal conditions.  Patient expresses understanding and agreement to the above plan.

## 2019-03-21 NOTE — PATIENT INSTRUCTIONS
Discharge Instructions for Chronic Pancreatitis  You have been diagnosed with long-term (chronic) pancreatitis. This is caused by repeated cases of inflammation of your pancreas. It results in permanent scarring of the pancreatic tissue. The pancreas is an organ that makes chemicals and hormones that help you digest food and use sugar for energy. Some causes of chronic pancreatitis are the continued use of alcohol and tobacco, genetic disorders, and structural problems in the pancreas. Here's what you can do at home to help with your condition.  Home care  Suggestions for home care include the following:   · Ask someone to drive you to appointments until you know how the illness has affected you.  · Tell your healthcare provider about any medicines you are taking.  · Take your medicines exactly as directed. Dont skip doses.  · Ask your healthcare provider about over-the-counter pain medicines, if needed.  · Learn to monitor your blood sugar. Keep a record of your readings. Work with your healthcare provider to control blood sugar levels.  · Learn to take your own pulse. Keep a record of your results. Ask your healthcare provider which readings mean that you need medical attention.  · Watch for symptoms that your pancreatitis is getting worse. These symptoms include abdominal pain, nausea and vomiting, diarrhea or oil in your stool, weight loss, and fever.  Diet changes  Suggestions for dietary changes include the following:  · Eat a low-fat diet. Ask your healthcare provider for menus and other diet information.  · Take vitamins A, D, and E, and add calcium to your diet.  · Your healthcare provider may recommend digestive enzymes to take with each meal and snack.   · Stop drinking, especially if your illness was caused by alcohol.  ¨ Ask your healthcare provider about alcohol abuse programs and support groups such as Alcoholics Anonymous.  ¨ Ask your healthcare provider about prescription medicines that can help  you stop drinking.  Follow-up care  Make a follow-up appointment as directed by our staff.  When to call your healthcare provider  Call your healthcare provider right away if you have any of the following:  · Fever above 100°F (37.7°C)  · Severe pain in your upper abdomen to your back  · Nausea and vomiting  · Abdominal swelling and tenderness  · Loss of weight without dieting   Date Last Reviewed: 7/1/2016  © 5781-8681 [employer]. [address] [place] [address], [place] [id number]. All rights reserved. This information is not intended as a substitute for professional medical care. Always follow your healthcare professional's instructions.

## 2019-03-29 DIAGNOSIS — F41.9 ANXIETY: ICD-10-CM

## 2019-03-29 RX ORDER — CLONAZEPAM 1 MG/1
TABLET ORAL
Qty: 90 TABLET | Refills: 0 | OUTPATIENT
Start: 2019-03-29

## 2019-04-01 RX ORDER — NAPROXEN 500 MG/1
500 TABLET ORAL 2 TIMES DAILY WITH MEALS
Qty: 180 TABLET | Refills: 0 | Status: SHIPPED | OUTPATIENT
Start: 2019-04-01 | End: 2019-07-08

## 2019-04-02 ENCOUNTER — OFFICE VISIT (OUTPATIENT)
Dept: GASTROENTEROLOGY | Facility: CLINIC | Age: 59
End: 2019-04-02
Payer: MEDICARE

## 2019-04-02 ENCOUNTER — LAB VISIT (OUTPATIENT)
Dept: LAB | Facility: HOSPITAL | Age: 59
End: 2019-04-02
Attending: INTERNAL MEDICINE
Payer: MEDICARE

## 2019-04-02 VITALS
HEART RATE: 74 BPM | WEIGHT: 284.19 LBS | BODY MASS INDEX: 40.69 KG/M2 | DIASTOLIC BLOOD PRESSURE: 82 MMHG | HEIGHT: 70 IN | SYSTOLIC BLOOD PRESSURE: 126 MMHG

## 2019-04-02 DIAGNOSIS — Q44.4 CHOLEDOCHAL CYST: ICD-10-CM

## 2019-04-02 DIAGNOSIS — R11.0 NAUSEA: ICD-10-CM

## 2019-04-02 DIAGNOSIS — R10.13 EPIGASTRIC PAIN: ICD-10-CM

## 2019-04-02 LAB
ALBUMIN SERPL BCP-MCNC: 3.5 G/DL (ref 3.5–5.2)
ALP SERPL-CCNC: 105 U/L (ref 55–135)
ALT SERPL W/O P-5'-P-CCNC: 11 U/L (ref 10–44)
AMYLASE SERPL-CCNC: 22 U/L (ref 20–110)
ANION GAP SERPL CALC-SCNC: 8 MMOL/L (ref 8–16)
AST SERPL-CCNC: 15 U/L (ref 10–40)
BASOPHILS # BLD AUTO: 0.11 K/UL (ref 0–0.2)
BASOPHILS NFR BLD: 1.5 % (ref 0–1.9)
BILIRUB DIRECT SERPL-MCNC: 0.2 MG/DL (ref 0.1–0.3)
BILIRUB SERPL-MCNC: 0.4 MG/DL (ref 0.1–1)
BUN SERPL-MCNC: 12 MG/DL (ref 6–20)
CALCIUM SERPL-MCNC: 10 MG/DL (ref 8.7–10.5)
CHLORIDE SERPL-SCNC: 103 MMOL/L (ref 95–110)
CO2 SERPL-SCNC: 29 MMOL/L (ref 23–29)
CREAT SERPL-MCNC: 1.1 MG/DL (ref 0.5–1.4)
CRP SERPL-MCNC: 4.4 MG/L (ref 0–8.2)
DIFFERENTIAL METHOD: ABNORMAL
EOSINOPHIL # BLD AUTO: 0.6 K/UL (ref 0–0.5)
EOSINOPHIL NFR BLD: 8.1 % (ref 0–8)
ERYTHROCYTE [DISTWIDTH] IN BLOOD BY AUTOMATED COUNT: 14 % (ref 11.5–14.5)
EST. GFR  (AFRICAN AMERICAN): >60 ML/MIN/1.73 M^2
EST. GFR  (NON AFRICAN AMERICAN): 55.5 ML/MIN/1.73 M^2
GLUCOSE SERPL-MCNC: 103 MG/DL (ref 70–110)
HCT VFR BLD AUTO: 42 % (ref 37–48.5)
HGB BLD-MCNC: 12.8 G/DL (ref 12–16)
IMM GRANULOCYTES # BLD AUTO: 0.02 K/UL (ref 0–0.04)
IMM GRANULOCYTES NFR BLD AUTO: 0.3 % (ref 0–0.5)
LIPASE SERPL-CCNC: 13 U/L (ref 4–60)
LYMPHOCYTES # BLD AUTO: 2.2 K/UL (ref 1–4.8)
LYMPHOCYTES NFR BLD: 29.7 % (ref 18–48)
MCH RBC QN AUTO: 27.1 PG (ref 27–31)
MCHC RBC AUTO-ENTMCNC: 30.5 G/DL (ref 32–36)
MCV RBC AUTO: 89 FL (ref 82–98)
MONOCYTES # BLD AUTO: 0.6 K/UL (ref 0.3–1)
MONOCYTES NFR BLD: 7.9 % (ref 4–15)
NEUTROPHILS # BLD AUTO: 3.9 K/UL (ref 1.8–7.7)
NEUTROPHILS NFR BLD: 52.5 % (ref 38–73)
NRBC BLD-RTO: 0 /100 WBC
PLATELET # BLD AUTO: 514 K/UL (ref 150–350)
PMV BLD AUTO: 9.8 FL (ref 9.2–12.9)
POTASSIUM SERPL-SCNC: 4.5 MMOL/L (ref 3.5–5.1)
PROT SERPL-MCNC: 8.1 G/DL (ref 6–8.4)
RBC # BLD AUTO: 4.72 M/UL (ref 4–5.4)
SODIUM SERPL-SCNC: 140 MMOL/L (ref 136–145)
WBC # BLD AUTO: 7.37 K/UL (ref 3.9–12.7)

## 2019-04-02 PROCEDURE — 3074F SYST BP LT 130 MM HG: CPT | Mod: CPTII,S$GLB,, | Performed by: INTERNAL MEDICINE

## 2019-04-02 PROCEDURE — 83690 ASSAY OF LIPASE: CPT

## 2019-04-02 PROCEDURE — 80048 BASIC METABOLIC PNL TOTAL CA: CPT

## 2019-04-02 PROCEDURE — 3008F PR BODY MASS INDEX (BMI) DOCUMENTED: ICD-10-PCS | Mod: CPTII,S$GLB,, | Performed by: INTERNAL MEDICINE

## 2019-04-02 PROCEDURE — 86140 C-REACTIVE PROTEIN: CPT

## 2019-04-02 PROCEDURE — 85025 COMPLETE CBC W/AUTO DIFF WBC: CPT

## 2019-04-02 PROCEDURE — 99499 UNLISTED E&M SERVICE: CPT | Mod: S$GLB,,, | Performed by: INTERNAL MEDICINE

## 2019-04-02 PROCEDURE — 99204 PR OFFICE/OUTPT VISIT, NEW, LEVL IV, 45-59 MIN: ICD-10-PCS | Mod: S$GLB,,, | Performed by: INTERNAL MEDICINE

## 2019-04-02 PROCEDURE — 99499 RISK ADDL DX/OHS AUDIT: ICD-10-PCS | Mod: S$GLB,,, | Performed by: INTERNAL MEDICINE

## 2019-04-02 PROCEDURE — 36415 COLL VENOUS BLD VENIPUNCTURE: CPT

## 2019-04-02 PROCEDURE — 82150 ASSAY OF AMYLASE: CPT

## 2019-04-02 PROCEDURE — 99999 PR PBB SHADOW E&M-EST. PATIENT-LVL III: CPT | Mod: PBBFAC,,, | Performed by: INTERNAL MEDICINE

## 2019-04-02 PROCEDURE — 3079F DIAST BP 80-89 MM HG: CPT | Mod: CPTII,S$GLB,, | Performed by: INTERNAL MEDICINE

## 2019-04-02 PROCEDURE — 3079F PR MOST RECENT DIASTOLIC BLOOD PRESSURE 80-89 MM HG: ICD-10-PCS | Mod: CPTII,S$GLB,, | Performed by: INTERNAL MEDICINE

## 2019-04-02 PROCEDURE — 3074F PR MOST RECENT SYSTOLIC BLOOD PRESSURE < 130 MM HG: ICD-10-PCS | Mod: CPTII,S$GLB,, | Performed by: INTERNAL MEDICINE

## 2019-04-02 PROCEDURE — 3008F BODY MASS INDEX DOCD: CPT | Mod: CPTII,S$GLB,, | Performed by: INTERNAL MEDICINE

## 2019-04-02 PROCEDURE — 99204 OFFICE O/P NEW MOD 45 MIN: CPT | Mod: S$GLB,,, | Performed by: INTERNAL MEDICINE

## 2019-04-02 PROCEDURE — 80076 HEPATIC FUNCTION PANEL: CPT

## 2019-04-02 PROCEDURE — 99999 PR PBB SHADOW E&M-EST. PATIENT-LVL III: ICD-10-PCS | Mod: PBBFAC,,, | Performed by: INTERNAL MEDICINE

## 2019-04-02 RX ORDER — DULOXETIN HYDROCHLORIDE 30 MG/1
CAPSULE, DELAYED RELEASE ORAL
Qty: 60 CAPSULE | Refills: 0 | Status: SHIPPED | OUTPATIENT
Start: 2019-04-02 | End: 2019-05-22 | Stop reason: SDUPTHER

## 2019-04-02 NOTE — PROGRESS NOTES
Subjective:       Patient ID: Rivka Jurado is a 58 y.o. female.    Chief Complaint: Hospital Follow Up and Nausea    The patient is here with complaint of recurrent bouts of pancreatitis. She was 1st time she experienced this problem was in 2012; 2 years later she had another bout. During these presentations, she was never admitted to the hospital, but was treated in the ED with IV fluids and Narcotic analgesia  And discharged without follow-up recommendation or discussion of a cause. On Her most recent admission she was hospitalized for 3 days at [facility] and she was not seen by GI. She is having intermittent bouts of nausea. There is no vomiting. She has lost 14 pounds in the last 2-3 weeks. There is no change in bowel habits. There is no BRBPR or melena.     The patient has no history of alcohol use. Her recent Lipid panel was normal. She is also S/P cholecystectomy and has a history of a choledochol cyst. Her smoking history is normal and there is no FH of Pancreatic disorder.         Review of Systems   Constitutional: Positive for chills, fatigue, fever and unexpected weight change. Negative for activity change, appetite change and diaphoresis.   HENT: Positive for postnasal drip. Negative for congestion, ear discharge, ear pain, hearing loss, nosebleeds and tinnitus.    Eyes: Negative for photophobia and visual disturbance.   Respiratory: Negative for apnea, cough, choking, chest tightness, shortness of breath and wheezing.    Cardiovascular: Positive for leg swelling. Negative for chest pain and palpitations.   Gastrointestinal: Positive for abdominal pain and nausea. Negative for abdominal distention, anal bleeding, blood in stool, constipation, diarrhea, rectal pain and vomiting.   Genitourinary: Negative for difficulty urinating, dyspareunia, dysuria, flank pain, frequency, hematuria, menstrual problem, pelvic pain, urgency, vaginal bleeding and vaginal discharge.   Musculoskeletal: Positive for  back pain and gait problem. Negative for arthralgias, joint swelling, myalgias and neck stiffness.   Skin: Negative for pallor and rash.   Neurological: Negative for dizziness, tremors, seizures, syncope, speech difficulty, weakness, numbness and headaches.        Paralysis   Hematological: Negative for adenopathy.   Psychiatric/Behavioral: Negative for agitation, confusion, hallucinations, sleep disturbance and suicidal ideas.       Objective:      Physical Exam   Constitutional: She is oriented to person, place, and time. She appears well-developed and well-nourished.   Morbid obesity   HENT:   Head: Normocephalic and atraumatic.   Bilateral turbinate congestion   Eyes: Pupils are equal, round, and reactive to light. Conjunctivae and EOM are normal. Right eye exhibits no discharge. Left eye exhibits no discharge. No scleral icterus.   Neck: Normal range of motion. Neck supple. No JVD present. No thyromegaly present.   Cardiovascular: Normal rate, regular rhythm, normal heart sounds and intact distal pulses. Exam reveals no gallop and no friction rub.   No murmur heard.  Pulmonary/Chest: Effort normal and breath sounds normal. No respiratory distress. She has no wheezes. She has no rales. She exhibits no tenderness.   Abdominal: Soft. Bowel sounds are normal. She exhibits no distension and no mass. There is tenderness. There is no rebound and no guarding.   RUQ tenderness   Musculoskeletal: Normal range of motion. She exhibits no edema.   Scar from prior surgery of right knee   Lymphadenopathy:     She has no cervical adenopathy.   Neurological: She is alert and oriented to person, place, and time. She has normal reflexes. She exhibits normal muscle tone. Coordination normal.   Skin: Skin is warm and dry. No rash noted. No erythema. No pallor.   Psychiatric: She has a normal mood and affect. Her behavior is normal. Judgment and thought content normal.   Vitals reviewed.      Assessment:   Recurrent  Pancreatitis  Choledochal cyst   Epigastric Abdominal Pain  Nausea  No diagnosis found.    Plan:   EUS with possible need for ERCP  Appropriate Lab

## 2019-04-02 NOTE — LETTER
April 8, 2019      Kennedy Singh MD  [address]  [place] [address]           [facility]  [address] [place] [id number]  Phone: [phone]  Fax: [phone]          Patient: Rivka Jurado   MR Number: [id number]   YOB: 1960   Date of Visit: 4/2/2019       Dear Dr. Kennedy Singh:    Thank you for referring Rivka Jurado to me for evaluation. Attached you will find relevant portions of my assessment and plan of care.    If you have questions, please do not hesitate to call me. I look forward to following Rivka Jurado along with you.    Sincerely,    Christo Santana III, MD    Enclosure  CC:  No Recipients    If you would like to receive this communication electronically, please contact [email or web address] or [phone] to request more information on [employer] Link access.    For providers and/or their staff who would like to refer a patient to [facility], please contact us through our one-stop-shop provider referral line, [facility], at [phone].    If you feel you have received this communication in error or would no longer like to receive these types of communications, please e-mail [email or web address]

## 2019-04-08 RX ORDER — SODIUM CHLORIDE 9 MG/ML
INJECTION, SOLUTION INTRAVENOUS CONTINUOUS
Status: CANCELLED | OUTPATIENT
Start: 2019-04-08

## 2019-04-09 ENCOUNTER — HOSPITAL ENCOUNTER (OUTPATIENT)
Facility: HOSPITAL | Age: 59
Discharge: HOME | End: 2019-04-09
Attending: INTERNAL MEDICINE | Admitting: INTERNAL MEDICINE
Payer: MEDICARE

## 2019-04-09 ENCOUNTER — ANESTHESIA (OUTPATIENT)
Dept: ENDOSCOPY | Facility: HOSPITAL | Age: 59
End: 2019-04-09
Payer: MEDICARE

## 2019-04-09 ENCOUNTER — ANESTHESIA EVENT (OUTPATIENT)
Dept: ENDOSCOPY | Facility: HOSPITAL | Age: 59
End: 2019-04-09
Payer: MEDICARE

## 2019-04-09 VITALS
TEMPERATURE: 98 F | DIASTOLIC BLOOD PRESSURE: 70 MMHG | WEIGHT: 284.63 LBS | BODY MASS INDEX: 48.59 KG/M2 | HEART RATE: 71 BPM | HEIGHT: 64 IN | SYSTOLIC BLOOD PRESSURE: 108 MMHG | OXYGEN SATURATION: 96 % | RESPIRATION RATE: 22 BRPM

## 2019-04-09 DIAGNOSIS — E66.01 MORBID OBESITY WITH BMI OF 50.0-59.9, ADULT: Primary | ICD-10-CM

## 2019-04-09 PROCEDURE — 25000003 PHARM REV CODE 250: Performed by: INTERNAL MEDICINE

## 2019-04-09 PROCEDURE — 63600175 PHARM REV CODE 636 W HCPCS: Performed by: NURSE ANESTHETIST, CERTIFIED REGISTERED

## 2019-04-09 PROCEDURE — 43259 PR ENDOSCOPIC ULTRASOUND EXAM: ICD-10-PCS | Mod: ,,, | Performed by: INTERNAL MEDICINE

## 2019-04-09 PROCEDURE — 37000009 HC ANESTHESIA EA ADD 15 MINS: Performed by: INTERNAL MEDICINE

## 2019-04-09 PROCEDURE — 37000008 HC ANESTHESIA 1ST 15 MINUTES: Performed by: INTERNAL MEDICINE

## 2019-04-09 PROCEDURE — 43259 EGD US EXAM DUODENUM/JEJUNUM: CPT | Performed by: INTERNAL MEDICINE

## 2019-04-09 PROCEDURE — 43259 EGD US EXAM DUODENUM/JEJUNUM: CPT | Mod: ,,, | Performed by: INTERNAL MEDICINE

## 2019-04-09 RX ORDER — SODIUM CHLORIDE, SODIUM LACTATE, POTASSIUM CHLORIDE, CALCIUM CHLORIDE 600; 310; 30; 20 MG/100ML; MG/100ML; MG/100ML; MG/100ML
INJECTION, SOLUTION INTRAVENOUS CONTINUOUS
Status: DISCONTINUED | OUTPATIENT
Start: 2019-04-09 | End: 2019-04-09 | Stop reason: HOSPADM

## 2019-04-09 RX ORDER — PROPOFOL 10 MG/ML
INJECTION, EMULSION INTRAVENOUS
Status: DISCONTINUED | OUTPATIENT
Start: 2019-04-09 | End: 2019-04-09

## 2019-04-09 RX ORDER — LIDOCAINE HCL/PF 100 MG/5ML
SYRINGE (ML) INTRAVENOUS
Status: DISCONTINUED | OUTPATIENT
Start: 2019-04-09 | End: 2019-04-09

## 2019-04-09 RX ORDER — NYSTATIN 100000 [USP'U]/G
POWDER TOPICAL 2 TIMES DAILY
Qty: 60 G | Refills: 11 | Status: SHIPPED | OUTPATIENT
Start: 2019-04-09

## 2019-04-09 RX ORDER — SODIUM CHLORIDE 0.9 % (FLUSH) 0.9 %
10 SYRINGE (ML) INJECTION
Status: DISCONTINUED | OUTPATIENT
Start: 2019-04-09 | End: 2019-04-09 | Stop reason: HOSPADM

## 2019-04-09 RX ADMIN — PROPOFOL 40 MG: 10 INJECTION, EMULSION INTRAVENOUS at 12:04

## 2019-04-09 RX ADMIN — LIDOCAINE HYDROCHLORIDE 100 MG: 20 INJECTION, SOLUTION INTRAVENOUS at 12:04

## 2019-04-09 RX ADMIN — PROPOFOL 140 MG: 10 INJECTION, EMULSION INTRAVENOUS at 12:04

## 2019-04-09 RX ADMIN — SODIUM CHLORIDE, SODIUM LACTATE, POTASSIUM CHLORIDE, AND CALCIUM CHLORIDE: 600; 310; 30; 20 INJECTION, SOLUTION INTRAVENOUS at 11:04

## 2019-04-09 RX ADMIN — SODIUM CHLORIDE, SODIUM LACTATE, POTASSIUM CHLORIDE, AND CALCIUM CHLORIDE: 600; 310; 30; 20 INJECTION, SOLUTION INTRAVENOUS at 12:04

## 2019-04-09 NOTE — ANESTHESIA RELEASE NOTE
"Anesthesia Release from PACU Note    Patient: Rivka Jurado    Procedure(s) Performed: Procedure(s) (LRB):  ULTRASOUND, UPPER GI TRACT, ENDOSCOPIC (N/A)    Anesthesia type: MAC    Post pain: Adequate analgesia    Post assessment: no apparent anesthetic complications    Last Vitals:   Visit Vitals  BP 94/64   Pulse 78   Temp 36.4 °C (97.5 °F)   Resp 16   Ht 5' 4" (1.626 m)   Wt 129.1 kg (284 lb 9.8 oz)   SpO2 97%   Breastfeeding? No   BMI 48.85 kg/m²       Post vital signs: stable    Level of consciousness: awake    Nausea/Vomiting: no nausea/no vomiting    Complications: none    Airway Patency: patent    Respiratory: unassisted    Cardiovascular: stable and blood pressure at baseline    Hydration: euvolemic  "

## 2019-04-09 NOTE — DISCHARGE INSTRUCTIONS
Endoscopic Ultrasound (EUS)    An endoscopic ultrasound (EUS) is a test to look at the inside of your gastrointestinal (GI) tract. It's commonly used to look for cancers or growths in the esophagus, stomach, pancreas, liver, and rectum. It can help to stage cancer (see how advanced a cancer is). EUS may also be used to help diagnose certain diseases or to drain cysts or abscesses.  What is EUS?  EUS shows both ultrasound images and live video of the GI tract. During the test, a flexible tube called an endoscope (scope) is used. At the end of the scope is a tiny video camera and light. The video camera sends live images to a monitor. The scope also contains a very small ultrasound device. This uses sound waves to create images and send them to a monitor.  A needle is passed through the scope. The needle can be used take a small sample of tissue for testing. This is called a biopsy. The needle can be used to take a sample of fluid. This is called fine-needle aspiration (FNA).  Risks and possible complications of EUS  Risks and possible complications include the following:  · Bleeding  · Infection  · A perforation (hole) in the digestive tract   · Risks of sedation or anesthesia   Before the test  Be prepared prior to the test:  · Tell your healthcare provider what medicine you take. This includes vitamins, herbs, and over-the-counter medicine. It also includes any blood thinners, such as warfarin, clopidogrel, ibuprofen, or daily aspirin. Ask your healthcare provider if you need to stop taking some or all of them before the test.  · You may be prescribed antibiotics to take before or after the test. This depends on the area being studied and what is done during the test. These medicines help prevent infection.  · Carefully follow the instructions for preparing for the test to make sure results are accurate. Instructions may include:  ¨ If youre having an EUS of the upper GI tract (esophagus, stomach, duodenum,  pancreas, liver):  § Do not eat or drink for 6 hours before the test.  ¨ If youre having an EUS of the lower GI tract (rectum):  § Before the test, do bowel prep as instructed to clean your rectum of stool. This may involve a clear liquid diet and using a laxative (liquid or pills) the night before the test. Or it may mean doing one or more enemas the morning of the test.  § Do not eat or drink for 6 hours before the test.  · Be sure to arrive on time at the facility. Bring your identification and health insurance card. Leave valuables at home. If you have them, bring X-rays or other test results with you.  Let the healthcare provider know  For your safety, tell the healthcare provider if you:  · Take insulin. Your dose may need to be changed on the day of your test.  · Are allergic to latex.  · Have any other allergies.  · Are taking blood thinners.   During the test  An endoscopic ultrasound usually takes place in a hospital. The procedure itself may take 1 to 2 hours. You will likely go home soon afterward. During the test:  · You lie on your left side on an exam table.  · An intravenous (IV) line will be put into a vein in your arm or hand. This line supplies fluids and medicines. To keep you comfortable during the test, you will be given a sedative medicine. This medicine prevents discomfort and will make you sleepy.  · If you are having an EUS of the upper GI tract, local anesthetic may be sprayed in your throat. This will help you be more comfortable as the healthcare provider inserts the scope. The healthcare provider then gently puts the flexible scope into your mouth or nose and down your throat.  · If youre having an EUS of the lower GI tract, the healthcare provider gently puts the flexible scope into your anus.  · During the test, the scope sends live video and ultrasound images from inside your body to nearby monitors. These are used to examine your GI tract. Specialized procedures, such as drainage,  are done as needed.  · The healthcare provider may discuss the results with you soon after the test. Biopsy results take several  days.  · In most cases, you can go home within a few hours of the test. When you leave the facility, have an adult family member or friend drive you, even if you don't feel that sleepy.  After the test  Here is what to expect after the test:  · You may feel tired from the sedative. This should wear off by the end of the day.  · If you had an upper digestive endoscopy, your throat may feel sore for a day or two. Over-the-counter sore throat lozenges and spray should help.  · You can eat and drink normally as soon as the test is done.  When to call the healthcare provider  Call your healthcare provider if you notice any of the following:  · Fever of 100.4°F (38.0°C) or higher, or as advised by your healthcare provider  · Shortness of breath  · Vomiting blood, blood in stool, or black stools  · Coughing or hoarse voice that wont go away   Date Last Reviewed: 7/1/2016  © 9596-8944 [employer]. [address] [place] [address]. All rights reserved. This information is not intended as a substitute for professional medical care. Always follow your healthcare professional's instructions.

## 2019-04-09 NOTE — DISCHARGE SUMMARY
Discharge Summary/Instructions after an Endoscopic Procedure    Patient Name: Rivka Jurado  Patient MRN: [id number]  Patient YOB: 1960 Tuesday, April 09, 2019 Demario Menjivar MD    RESTRICTIONS:  During your procedure today, you received medications for sedation.  These medications may affect your judgment, balance and coordination.  Therefore, for 24 hours, you have the following restrictions:     - DO NOT drive a car, operate machinery, make legal/financial decisions, sign important papers or drink alcohol.      ACTIVITY:  Today: no heavy lifting, straining or running due to procedural sedation/anesthesia.  The following day: return to full activity including work.    DIET:  Eat and drink normally unless instructed otherwise.     TREATMENT FOR COMMON SIDE EFFECTS:  - Mild abdominal pain, nausea, belching, bloating or excessive gas:  rest, eat lightly and use a heating pad.  - Sore Throat: treat with throat lozenges and/or gargle with warm salt water.  - Because air was used during the procedure, expelling large amounts of air from your rectum or belching is normal.  - If a bowel prep was taken, you may not have a bowel movement for 1-3 days.  This is normal.      SYMPTOMS TO WATCH FOR AND REPORT TO YOUR PHYSICIAN:  1. Abdominal pain or bloating, other than gas cramps.  2. Chest pain.  3. Back pain.  4. Signs of infection such as: chills or fever occurring within 24 hours after the procedure.  5. Rectal bleeding, which would show as bright red, maroon, or black stools. (A tablespoon of blood from the rectum is not serious, especially if hemorrhoids are present.)  6. Vomiting.  7. Weakness or dizziness.      GO DIRECTLY TO THE NEAREST EMERGENCY ROOM IF YOU HAVE ANY OF THE FOLLOWING:     Difficulty breathing              Chills and/or fever over 101 F   Persistent vomiting and/or vomiting blood   Severe abdominal pain   Severe chest pain   Black, tarry stools   Bleeding- more than one tablespoon   Any  other symptom or condition that you feel may need urgent attention    Your doctor recommends these additional instructions:  If any biopsies were taken, your doctors clinic will contact you in 1 to 2 weeks with any results.    - Discharge patient to home (ambulatory).   - Perform CT scan (computed tomography) of the abdomen with contrast (pancreas mas protocol) at appointment to be scheduled.   - The findings and recommendations were discussed with the patient.    For questions, problems or results please call your physician Demario Menjivar MD at Work:  [phone]    If you have any questions about the above instructions, call the GI department at [phone] or call the endoscopy unit at [phone] from 7am until 3 pm.    [facility] [place], EMERGENCY ROOM PHONE NUMBER: [phone]    IF A COMPLICATION OR EMERGENCY SITUATION ARISES AND YOU ARE UNABLE TO REACH YOUR PHYSICIAN - GO DIRECTLY TO THE EMERGENCY ROOM.    I have read or have had read to me these discharge instructions for my procedure and have received a written copy.  I understand these instructions and will follow-up with my physician if I have any questions.

## 2019-04-09 NOTE — ANESTHESIA PREPROCEDURE EVALUATION
04/09/2019  Rivka Jurado is a 58 y.o., female.    Pre-op Assessment    I have reviewed the Patient Summary Reports.     I have reviewed the Nursing Notes.   I have reviewed the Medications.     Review of Systems  Anesthesia Hx:  No problems with previous Anesthesia    Social:  Non-Smoker, No Alcohol Use    Hematology/Oncology:     Oncology Normal    -- Anemia:   EENT/Dental:   chronic allergic rhinitis   Cardiovascular:   Exercise tolerance: poor Hypertension, well controlled Angina, with exertion hyperlipidemia ECG has been reviewed.    Pulmonary:   Snore   Renal/:  Renal/ Normal     Hepatic/GI:   Bowel Prep. GERD, well controlled 2100 last drink of fluid/meal.   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  Questions answered. Anesthesia consent signed with patient.  ASA Score: 2     Day of Surgery Review of History & Physical: I have interviewed and examined the patient. I have reviewed the patient's H&P dated: 04/09/19. There are no significant changes.  H&P update referred to the surgeon.

## 2019-04-09 NOTE — ANESTHESIA POSTPROCEDURE EVALUATION
Anesthesia Post Evaluation    Patient: Rivka Jurado    Procedure(s) Performed: Procedure(s) (LRB):  ULTRASOUND, UPPER GI TRACT, ENDOSCOPIC (N/A)    Final Anesthesia Type: MAC  Patient location during evaluation: PACU  Patient participation: Yes- Able to Participate  Level of consciousness: awake and alert  Post-procedure vital signs: reviewed and stable  Pain management: adequate  Airway patency: patent  PONV status at discharge: No PONV  Anesthetic complications: no      Cardiovascular status: blood pressure returned to baseline  Respiratory status: unassisted  Hydration status: euvolemic  Follow-up not needed.          Vitals Value Taken Time   BP 94/64 4/9/2019 12:49 PM   Temp 36.4 °C (97.5 °F) 4/9/2019 12:49 PM   Pulse 78 4/9/2019 12:49 PM   Resp 16 4/9/2019 12:49 PM   SpO2 97 % 4/9/2019 12:49 PM         No case tracking events are documented in the log.      Pain/Nikko Score: Nikko Score: 9 (4/9/2019 12:49 PM)

## 2019-04-09 NOTE — H&P
History & Physical - Short Stay  Gastroenterology      SUBJECTIVE:     Procedure: EUS    Chief Complaint/Indication for Procedure: Abdominal Pain    History of Present Illness:  Patient is a 58 y.o. 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 She is S/P cholecystectomy.     PTA Medications   Medication Sig    benazepril (LOTENSIN) 20 MG tablet TAKE 1 TABLET BY MOUTH EVERY DAY    clonazePAM (KLONOPIN) 1 MG tablet TAKE 1 TABLET BY MOUTH EVERY EVENING    esomeprazole (NEXIUM) 40 MG capsule Take 1 capsule (40 mg total) by mouth 2 (two) times daily before meals. (Patient taking differently: Take 40 mg by mouth once daily. )    fluconazole (DIFLUCAN) 200 MG Tab Take 1 tablet (200 mg total) by mouth twice a week. (Patient taking differently: Take 200 mg by mouth as needed. )    fluticasone (FLONASE) 50 mcg/actuation nasal spray 2 sprays (100 mcg total) by Each Nare route once daily.    naproxen (NAPROSYN) 500 MG tablet Take 1 tablet (500 mg total) by mouth 2 (two) times daily with meals.    pregabalin (LYRICA) 75 MG capsule Take 1 capsule (75 mg total) by mouth 2 (two) times daily. (Patient taking differently: Take 75 mg by mouth once daily. )    tiZANidine (ZANAFLEX) 4 MG tablet Take 2 tablets (8 mg total) by mouth every evening.    tolterodine (DETROL LA) 4 MG 24 hr capsule TAKE 1 CAPSULE BY MOUTH EVERY EVENING    valACYclovir (VALTREX) 1000 MG tablet Take 1 tablet (1,000 mg total) by mouth once daily.    cetirizine (ZYRTEC) 10 MG tablet Take 1 tablet (10 mg total) by mouth once daily.    DULoxetine (CYMBALTA) 30 MG capsule TAKE 2 CAPSULES BY MOUTH EVERY DAY    hydrocortisone (ANUSOL-HC) 2.5 % rectal cream Place rectally as needed for Hemorrhoids.    linaclotide (LINZESS) 72 mcg Cap Take 1 capsule (72 mcg total) by mouth Daily.    lipase-protease-amylase 24,000-76,000-120,000 units (PANLIPASE) 24,000-76,000 -120,000 unit capsule Take 1-2 capsules by mouth  3 (three) times daily with meals.    metroNIDAZOLE (METROGEL) 0.75 % gel AAA of face bid    montelukast (SINGULAIR) 10 mg tablet Take 1 tablet (10 mg total) by mouth once daily.    mupirocin (BACTROBAN) 2 % ointment Apply topically 2 (two) times daily.    nitrofurantoin, macrocrystal-monohydrate, (MACROBID) 100 MG capsule Take 1 capsule (100 mg total) by mouth every evening.    nystatin (MYCOSTATIN) powder Apply topically 2 (two) times daily.    nystatin-triamcinolone (MYCOLOG II) cream Apply topically 2 (two) times daily.    promethazine (PHENERGAN) 25 MG tablet Take 1 tablet (25 mg total) by mouth every 6 (six) hours as needed for Nausea.    sulfacetamide sodium, acne, 10 % Susp Apply 1 application topically once daily.    triamcinolone acetonide 0.1% (KENALOG) 0.1 % cream Apply topically as needed.       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    [facility]    Recurrent UTI 10/13/2017    Right foot drop 10/13/2017    Urinary incontinence 10/13/2017    Weakness of right lower extremity 10/13/2017     Past Surgical History:   Procedure Laterality Date    BREAST SURGERY      breast lump removed    CARDIAC CATHETERIZATION Right 03/2018    wrist    CHOLECYSTECTOMY      foot drop surgery      HYSTERECTOMY      KNEE SURGERY      13     RECTAL SURGERY      TONSILLECTOMY       Family History   Problem Relation Age of Onset    Colon cancer Mother     Prostate cancer Father     Cancer Sister      Social  History     Tobacco Use    Smoking status: Never Smoker    Smokeless tobacco: Never Used   Substance Use Topics    Alcohol use: No    Drug use: Never       Review of Systems:  Respiratory: no cough or shortness of breath  Cardiovascular: no chest pain or palpitations  Gastrointestinal: positive for abdominal pain    OBJECTIVE:     Vital Signs (Most Recent)  Temp: 97.9 °F (36.6 °C) (04/09/19 1148)  Pulse: 88 (04/09/19 1148)  Resp: 18 (04/09/19 1148)  BP: (!) 144/89 (04/09/19 1148)  SpO2: 99 % (04/09/19 1148)    Physical Exam:  General: well developed, well nourished  Lungs:  normal respiratory effort  Heart: regular rate, S1, S2 normal  Abdomen: soft, non-tender non-distented; bowel sounds normal; no masses,  no organomegaly    Laboratory  CBC:   Recent Labs   Lab 04/02/19  1510   WBC 7.37   RBC 4.72   HGB 12.8   HCT 42.0   *   MCV 89   MCH 27.1   MCHC 30.5*     CMP:   Recent Labs   Lab 04/02/19  1510      CALCIUM 10.0   ALBUMIN 3.5   PROT 8.1      K 4.5   CO2 29      BUN 12   CREATININE 1.1   ALKPHOS 105   ALT 11   AST 15   BILITOT 0.4     Coagulation: No results for input(s): LABPROT, INR, APTT in the last 168 hours.      Diagnostic Results:      ASSESSMENT/PLAN:     Recurrent pancreatitis  Suspected choledochal cyst    Plan: EUS    Anesthesia Plan: MAC    ASA Grade: ASA 3 - Patient with moderate systemic disease with functional limitations     The impression and plan was discussed in detail with the patient and family. All questions have been answered and the patient voices understanding of our plan at this point. The risk of the procedure was discussed in detail which includes but not limited to bleeding, infection, perforation in some cases requiring surgery with its spectrum of complications.

## 2019-04-09 NOTE — TRANSFER OF CARE
"Anesthesia Transfer of Care Note    Patient: Rivka Jurado    Procedure(s) Performed: Procedure(s) (LRB):  ULTRASOUND, UPPER GI TRACT, ENDOSCOPIC (N/A)    Patient location: PACU    Anesthesia Type: MAC    Transport from OR: Transported from OR on room air with adequate spontaneous ventilation    Post pain: adequate analgesia    Post assessment: no apparent anesthetic complications    Post vital signs: stable    Level of consciousness: awake    Nausea/Vomiting: no nausea/vomiting    Complications: none    Transfer of care protocol was followed      Last vitals:   Visit Vitals  BP (!) 144/89   Pulse 88   Temp 36.6 °C (97.9 °F) (Skin)   Resp 18   Ht 5' 4" (1.626 m)   Wt 129.1 kg (284 lb 9.8 oz)   SpO2 99%   Breastfeeding? No   BMI 48.85 kg/m²     "

## 2019-04-09 NOTE — PROGRESS NOTES
EUS unremarkable. No sign of Choledochal cyst or chronic pancreatitis. Mild residual inflammation possible in the pancreatic tail. GH

## 2019-04-09 NOTE — [employer] PATIENT INSTRUCTIONS
Discharge Summary/Instructions after an Endoscopic Procedure  Patient Name: Rivka Jurado  Patient MRN: [id number]  Patient YOB: 1960 Tuesday, April 09, 2019 Demario Menjivar MD  RESTRICTIONS:  During your procedure today, you received medications for sedation.  These   medications may affect your judgment, balance and coordination.  Therefore,   for 24 hours, you have the following restrictions:   - DO NOT drive a car, operate machinery, make legal/financial decisions,   sign important papers or drink alcohol.    ACTIVITY:  Today: no heavy lifting, straining or running due to procedural   sedation/anesthesia.  The following day: return to full activity including work.  DIET:  Eat and drink normally unless instructed otherwise.     TREATMENT FOR COMMON SIDE EFFECTS:  - Mild abdominal pain, nausea, belching, bloating or excessive gas:  rest,   eat lightly and use a heating pad.  - Sore Throat: treat with throat lozenges and/or gargle with warm salt   water.  - Because air was used during the procedure, expelling large amounts of air   from your rectum or belching is normal.  - If a bowel prep was taken, you may not have a bowel movement for 1-3 days.    This is normal.  SYMPTOMS TO WATCH FOR AND REPORT TO YOUR PHYSICIAN:  1. Abdominal pain or bloating, other than gas cramps.  2. Chest pain.  3. Back pain.  4. Signs of infection such as: chills or fever occurring within 24 hours   after the procedure.  5. Rectal bleeding, which would show as bright red, maroon, or black stools.   (A tablespoon of blood from the rectum is not serious, especially if   hemorrhoids are present.)  6. Vomiting.  7. Weakness or dizziness.  GO DIRECTLY TO THE NEAREST EMERGENCY ROOM IF YOU HAVE ANY OF THE FOLLOWING:      Difficulty breathing              Chills and/or fever over 101 F   Persistent vomiting and/or vomiting blood   Severe abdominal pain   Severe chest pain   Black, tarry stools   Bleeding- more than one  tablespoon   Any other symptom or condition that you feel may need urgent attention  Your doctor recommends these additional instructions:  If any biopsies were taken, your doctors clinic will contact you in 1 to 2   weeks with any results.  - Discharge patient to home (ambulatory).   - Perform CT scan (computed tomography) of the abdomen with contrast   (pancreas mas protocol) at appointment to be scheduled.   - The findings and recommendations were discussed with the patient.  For questions, problems or results please call your physician Demario Menjivar MD at Work:  [phone]  If you have any questions about the above instructions, call the GI   department at [phone] or call the endoscopy unit at [phone]   from 7am until 3 pm.  [facility], EMERGENCY ROOM PHONE NUMBER:   [phone]  IF A COMPLICATION OR EMERGENCY SITUATION ARISES AND YOU ARE UNABLE TO REACH   YOUR PHYSICIAN - GO DIRECTLY TO THE EMERGENCY ROOM.  I have read or have had read to me these discharge instructions for my   procedure and have received a written copy.  I understand these   instructions and will follow-up with my physician if I have any questions.     __________________________________       _____________________________________  Nurse Signature                                          Patient/Designated   Responsible Party Signature  Demario Menjivar MD  4/9/2019 12:55:25 PM  This report has been verified and signed electronically.  [employer]

## 2019-04-10 ENCOUNTER — TELEPHONE (OUTPATIENT)
Dept: GASTROENTEROLOGY | Facility: CLINIC | Age: 59
End: 2019-04-10

## 2019-04-10 DIAGNOSIS — F41.9 ANXIETY: ICD-10-CM

## 2019-04-10 DIAGNOSIS — K85.90 RECURRENT ACUTE PANCREATITIS: Primary | ICD-10-CM

## 2019-04-10 RX ORDER — CLONAZEPAM 1 MG/1
1 TABLET ORAL NIGHTLY
Qty: 90 TABLET | Refills: 0 | Status: SHIPPED | OUTPATIENT
Start: 2019-04-10 | End: 2019-07-29 | Stop reason: SDUPTHER

## 2019-04-10 NOTE — TELEPHONE ENCOUNTER
----- Message from Lizeth Antonio sent at 4/10/2019  9:15 AM CDT -----  Contact: pt   Call pt regarding getting orders in for CT scan. Pt states that she was told by Dr. Santana that she need to have one done.     [phone] (home)

## 2019-04-10 NOTE — TELEPHONE ENCOUNTER
Patient stated she was told by Dr. Menjivar a ct order need to be put in and patient would like that done asap. Please put in order if you think that is necessay.

## 2019-04-10 NOTE — TELEPHONE ENCOUNTER
Patient states that she have not been able to see Dr. Elizabeth because of all the tests and procedures.  Patient states that her anxiety is all time high.  Patient has to have pancreatic protocol tests done per Dr. Menjivar.

## 2019-04-11 ENCOUNTER — TELEPHONE (OUTPATIENT)
Dept: ENDOSCOPY | Facility: HOSPITAL | Age: 59
End: 2019-04-11

## 2019-04-11 ENCOUNTER — TELEPHONE (OUTPATIENT)
Dept: RADIOLOGY | Facility: HOSPITAL | Age: 59
End: 2019-04-11

## 2019-04-11 NOTE — TELEPHONE ENCOUNTER
----- Message from Carmen Mcdaniels MA sent at 4/11/2019  8:26 AM CDT -----  Contact: [phone]  Needs Advice    Reason for call: Pt was seen by Dr Menjivar in [place] and he is ordering a CT (pancreas protocol) and she has not had any success in finding anyone that can set that up for her. Can you help?          Communication Preference:  [phone]    Additional Information: please advise

## 2019-04-12 ENCOUNTER — TELEPHONE (OUTPATIENT)
Dept: ENDOSCOPY | Facility: HOSPITAL | Age: 59
End: 2019-04-12

## 2019-04-12 ENCOUNTER — HOSPITAL ENCOUNTER (OUTPATIENT)
Dept: RADIOLOGY | Facility: HOSPITAL | Age: 59
Discharge: HOME | End: 2019-04-12
Attending: INTERNAL MEDICINE
Payer: MEDICARE

## 2019-04-12 DIAGNOSIS — K85.90 RECURRENT ACUTE PANCREATITIS: ICD-10-CM

## 2019-04-12 PROCEDURE — 74177 CT ABD & PELVIS W/CONTRAST: CPT | Mod: TC

## 2019-04-12 PROCEDURE — 74177 CT ABDOMEN PELVIS WITH CONTRAST: ICD-10-PCS | Mod: 26,,, | Performed by: RADIOLOGY

## 2019-04-12 PROCEDURE — 74177 CT ABD & PELVIS W/CONTRAST: CPT | Mod: 26,,, | Performed by: RADIOLOGY

## 2019-04-12 PROCEDURE — 25500020 PHARM REV CODE 255: Performed by: INTERNAL MEDICINE

## 2019-04-12 RX ADMIN — IOHEXOL 100 ML: 350 INJECTION, SOLUTION INTRAVENOUS at 02:04

## 2019-04-12 NOTE — TELEPHONE ENCOUNTER
----- Message from Nayana Ybarra sent at 4/12/2019  2:27 PM CDT -----  Contact: [phone]  mya    Needs Advice    Reason for call: asking for ct results        Communication Preference:[phone]    Additional Information:

## 2019-04-15 ENCOUNTER — TELEPHONE (OUTPATIENT)
Dept: GASTROENTEROLOGY | Facility: CLINIC | Age: 59
End: 2019-04-15

## 2019-04-15 ENCOUNTER — TELEPHONE (OUTPATIENT)
Dept: ENDOSCOPY | Facility: HOSPITAL | Age: 59
End: 2019-04-15

## 2019-04-15 DIAGNOSIS — K85.90 ACUTE PANCREATITIS, UNSPECIFIED COMPLICATION STATUS, UNSPECIFIED PANCREATITIS TYPE: Primary | ICD-10-CM

## 2019-04-15 NOTE — TELEPHONE ENCOUNTER
----- Message from Leigha Rivas sent at 4/15/2019  9:21 AM CDT -----  Contact: self  Type:  Test Results    Who Called: Patient   Name of Test (Lab/Mammo/Etc): CT  Date of Test: 04/12  Ordering Provider: Dr. Santana  Where the test was performed: [facility]  Would the patient rather a call back or a response via [facility]? Call back  Best Call Back Number: [phone]  Additional Information:  Pt would like a call back as soon as possible.     Thanks,  Leigha Rivas

## 2019-04-15 NOTE — TELEPHONE ENCOUNTER
Still having RUQ discomfort and nausea. She is very concerned since she is still having symptoms and she has a strong family history of pancreatic cancer    Please sign order

## 2019-04-15 NOTE — TELEPHONE ENCOUNTER
----- Message from Nunu Huang sent at 4/15/2019  8:07 AM CDT -----  Contact: Self- [phone]  Otto- pt called to determine if ct results were in yet- please contact pt at [phone]

## 2019-04-15 NOTE — TELEPHONE ENCOUNTER
Results were given. Handled in result review  Patient verbalied understanding.Patient stated she will call back and schedule appt.

## 2019-04-16 ENCOUNTER — TELEPHONE (OUTPATIENT)
Dept: GASTROENTEROLOGY | Facility: CLINIC | Age: 59
End: 2019-04-16

## 2019-04-16 NOTE — TELEPHONE ENCOUNTER
----- Message from Misheleulogio Chan sent at 4/16/2019  8:04 AM CDT -----  Contact: pt  Type:  Sooner Apoointment Request    Caller is requesting a sooner appointment.  Caller declined first available appointment listed below.  Caller will not accept being placed on the waitlist and is requesting a message be sent to doctor.  Name of Caller: pt   When is the first available appointment? 04/30  Symptoms: follow up with doctor on findings in MRI  Would the patient rather a call back or a response via [facility]? Call back  Best Call Back Number: [phone]  Additional Information:

## 2019-04-17 DIAGNOSIS — R32 URINARY INCONTINENCE, UNSPECIFIED TYPE: ICD-10-CM

## 2019-04-17 DIAGNOSIS — N39.0 RECURRENT UTI: ICD-10-CM

## 2019-04-17 NOTE — TELEPHONE ENCOUNTER
----- Message from Tiffany Maldonado sent at 4/17/2019  1:10 PM CDT -----  .Type:  Needs Medical Advice    Who Called: Abhinav ( [employer] )  Symptoms (please be specific): pain and diarrhea  How long has patient had these symptoms:  Few weeks   Pharmacy name and phone #:  ..  [facility] [place] - [address]  [address] [place] [address]  Phone: [phone] Fax: [phone]          Would the patient rather a call back or a response via [facility]? Call back   Best Call Back Number: [phone] or [phone] (pt )   Additional Information:

## 2019-04-17 NOTE — TELEPHONE ENCOUNTER
Patient wants to know if you could send something in to the pharmacy for diarrhea.  Patient states that she's having to take Lomotil but was told to use it wisely.  Patient also stated that she had CT scan done Friday and appointment with Dr. Santana to discuss recommendations.  Patient says she's miserable.

## 2019-04-17 NOTE — TELEPHONE ENCOUNTER
----- Message from Bell Esparza sent at 4/17/2019 12:04 PM CDT -----  Contact: Patient  Patient called to speak with the nurse about the results of her CT scan.    She also needs 2 refills:    1. Detrol LA (not sure of the mg) 90 day  2. Klonopin 1 mg @ bedtime - 30 day supply    [facility] [place] - [address] [facility] [address]  [address] [place] [address]  Phone: [phone] Fax: [phone]    She can be contacted at [phone].    Thanks,  Bell

## 2019-04-18 RX ORDER — TOLTERODINE 4 MG/1
4 CAPSULE, EXTENDED RELEASE ORAL NIGHTLY
Qty: 90 CAPSULE | Refills: 3 | Status: SHIPPED | OUTPATIENT
Start: 2019-04-18 | End: 2020-04-13

## 2019-04-23 ENCOUNTER — OFFICE VISIT (OUTPATIENT)
Dept: GASTROENTEROLOGY | Facility: CLINIC | Age: 59
End: 2019-04-23
Payer: MEDICARE

## 2019-04-23 ENCOUNTER — LAB VISIT (OUTPATIENT)
Dept: LAB | Facility: HOSPITAL | Age: 59
End: 2019-04-23
Attending: INTERNAL MEDICINE
Payer: MEDICARE

## 2019-04-23 VITALS
BODY MASS INDEX: 50.02 KG/M2 | HEART RATE: 80 BPM | DIASTOLIC BLOOD PRESSURE: 86 MMHG | WEIGHT: 293 LBS | SYSTOLIC BLOOD PRESSURE: 118 MMHG | HEIGHT: 64 IN

## 2019-04-23 DIAGNOSIS — K86.89 PANCREATIC MASS: ICD-10-CM

## 2019-04-23 DIAGNOSIS — K85.00 IDIOPATHIC ACUTE PANCREATITIS WITHOUT INFECTION OR NECROSIS: Primary | ICD-10-CM

## 2019-04-23 DIAGNOSIS — K85.00 IDIOPATHIC ACUTE PANCREATITIS WITHOUT INFECTION OR NECROSIS: ICD-10-CM

## 2019-04-23 LAB
AMYLASE SERPL-CCNC: 22 U/L (ref 20–110)
CRP SERPL-MCNC: 14.4 MG/L (ref 0–8.2)
LIPASE SERPL-CCNC: 11 U/L (ref 4–60)

## 2019-04-23 PROCEDURE — 86140 C-REACTIVE PROTEIN: CPT

## 2019-04-23 PROCEDURE — 3008F PR BODY MASS INDEX (BMI) DOCUMENTED: ICD-10-PCS | Mod: CPTII,S$GLB,, | Performed by: INTERNAL MEDICINE

## 2019-04-23 PROCEDURE — 99213 OFFICE O/P EST LOW 20 MIN: CPT | Mod: S$GLB,,, | Performed by: INTERNAL MEDICINE

## 2019-04-23 PROCEDURE — 99999 PR PBB SHADOW E&M-EST. PATIENT-LVL III: ICD-10-PCS | Mod: PBBFAC,,, | Performed by: INTERNAL MEDICINE

## 2019-04-23 PROCEDURE — 3074F PR MOST RECENT SYSTOLIC BLOOD PRESSURE < 130 MM HG: ICD-10-PCS | Mod: CPTII,S$GLB,, | Performed by: INTERNAL MEDICINE

## 2019-04-23 PROCEDURE — 3008F BODY MASS INDEX DOCD: CPT | Mod: CPTII,S$GLB,, | Performed by: INTERNAL MEDICINE

## 2019-04-23 PROCEDURE — 82150 ASSAY OF AMYLASE: CPT

## 2019-04-23 PROCEDURE — 83690 ASSAY OF LIPASE: CPT

## 2019-04-23 PROCEDURE — 3079F PR MOST RECENT DIASTOLIC BLOOD PRESSURE 80-89 MM HG: ICD-10-PCS | Mod: CPTII,S$GLB,, | Performed by: INTERNAL MEDICINE

## 2019-04-23 PROCEDURE — 3079F DIAST BP 80-89 MM HG: CPT | Mod: CPTII,S$GLB,, | Performed by: INTERNAL MEDICINE

## 2019-04-23 PROCEDURE — 36415 COLL VENOUS BLD VENIPUNCTURE: CPT

## 2019-04-23 PROCEDURE — 99999 PR PBB SHADOW E&M-EST. PATIENT-LVL III: CPT | Mod: PBBFAC,,, | Performed by: INTERNAL MEDICINE

## 2019-04-23 PROCEDURE — 99213 PR OFFICE/OUTPT VISIT, EST, LEVL III, 20-29 MIN: ICD-10-PCS | Mod: S$GLB,,, | Performed by: INTERNAL MEDICINE

## 2019-04-23 PROCEDURE — 3074F SYST BP LT 130 MM HG: CPT | Mod: CPTII,S$GLB,, | Performed by: INTERNAL MEDICINE

## 2019-04-23 NOTE — PROGRESS NOTES
Subjective:       Patient ID: Rivka Jurado is a 58 y.o. female.    Chief Complaint: No chief complaint on file.    The patient is here for follow-up visit regarding recurrent bouts of pancreatitis.  She was seen in the office on April 8th and notes from that visit on review today for the patient's follow-up.  She was scheduled at the time of the 1st visit for an endoscopic ultrasound which was carried out on April 9th by Dr. Demario Menjivar from [facility], an abnormalities found in the area of the tail of the pancreas prompted the need for a abdominal CT with pancreas protocol which took place on April 12.  The results of the lab data revealed the presence of an inflammatory mass in the area of the tail of the pancreas with neoplasm diagnosis felt less likely.  While this was consistent with the findings of the EUS, it is felt that the lesion will require close follow-up.    It was my intention to a discussion to schedule the patient for followup monitoring with repeat CT; however, the patient notified me that she had already been contacted by [facility] to arrange for follow-up evaluation by EUS.  I informed them that I had not seen this documented in our record; however, I am in agreement as this is certainly a very reasonable approach.  We will await the results of such intervention in see what recommendations come out of that study.    Review of Systems   Constitutional: Positive for fatigue. Negative for activity change, appetite change, chills, diaphoresis, fever and unexpected weight change.   HENT: Positive for postnasal drip. Negative for congestion, ear discharge, ear pain, hearing loss, nosebleeds and tinnitus.    Eyes: Negative for photophobia and visual disturbance.   Respiratory: Negative for apnea, cough, choking, chest tightness, shortness of breath and wheezing.    Cardiovascular: Negative for chest pain, palpitations and leg swelling.   Gastrointestinal: Negative for abdominal  distention, abdominal pain, anal bleeding, blood in stool, constipation, diarrhea, nausea, rectal pain and vomiting.   Genitourinary: Negative for difficulty urinating, dyspareunia, dysuria, flank pain, frequency, hematuria, menstrual problem, pelvic pain, urgency, vaginal bleeding and vaginal discharge.   Musculoskeletal: Positive for back pain. Negative for arthralgias, gait problem, joint swelling, myalgias and neck stiffness.   Skin: Negative for pallor and rash.   Neurological: Positive for headaches. Negative for dizziness, tremors, seizures, syncope, speech difficulty, weakness and numbness.   Hematological: Negative for adenopathy.   Psychiatric/Behavioral: Negative for agitation, confusion, hallucinations, sleep disturbance and suicidal ideas.       Objective:      Physical Exam    Assessment:   Chronic pancreatitis now suggested on EUS and CT   History of idiopathic acute pancreatitis with mass effect in the pancreatic tail evolving into above  No diagnosis found.    Plan:   Short term follow-up evaluation by EUS as recommended  C reactive protein  Pancreatic enzymes

## 2019-04-25 ENCOUNTER — TELEPHONE (OUTPATIENT)
Dept: GASTROENTEROLOGY | Facility: CLINIC | Age: 59
End: 2019-04-25

## 2019-04-25 NOTE — TELEPHONE ENCOUNTER
----- Message from Mer Richardson sent at 4/25/2019  1:03 PM CDT -----  Contact: Self  Patient requesting a call back regarding ultrasound. Patient is wanting to know when it will be scheduled. Please call back at [phone]

## 2019-04-25 NOTE — TELEPHONE ENCOUNTER
Returned patients call and  Called patient and scheduled patient EUS. Patient verbalized understanding.

## 2019-04-29 RX ORDER — NAPROXEN 500 MG/1
TABLET ORAL
Qty: 180 TABLET | Refills: 0 | OUTPATIENT
Start: 2019-04-29

## 2019-05-01 DIAGNOSIS — R10.13 ABDOMINAL PAIN, ACUTE, EPIGASTRIC: ICD-10-CM

## 2019-05-01 DIAGNOSIS — R11.2 NON-INTRACTABLE VOMITING WITH NAUSEA, UNSPECIFIED VOMITING TYPE: ICD-10-CM

## 2019-05-01 RX ORDER — ESOMEPRAZOLE MAGNESIUM 40 MG/1
40 CAPSULE, DELAYED RELEASE ORAL
Qty: 60 CAPSULE | Refills: 11 | Status: SHIPPED | OUTPATIENT
Start: 2019-05-01 | End: 2019-05-09 | Stop reason: SDUPTHER

## 2019-05-01 NOTE — TELEPHONE ENCOUNTER
----- Message from Ann Lewis sent at 5/1/2019  9:06 AM CDT -----  Contact: self  .Type:  RX Refill Request    Who Called:self  Refill or New Rx:refil  RX Name and Strength: nexium 40mg  How is the patient currently taking it? (ex. 1XDay):1/day  Is this a 30 day or 90 day RX:90  Preferred Pharmacy with phone number:.  [facility] [place] - [address] [facility]  [address] [place] [address]  Phone: [phone] Fax: [phone]    Local or Mail Order:local  Ordering Provider:alonzo  Would the patient rather a call back or a response via [facility]? call  Best Call Back Number:[phone]  Additional Information:

## 2019-05-01 NOTE — TELEPHONE ENCOUNTER
Patient does not have diabetes, chronic kidney disease, or any other indication for which [employer] will cover nutritional referral so I am unable to order it.  They will not cover for weight loss, her morbid obesity, or her chronic pancreatitis.  She would need to pay cash for the visits.    If interested in that, give her the phone number to call nutritional services directly please.

## 2019-05-08 ENCOUNTER — TELEPHONE (OUTPATIENT)
Dept: INTERNAL MEDICINE | Facility: CLINIC | Age: 59
End: 2019-05-08

## 2019-05-08 NOTE — TELEPHONE ENCOUNTER
Insurance not covering nexium for twice daily dose.  Patient wants to know is there's something else she could take because she cannot wait for PA.

## 2019-05-09 DIAGNOSIS — R10.13 ABDOMINAL PAIN, ACUTE, EPIGASTRIC: ICD-10-CM

## 2019-05-09 DIAGNOSIS — R11.2 NON-INTRACTABLE VOMITING WITH NAUSEA, UNSPECIFIED VOMITING TYPE: ICD-10-CM

## 2019-05-09 RX ORDER — ESOMEPRAZOLE MAGNESIUM 40 MG/1
40 CAPSULE, DELAYED RELEASE ORAL
Qty: 90 CAPSULE | Refills: 3 | Status: SHIPPED | OUTPATIENT
Start: 2019-05-09 | End: 2020-04-29

## 2019-05-09 NOTE — TELEPHONE ENCOUNTER
Patient states that she's only taking nexium once daily.  Please re-send nexium script for once daily to [facility].

## 2019-05-17 ENCOUNTER — TELEPHONE (OUTPATIENT)
Dept: ENDOSCOPY | Facility: HOSPITAL | Age: 59
End: 2019-05-17

## 2019-05-17 DIAGNOSIS — R19.7 DIARRHEA, UNSPECIFIED TYPE: Primary | ICD-10-CM

## 2019-05-17 NOTE — TELEPHONE ENCOUNTER
Recommended low fat diet, fecal elastase level and keep the EUS appointment for 5/28/2019.   Demario Menjivar MD

## 2019-05-17 NOTE — TELEPHONE ENCOUNTER
----- Message from Violetta Seo sent at 5/17/2019 10:20 AM CDT -----  Contact: pt  Type:  Needs Medical Advice    Who Called: the pt  Symptoms (please be specific): Chronic diarrhea   How long has patient had these symptoms: Over a month  Pharmacy name and phone #:  n/a  Would the patient rather a call back or a response via [facility]? Call back  Best Call Back Number: [phone]  Additional Information: n/a

## 2019-05-17 NOTE — TELEPHONE ENCOUNTER
Patient says that she has had diarrhea for over a month. Wants to know if it could be related to her pancreatitis. Her stool looks oily  Taking Lomotil. It is not helping. Is there anything you can suggest?

## 2019-05-22 ENCOUNTER — LAB VISIT (OUTPATIENT)
Dept: LAB | Facility: HOSPITAL | Age: 59
End: 2019-05-22
Attending: FAMILY MEDICINE
Payer: MEDICARE

## 2019-05-22 ENCOUNTER — OFFICE VISIT (OUTPATIENT)
Dept: INTERNAL MEDICINE | Facility: CLINIC | Age: 59
End: 2019-05-22
Payer: MEDICARE

## 2019-05-22 ENCOUNTER — TELEPHONE (OUTPATIENT)
Dept: INTERNAL MEDICINE | Facility: CLINIC | Age: 59
End: 2019-05-22

## 2019-05-22 VITALS
HEIGHT: 64 IN | TEMPERATURE: 99 F | SYSTOLIC BLOOD PRESSURE: 132 MMHG | DIASTOLIC BLOOD PRESSURE: 84 MMHG | BODY MASS INDEX: 50.02 KG/M2 | WEIGHT: 293 LBS

## 2019-05-22 DIAGNOSIS — K52.9 CHRONIC DIARRHEA: ICD-10-CM

## 2019-05-22 DIAGNOSIS — L65.9 HAIR LOSS: ICD-10-CM

## 2019-05-22 DIAGNOSIS — L65.9 HAIR LOSS: Primary | ICD-10-CM

## 2019-05-22 PROBLEM — E66.01 MORBID OBESITY WITH BMI OF 50.0-59.9, ADULT: Chronic | Status: ACTIVE | Noted: 2018-01-29

## 2019-05-22 LAB — TSH SERPL DL<=0.005 MIU/L-ACNC: 2.03 UIU/ML (ref 0.4–4)

## 2019-05-22 PROCEDURE — 99499 RISK ADDL DX/OHS AUDIT: ICD-10-PCS | Mod: S$GLB,,, | Performed by: FAMILY MEDICINE

## 2019-05-22 PROCEDURE — 3079F DIAST BP 80-89 MM HG: CPT | Mod: CPTII,S$GLB,, | Performed by: FAMILY MEDICINE

## 2019-05-22 PROCEDURE — 36415 COLL VENOUS BLD VENIPUNCTURE: CPT

## 2019-05-22 PROCEDURE — 3075F PR MOST RECENT SYSTOLIC BLOOD PRESS GE 130-139MM HG: ICD-10-PCS | Mod: CPTII,S$GLB,, | Performed by: FAMILY MEDICINE

## 2019-05-22 PROCEDURE — 84443 ASSAY THYROID STIM HORMONE: CPT

## 2019-05-22 PROCEDURE — 3008F BODY MASS INDEX DOCD: CPT | Mod: CPTII,S$GLB,, | Performed by: FAMILY MEDICINE

## 2019-05-22 PROCEDURE — 99213 OFFICE O/P EST LOW 20 MIN: CPT | Mod: S$GLB,,, | Performed by: FAMILY MEDICINE

## 2019-05-22 PROCEDURE — 99499 UNLISTED E&M SERVICE: CPT | Mod: S$GLB,,, | Performed by: FAMILY MEDICINE

## 2019-05-22 PROCEDURE — 3008F PR BODY MASS INDEX (BMI) DOCUMENTED: ICD-10-PCS | Mod: CPTII,S$GLB,, | Performed by: FAMILY MEDICINE

## 2019-05-22 PROCEDURE — 99999 PR PBB SHADOW E&M-EST. PATIENT-LVL IV: ICD-10-PCS | Mod: PBBFAC,,, | Performed by: FAMILY MEDICINE

## 2019-05-22 PROCEDURE — 3075F SYST BP GE 130 - 139MM HG: CPT | Mod: CPTII,S$GLB,, | Performed by: FAMILY MEDICINE

## 2019-05-22 PROCEDURE — 3079F PR MOST RECENT DIASTOLIC BLOOD PRESSURE 80-89 MM HG: ICD-10-PCS | Mod: CPTII,S$GLB,, | Performed by: FAMILY MEDICINE

## 2019-05-22 PROCEDURE — 99999 PR PBB SHADOW E&M-EST. PATIENT-LVL IV: CPT | Mod: PBBFAC,,, | Performed by: FAMILY MEDICINE

## 2019-05-22 PROCEDURE — 99213 PR OFFICE/OUTPT VISIT, EST, LEVL III, 20-29 MIN: ICD-10-PCS | Mod: S$GLB,,, | Performed by: FAMILY MEDICINE

## 2019-05-22 RX ORDER — DULOXETIN HYDROCHLORIDE 30 MG/1
CAPSULE, DELAYED RELEASE ORAL
Qty: 60 CAPSULE | Refills: 1 | Status: SHIPPED | OUTPATIENT
Start: 2019-05-22 | End: 2019-06-04 | Stop reason: SDUPTHER

## 2019-05-22 NOTE — PROGRESS NOTES
"Subjective:   Patient ID: Rivka Jurado is a 58 y.o. female.  Chief Complaint:  Mass (in Pancreas); Hair Loss; Nausea; and Anorexia      Patient presents for evaluation of hair loss and to discuss chronic diarrhea/pancreatitis.    Hair loss is not causing any alopecia or scarring.  More thinning and excessive falling out with shampooing and brushing hair.    April 2019 CBC, BMP, amylase, lipase, LFT all normal.  Albumin 3.5.    February 2019 A1c and lipid panel normal.    No recent TSH on file.    Regarding chronic diarrhea, restarted Creon 24,000   Two capsules before meals.  Has decreased episodes to 1-2 a day and more manageable.    Questions if there is anything else she can take.    Sees GI.  For pancreatic mass undergoing additional evaluation by Dr. Menjivar from [facility].    Review of Systems   Constitutional: Negative for chills and fever.   Respiratory: Negative for cough and shortness of breath.    Cardiovascular: Negative for chest pain and leg swelling.   Gastrointestinal: Positive for diarrhea and nausea. Negative for abdominal pain and vomiting.   Genitourinary: Negative for difficulty urinating.   Musculoskeletal: Positive for arthralgias, back pain and gait problem. Negative for myalgias.   Skin: Negative for rash.   Neurological: Negative for headaches.   Hematological: Negative for adenopathy. Bruises/bleeds easily.   Psychiatric/Behavioral: Negative for sleep disturbance.     Objective:   /84 (BP Location: Left arm, Patient Position: Sitting, BP Method: Large (Manual))   Temp 98.6 °F (37 °C) (Tympanic)   Ht 5' 4" (1.626 m)   Wt 133.1 kg (293 lb 6.9 oz)   BMI 50.37 kg/m²     Physical Exam   Constitutional: Vital signs are normal. She appears well-developed and well-nourished.   Morbid obesity   HENT:   Overall thinning hair, but no true alopecia or hair loss detected.   Cardiovascular: Normal rate and regular rhythm.   Pulmonary/Chest: Effort normal. No respiratory distress. "   Musculoskeletal: She exhibits no edema.   Skin: Skin is warm and dry. No rash noted.   Psychiatric: She has a normal mood and affect. Her behavior is normal. Judgment and thought content normal.   Nursing note and vitals reviewed.    Assessment:       ICD-10-CM ICD-9-CM   1. Hair loss L65.9 704.00   2. Chronic diarrhea K52.9 787.91     Plan:   Hair loss  -     TSH; Future; Expected date: 05/22/2019  Check TSH.    Treat if abnormal.    If normal, referral to Dermatology.      Chronic diarrhea  Continue Creon 24,002 capsules before meals.    Sent message to GI to see if okay for patient to try daily cholestyramine.      Follow-up all specialists scheduled.  Return to clinic as scheduled.

## 2019-05-22 NOTE — TELEPHONE ENCOUNTER
----- Message from Kennedy Singh MD sent at 5/22/2019 11:52 AM CDT -----  Please let patient know Dr Demario davalosayed use of Questran to help control diarrhea.    I recommend she start after her procedures this week   If needed.    For now she can continue the Creon capsules.    ----- Message -----  From: Demario Menjivar MD  Sent: 5/22/2019  11:05 AM  To: Kennedy Singh MD    Ok with me. She will need a fecal elastase either way.  Thanks!  Demario  ----- Message -----  From: Kennedy Singh MD  Sent: 5/22/2019  10:37 AM  To: MD Dr Otto Espino,    She saw me today    She restarted Creon 24k 2 capsules before meals to help with diarrhea.   Has decreased to 1-2 episodes daily and more manageable.  Will this interfere with any of your planned testing and procedures?    Also, she would like to try Questran Light packet daily to see if it would help.  Any reason she should not do this?    Thanks  Kennedy Singh MD

## 2019-05-23 ENCOUNTER — TELEPHONE (OUTPATIENT)
Dept: INTERNAL MEDICINE | Facility: CLINIC | Age: 59
End: 2019-05-23

## 2019-05-23 DIAGNOSIS — K52.9 CHRONIC DIARRHEA: ICD-10-CM

## 2019-05-23 DIAGNOSIS — L65.9 HAIR LOSS: Primary | ICD-10-CM

## 2019-05-23 RX ORDER — CHOLESTYRAMINE 4 G/9G
4 POWDER, FOR SUSPENSION ORAL DAILY
Qty: 378 G | Refills: 0 | Status: SHIPPED | OUTPATIENT
Start: 2019-05-23 | End: 2019-12-12 | Stop reason: SDUPTHER

## 2019-05-23 NOTE — TELEPHONE ENCOUNTER
----- Message from Irina Cardenas sent at 5/23/2019  9:30 AM CDT -----  Contact: patient  Type:  Test Results    Who Called: patient  Name of Test (Lab/Mammo/Etc): blood test  Date of Test: yesterday  Ordering Provider: Francisco  Where the test was performed: [facility]  Would the patient rather a call back or a response via [facility]? call  Best Call Back Number: [phone]  Additional Information:  Please call

## 2019-05-24 ENCOUNTER — TELEPHONE (OUTPATIENT)
Dept: ENDOSCOPY | Facility: HOSPITAL | Age: 59
End: 2019-05-24

## 2019-05-24 DIAGNOSIS — R19.7 DIARRHEA, UNSPECIFIED TYPE: Primary | ICD-10-CM

## 2019-05-28 ENCOUNTER — ANESTHESIA EVENT (OUTPATIENT)
Dept: ENDOSCOPY | Facility: HOSPITAL | Age: 59
End: 2019-05-28
Payer: MEDICARE

## 2019-05-28 ENCOUNTER — TELEPHONE (OUTPATIENT)
Dept: INTERNAL MEDICINE | Facility: CLINIC | Age: 59
End: 2019-05-28

## 2019-05-28 ENCOUNTER — ANESTHESIA (OUTPATIENT)
Dept: ENDOSCOPY | Facility: HOSPITAL | Age: 59
End: 2019-05-28
Payer: MEDICARE

## 2019-05-28 ENCOUNTER — HOSPITAL ENCOUNTER (OUTPATIENT)
Facility: HOSPITAL | Age: 59
Discharge: HOME | End: 2019-05-28
Attending: INTERNAL MEDICINE | Admitting: INTERNAL MEDICINE
Payer: MEDICARE

## 2019-05-28 VITALS
HEART RATE: 58 BPM | TEMPERATURE: 98 F | RESPIRATION RATE: 18 BRPM | OXYGEN SATURATION: 97 % | DIASTOLIC BLOOD PRESSURE: 84 MMHG | WEIGHT: 291.25 LBS | HEIGHT: 64 IN | BODY MASS INDEX: 49.72 KG/M2 | SYSTOLIC BLOOD PRESSURE: 134 MMHG

## 2019-05-28 DIAGNOSIS — K86.89 PANCREATIC MASS: ICD-10-CM

## 2019-05-28 DIAGNOSIS — I10 HYPERTENSION, ESSENTIAL: ICD-10-CM

## 2019-05-28 PROCEDURE — 63600175 PHARM REV CODE 636 W HCPCS: Performed by: NURSE ANESTHETIST, CERTIFIED REGISTERED

## 2019-05-28 PROCEDURE — 37000008 HC ANESTHESIA 1ST 15 MINUTES: Performed by: INTERNAL MEDICINE

## 2019-05-28 PROCEDURE — 43242 EGD US FINE NEEDLE BX/ASPIR: CPT | Mod: ,,, | Performed by: INTERNAL MEDICINE

## 2019-05-28 PROCEDURE — 43242 PR UPGI ENDOSCOPY,FN NEEDLE BX,GUIDED: ICD-10-PCS | Mod: ,,, | Performed by: INTERNAL MEDICINE

## 2019-05-28 PROCEDURE — 88173 CYTOPATH EVAL FNA REPORT: CPT | Performed by: PATHOLOGY

## 2019-05-28 PROCEDURE — 25000003 PHARM REV CODE 250: Performed by: INTERNAL MEDICINE

## 2019-05-28 PROCEDURE — 37000009 HC ANESTHESIA EA ADD 15 MINS: Performed by: INTERNAL MEDICINE

## 2019-05-28 PROCEDURE — 25000003 PHARM REV CODE 250: Performed by: NURSE ANESTHETIST, CERTIFIED REGISTERED

## 2019-05-28 PROCEDURE — 88173 CYTOLOGY SPECIMEN- FNA RADIOLOGY GUIDED, BRONCH/EBUS, EUS/GI: ICD-10-PCS | Mod: 26,,, | Performed by: PATHOLOGY

## 2019-05-28 PROCEDURE — 88173 CYTOPATH EVAL FNA REPORT: CPT | Mod: 26,,, | Performed by: PATHOLOGY

## 2019-05-28 PROCEDURE — 43242 EGD US FINE NEEDLE BX/ASPIR: CPT | Performed by: INTERNAL MEDICINE

## 2019-05-28 RX ORDER — SODIUM CHLORIDE, SODIUM LACTATE, POTASSIUM CHLORIDE, CALCIUM CHLORIDE 600; 310; 30; 20 MG/100ML; MG/100ML; MG/100ML; MG/100ML
INJECTION, SOLUTION INTRAVENOUS ONCE
Status: COMPLETED | OUTPATIENT
Start: 2019-05-28 | End: 2019-05-28

## 2019-05-28 RX ORDER — ETOMIDATE 2 MG/ML
INJECTION INTRAVENOUS
Status: DISCONTINUED | OUTPATIENT
Start: 2019-05-28 | End: 2019-05-28

## 2019-05-28 RX ORDER — BENAZEPRIL HYDROCHLORIDE 20 MG/1
20 TABLET ORAL DAILY
Qty: 90 TABLET | Refills: 3 | Status: SHIPPED | OUTPATIENT
Start: 2019-05-28 | End: 2020-02-17

## 2019-05-28 RX ORDER — SODIUM CHLORIDE, SODIUM LACTATE, POTASSIUM CHLORIDE, CALCIUM CHLORIDE 600; 310; 30; 20 MG/100ML; MG/100ML; MG/100ML; MG/100ML
INJECTION, SOLUTION INTRAVENOUS CONTINUOUS PRN
Status: DISCONTINUED | OUTPATIENT
Start: 2019-05-28 | End: 2019-05-28

## 2019-05-28 RX ORDER — SODIUM CHLORIDE 9 MG/ML
INJECTION, SOLUTION INTRAVENOUS CONTINUOUS
Status: DISCONTINUED | OUTPATIENT
Start: 2019-05-28 | End: 2019-05-28 | Stop reason: HOSPADM

## 2019-05-28 RX ORDER — SODIUM CHLORIDE 0.9 % (FLUSH) 0.9 %
10 SYRINGE (ML) INJECTION
Status: DISCONTINUED | OUTPATIENT
Start: 2019-05-28 | End: 2019-05-28 | Stop reason: HOSPADM

## 2019-05-28 RX ORDER — LINACLOTIDE 72 UG/1
1 CAPSULE, GELATIN COATED ORAL DAILY
COMMUNITY
Start: 2019-05-22 | End: 2019-10-10

## 2019-05-28 RX ORDER — PROPOFOL 10 MG/ML
INJECTION, EMULSION INTRAVENOUS
Status: DISCONTINUED | OUTPATIENT
Start: 2019-05-28 | End: 2019-05-28

## 2019-05-28 RX ORDER — LIDOCAINE HCL/PF 100 MG/5ML
SYRINGE (ML) INTRAVENOUS
Status: DISCONTINUED | OUTPATIENT
Start: 2019-05-28 | End: 2019-05-28

## 2019-05-28 RX ADMIN — SODIUM CHLORIDE, SODIUM LACTATE, POTASSIUM CHLORIDE, AND CALCIUM CHLORIDE: .6; .31; .03; .02 INJECTION, SOLUTION INTRAVENOUS at 08:05

## 2019-05-28 RX ADMIN — LIDOCAINE HYDROCHLORIDE 100 MG: 20 INJECTION, SOLUTION INTRAVENOUS at 09:05

## 2019-05-28 RX ADMIN — PROPOFOL 50 MG: 10 INJECTION, EMULSION INTRAVENOUS at 09:05

## 2019-05-28 RX ADMIN — PROPOFOL 40 MG: 10 INJECTION, EMULSION INTRAVENOUS at 09:05

## 2019-05-28 RX ADMIN — ETOMIDATE 20 MG: 2 INJECTION, SOLUTION INTRAVENOUS at 09:05

## 2019-05-28 NOTE — [employer] PATIENT INSTRUCTIONS
Discharge Summary/Instructions after an Endoscopic Procedure  Patient Name: Rivka Jurado  Patient MRN: [id number]  Patient YOB: 1960  Tuesday, May 28, 2019 Justin Munoz MD  RESTRICTIONS:  During your procedure today, you received medications for sedation.  These   medications may affect your judgment, balance and coordination.  Therefore,   for 24 hours, you have the following restrictions:   - DO NOT drive a car, operate machinery, make legal/financial decisions,   sign important papers or drink alcohol.    ACTIVITY:  Today: no heavy lifting, straining or running due to procedural   sedation/anesthesia.  The following day: return to full activity including work.  DIET:  Eat and drink normally unless instructed otherwise.     TREATMENT FOR COMMON SIDE EFFECTS:  - Mild abdominal pain, nausea, belching, bloating or excessive gas:  rest,   eat lightly and use a heating pad.  - Sore Throat: treat with throat lozenges and/or gargle with warm salt   water.  - Because air was used during the procedure, expelling large amounts of air   from your rectum or belching is normal.  - If a bowel prep was taken, you may not have a bowel movement for 1-3 days.    This is normal.  SYMPTOMS TO WATCH FOR AND REPORT TO YOUR PHYSICIAN:  1. Abdominal pain or bloating, other than gas cramps.  2. Chest pain.  3. Back pain.  4. Signs of infection such as: chills or fever occurring within 24 hours   after the procedure.  5. Rectal bleeding, which would show as bright red, maroon, or black stools.   (A tablespoon of blood from the rectum is not serious, especially if   hemorrhoids are present.)  6. Vomiting.  7. Weakness or dizziness.  GO DIRECTLY TO THE NEAREST EMERGENCY ROOM IF YOU HAVE ANY OF THE FOLLOWING:      Difficulty breathing              Chills and/or fever over 101 F   Persistent vomiting and/or vomiting blood   Severe abdominal pain   Severe chest pain   Black, tarry stools   Bleeding- more than one tablespoon   Any  other symptom or condition that you feel may need urgent attention  Your doctor recommends these additional instructions:  If any biopsies were taken, your doctors clinic will contact you in 1 to 2   weeks with any results.  - Discharge patient to home.   - Low fat diet.   - Continue present medications.   - Await cytology results.   - Return to referring physician as previously scheduled.   - If FNA is nondiagnostic would treat for pancreatic insufficency an repeat   CT with pancreas protocol in 3 months  - If FNA is diagnostic, suggest referral to surgeon.  For questions, problems or results please call your physician Justin Munoz MD at Work:  [phone]  If you have any questions about the above instructions, call the GI   department at [phone] or call the endoscopy unit at [phone]   from 7am until 3 pm.  [facility], EMERGENCY ROOM PHONE NUMBER:   [phone]  IF A COMPLICATION OR EMERGENCY SITUATION ARISES AND YOU ARE UNABLE TO REACH   YOUR PHYSICIAN - GO DIRECTLY TO THE EMERGENCY ROOM.  I have read or have had read to me these discharge instructions for my   procedure and have received a written copy.  I understand these   instructions and will follow-up with my physician if I have any questions.     __________________________________       _____________________________________  Nurse Signature                                          Patient/Designated   Responsible Party Signature  Justin Munoz MD  5/28/2019 9:33:35 AM  This report has been verified and signed electronically.  [employer]

## 2019-05-28 NOTE — H&P
Short Stay Endoscopy History and Physical    PCP - Kennedy Singh MD  [place] - [place]  [address] [facility]  [place] [address]    Procedure - eus  ASA - per anesthesia  Mallampati - per anesthesia  History of Anesthesia problems - no  Family history Anesthesia problems -  no   Plan of anesthesia - General    HPI:  This is a 58 y.o. 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    Surgical History:  has a past surgical history that includes Knee surgery; Rectal surgery; Cholecystectomy; Breast surgery; Tonsillectomy; foot drop surgery; Cardiac catheterization (Right, 03/2018); Hysterectomy; and Endoscopic ultrasound of upper gastrointestinal tract (N/A, 4/9/2019).    Family History: family history includes Cancer in her sister; Colon cancer in her mother; Prostate cancer in her father..    Social History:  reports that she has never smoked. She has never used smokeless tobacco. She reports that she does not drink alcohol or use drugs.    Review of patient's allergies indicates:   Allergen Reactions    Clindamycin Diarrhea and Nausea And Vomiting    Lisinopril Other (See Comments)     Low BP    Morphine Nausea And Vomiting    Stadol [butorphanol tartrate] Hallucinations       Medications:   Medications Prior to  Admission   Medication Sig Dispense Refill Last Dose    benazepril (LOTENSIN) 20 MG tablet TAKE 1 TABLET BY MOUTH EVERY DAY 90 tablet 0 5/27/2019 at Unknown time    clonazePAM (KLONOPIN) 1 MG tablet Take 1 tablet (1 mg total) by mouth every evening. 90 tablet 0 5/27/2019 at Unknown time    esomeprazole (NEXIUM) 40 MG capsule Take 1 capsule (40 mg total) by mouth before breakfast. 90 capsule 3 5/27/2019 at Unknown time    fluconazole (DIFLUCAN) 200 MG Tab Take 1 tablet (200 mg total) by mouth twice a week. (Patient taking differently: Take 200 mg by mouth as needed. ) 24 tablet 0 Past Week at Unknown time    fluticasone (FLONASE) 50 mcg/actuation nasal spray 2 sprays (100 mcg total) by Each Nare route once daily. 3 Bottle 3 5/27/2019 at Unknown time    lipase-protease-amylase 24,000-76,000-120,000 units (CREON) 24,000-76,000 -120,000 unit capsule Take 2 capsules by mouth 3 (three) times daily with meals. 180 capsule 11 5/27/2019 at Unknown time    mupirocin (BACTROBAN) 2 % ointment Apply topically 2 (two) times daily. 30 g 0 Past Week at Unknown time    naproxen (NAPROSYN) 500 MG tablet Take 1 tablet (500 mg total) by mouth 2 (two) times daily with meals. 180 tablet 0 5/27/2019 at Unknown time    nystatin (MYCOSTATIN) powder Apply topically 2 (two) times daily. 60 g 11 Past Week at Unknown time    nystatin-triamcinolone (MYCOLOG II) cream Apply topically 2 (two) times daily. 60 g 3 Past Month at Unknown time    pregabalin (LYRICA) 75 MG capsule Take 1 capsule (75 mg total) by mouth 2 (two) times daily. (Patient taking differently: Take 75 mg by mouth once daily. ) 180 capsule 3 5/27/2019 at Unknown time    promethazine (PHENERGAN) 25 MG tablet Take 1 tablet (25 mg total) by mouth every 6 (six) hours as needed for Nausea. 20 tablet 0 5/27/2019 at Unknown time    tiZANidine (ZANAFLEX) 4 MG tablet Take 2 tablets (8 mg total) by mouth every evening. 180 tablet 3 5/27/2019 at Unknown time    tolterodine  (DETROL LA) 4 MG 24 hr capsule Take 1 capsule (4 mg total) by mouth every evening. 90 capsule 3 5/27/2019 at Unknown time    valACYclovir (VALTREX) 1000 MG tablet Take 1 tablet (1,000 mg total) by mouth once daily. 90 tablet 3 Past Month at Unknown time    cetirizine (ZYRTEC) 10 MG tablet Take 1 tablet (10 mg total) by mouth once daily. 90 tablet 3 Taking    cholestyramine, with sugar, 4 gram Powd Take 4 g by mouth once daily. 378 g 0 Unknown at Unknown time    DULoxetine (CYMBALTA) 30 MG capsule TAKE 2 CAPSULES BY MOUTH EVERY DAY 60 capsule 1     hydrocortisone (ANUSOL-HC) 2.5 % rectal cream Place rectally as needed for Hemorrhoids.   More than a month at Unknown time    metroNIDAZOLE (METROGEL) 0.75 % gel AAA of face bid 45 g 3 More than a month at Unknown time    montelukast (SINGULAIR) 10 mg tablet Take 1 tablet (10 mg total) by mouth once daily. 90 tablet 3 Taking    nitrofurantoin, macrocrystal-monohydrate, (MACROBID) 100 MG capsule Take 1 capsule (100 mg total) by mouth every evening. 90 capsule 3 More than a month at Unknown time    sulfacetamide sodium, acne, 10 % Susp Apply 1 application topically once daily. 118 mL 5 More than a month at Unknown time    triamcinolone acetonide 0.1% (KENALOG) 0.1 % cream Apply topically as needed.   More than a month at Unknown time       Physical Exam:    Vital Signs:   Vitals:    05/28/19 0840   BP: 139/85   Pulse: 85   Resp: 18   Temp: 98.1 °F (36.7 °C)       General Appearance: Well appearing in no acute distress    Labs:  Lab Results   Component Value Date    WBC 7.37 04/02/2019    HGB 12.8 04/02/2019    HCT 42.0 04/02/2019     (H) 04/02/2019    CHOL 179 02/11/2019    TRIG 101 02/11/2019    HDL 45 02/11/2019    ALT 11 04/02/2019    AST 15 04/02/2019     04/02/2019    K 4.5 04/02/2019     04/02/2019    CREATININE 1.1 04/02/2019    BUN 12 04/02/2019    CO2 29 04/02/2019    TSH 2.029 05/22/2019    HGBA1C 5.6 02/11/2019       I have explained  the risks and benefits of this endoscopic procedure to the patient including but not limited to bleeding, inflammation, infection, perforation, and death.      Justin Munoz MD

## 2019-05-28 NOTE — DISCHARGE INSTRUCTIONS
Endoscopic Ultrasound (EUS)    An endoscopic ultrasound (EUS) is a test to look at the inside of your gastrointestinal (GI) tract. It's commonly used to look for cancers or growths in the esophagus, stomach, pancreas, liver, and rectum. It can help to stage cancer (see how advanced a cancer is). EUS may also be used to help diagnose certain diseases or to drain cysts or abscesses.  What is EUS?  EUS shows both ultrasound images and live video of the GI tract. During the test, a flexible tube called an endoscope (scope) is used. At the end of the scope is a tiny video camera and light. The video camera sends live images to a monitor. The scope also contains a very small ultrasound device. This uses sound waves to create images and send them to a monitor.  A needle is passed through the scope. The needle can be used take a small sample of tissue for testing. This is called a biopsy. The needle can be used to take a sample of fluid. This is called fine-needle aspiration (FNA).  Risks and possible complications of EUS  Risks and possible complications include the following:  · Bleeding  · Infection  · A perforation (hole) in the digestive tract   · Risks of sedation or anesthesia   Before the test  Be prepared prior to the test:  · Tell your healthcare provider what medicine you take. This includes vitamins, herbs, and over-the-counter medicine. It also includes any blood thinners, such as warfarin, clopidogrel, ibuprofen, or daily aspirin. Ask your healthcare provider if you need to stop taking some or all of them before the test.  · You may be prescribed antibiotics to take before or after the test. This depends on the area being studied and what is done during the test. These medicines help prevent infection.  · Carefully follow the instructions for preparing for the test to make sure results are accurate. Instructions may include:  ¨ If youre having an EUS of the upper GI tract (esophagus, stomach, duodenum,  pancreas, liver):  § Do not eat or drink for 6 hours before the test.  ¨ If youre having an EUS of the lower GI tract (rectum):  § Before the test, do bowel prep as instructed to clean your rectum of stool. This may involve a clear liquid diet and using a laxative (liquid or pills) the night before the test. Or it may mean doing one or more enemas the morning of the test.  § Do not eat or drink for 6 hours before the test.  · Be sure to arrive on time at the facility. Bring your identification and health insurance card. Leave valuables at home. If you have them, bring X-rays or other test results with you.  Let the healthcare provider know  For your safety, tell the healthcare provider if you:  · Take insulin. Your dose may need to be changed on the day of your test.  · Are allergic to latex.  · Have any other allergies.  · Are taking blood thinners.   During the test  An endoscopic ultrasound usually takes place in a hospital. The procedure itself may take 1 to 2 hours. You will likely go home soon afterward. During the test:  · You lie on your left side on an exam table.  · An intravenous (IV) line will be put into a vein in your arm or hand. This line supplies fluids and medicines. To keep you comfortable during the test, you will be given a sedative medicine. This medicine prevents discomfort and will make you sleepy.  · If you are having an EUS of the upper GI tract, local anesthetic may be sprayed in your throat. This will help you be more comfortable as the healthcare provider inserts the scope. The healthcare provider then gently puts the flexible scope into your mouth or nose and down your throat.  · If youre having an EUS of the lower GI tract, the healthcare provider gently puts the flexible scope into your anus.  · During the test, the scope sends live video and ultrasound images from inside your body to nearby monitors. These are used to examine your GI tract. Specialized procedures, such as drainage,  are done as needed.  · The healthcare provider may discuss the results with you soon after the test. Biopsy results take several  days.  · In most cases, you can go home within a few hours of the test. When you leave the facility, have an adult family member or friend drive you, even if you don't feel that sleepy.  After the test  Here is what to expect after the test:  · You may feel tired from the sedative. This should wear off by the end of the day.  · If you had an upper digestive endoscopy, your throat may feel sore for a day or two. Over-the-counter sore throat lozenges and spray should help.  · You can eat and drink normally as soon as the test is done.  When to call the healthcare provider  Call your healthcare provider if you notice any of the following:  · Fever of 100.4°F (38.0°C) or higher, or as advised by your healthcare provider  · Shortness of breath  · Vomiting blood, blood in stool, or black stools  · Coughing or hoarse voice that wont go away   Date Last Reviewed: 7/1/2016  © 0217-4967 [employer]. [address] [place] [address]. All rights reserved. This information is not intended as a substitute for professional medical care. Always follow your healthcare professional's instructions.

## 2019-05-28 NOTE — TRANSFER OF CARE
"Anesthesia Transfer of Care Note    Patient: Rivka Jurado    Procedure(s) Performed: Procedure(s) (LRB):  ULTRASOUND, UPPER GI TRACT, ENDOSCOPIC (N/A)    Patient location: PACU    Anesthesia Type: MAC    Transport from OR: Transported from OR on room air with adequate spontaneous ventilation    Post pain: adequate analgesia    Post assessment: no apparent anesthetic complications    Post vital signs: stable    Level of consciousness: sedated    Nausea/Vomiting: no nausea/vomiting    Complications: none    Transfer of care protocol was followed      Last vitals:   Visit Vitals  BP (!) 117/58 (BP Location: Right leg, Patient Position: Lying)   Pulse (!) 55   Temp 36.6 °C (97.9 °F) (Tympanic)   Resp 17   Ht 5' 4" (1.626 m)   Wt 132.1 kg (291 lb 3.6 oz)   SpO2 98%   Breastfeeding? No   BMI 49.99 kg/m²     "

## 2019-05-28 NOTE — ANESTHESIA POSTPROCEDURE EVALUATION
Anesthesia Post Evaluation    Patient: Rivka Jurado    Procedure(s) Performed: Procedure(s) (LRB):  ULTRASOUND, UPPER GI TRACT, ENDOSCOPIC (N/A)    Final Anesthesia Type: MAC  Patient location during evaluation: PACU  Patient participation: Yes- Able to Participate  Level of consciousness: awake and alert and oriented  Post-procedure vital signs: reviewed and stable  Pain management: adequate  Airway patency: patent  PONV status at discharge: No PONV  Anesthetic complications: no      Cardiovascular status: blood pressure returned to baseline  Respiratory status: unassisted, room air and spontaneous ventilation  Hydration status: euvolemic  Follow-up not needed.          Vitals Value Taken Time   /58 5/28/2019  9:29 AM   Temp 36.6 °C (97.9 °F) 5/28/2019  9:29 AM   Pulse 55 5/28/2019  9:29 AM   Resp 17 5/28/2019  9:29 AM   SpO2 98 % 5/28/2019  9:29 AM         No case tracking events are documented in the log.      Pain/Nikko Score: Nikko Score: 9 (5/28/2019  9:29 AM)

## 2019-05-28 NOTE — ANESTHESIA PREPROCEDURE EVALUATION
05/28/2019  Rivka Jurado is a 58 y.o., female.    Anesthesia Evaluation    I have reviewed the Patient Summary Reports.    I have reviewed the Nursing Notes.   I have reviewed the Medications.     Review of Systems  Anesthesia Hx:  No problems with previous Anesthesia    Social:  Non-Smoker, No Alcohol Use    Hematology/Oncology:  Hematology Normal        EENT/Dental:   chronic allergic rhinitis   Cardiovascular:   Exercise tolerance: poor Hypertension, well controlled    Pulmonary:   Shortness of breath Snore   Renal/:  Renal/ Normal     Hepatic/GI:   GERD, well controlled 2230 last solid meal/fluid.   Musculoskeletal:   Arthritis     Neurological:   Neuromuscular Disease,   Peripheral Neuropathy    Endocrine:   Pancreatitis, recurrent  Pancreatic mass     Dermatological:  Skin Normal    Psych:   Psychiatric History             Anesthesia Plan  Type of Anesthesia, risks & benefits discussed:  Anesthesia Type:  MAC  Patient's Preference:   Intra-op Monitoring Plan:   Intra-op Monitoring Plan Comments:   Post Op Pain Control Plan:   Post Op Pain Control Plan Comments:   Induction:   IV  Beta Blocker:  Patient is not currently on a Beta-Blocker (No further documentation required).       Informed Consent: Patient understands risks and agrees with Anesthesia plan.  Questions answered. Anesthesia consent signed with patient.  ASA Score: 2     Day of Surgery Review of History & Physical: I have interviewed and examined the patient. I have reviewed the patient's H&P dated: 05/28/19.   H&P update referred to the surgeon.         Ready For Surgery From Anesthesia Perspective.

## 2019-05-28 NOTE — ANESTHESIA RELEASE NOTE
"Anesthesia Release from PACU Note    Patient: Rivka Jurado    Procedure(s) Performed: Procedure(s) (LRB):  ULTRASOUND, UPPER GI TRACT, ENDOSCOPIC (N/A)    Anesthesia type: MAC    Post pain: Adequate analgesia    Post assessment: no apparent anesthetic complications, tolerated procedure well and no evidence of recall    Last Vitals:   Visit Vitals  BP (!) 117/58 (BP Location: Right leg, Patient Position: Lying)   Pulse (!) 55   Temp 36.6 °C (97.9 °F) (Tympanic)   Resp 17   Ht 5' 4" (1.626 m)   Wt 132.1 kg (291 lb 3.6 oz)   SpO2 98%   Breastfeeding? No   BMI 49.99 kg/m²       Post vital signs: stable    Level of consciousness: awake    Nausea/Vomiting: no nausea/no vomiting    Complications: none    Airway Patency: patent    Respiratory: unassisted, spontaneous ventilation, nasal cannula    Cardiovascular: stable    Hydration: euvolemic  "

## 2019-05-29 ENCOUNTER — TELEPHONE (OUTPATIENT)
Dept: GASTROENTEROLOGY | Facility: CLINIC | Age: 59
End: 2019-05-29

## 2019-05-29 RX ORDER — SULFAMETHOXAZOLE AND TRIMETHOPRIM 800; 160 MG/1; MG/1
1 TABLET ORAL 2 TIMES DAILY
Qty: 14 TABLET | Refills: 0 | Status: SHIPPED | OUTPATIENT
Start: 2019-05-29 | End: 2019-06-05

## 2019-05-29 RX ORDER — SULFAMETHOXAZOLE AND TRIMETHOPRIM 800; 160 MG/1; MG/1
1 TABLET ORAL 2 TIMES DAILY
Qty: 14 TABLET | Refills: 0 | Status: SHIPPED | OUTPATIENT
Start: 2019-05-29 | End: 2019-05-29 | Stop reason: SDUPTHER

## 2019-05-29 NOTE — TELEPHONE ENCOUNTER
----- Message from Mishel Chan sent at 5/29/2019  1:53 PM CDT -----  Contact: pt  Type:  Test Results    Who Called: pt   Name of Test (Lab/Mammo/Etc):  Test   Date of Test:  05/28  Ordering Provider:   Where the test was performed: [facility]   Would the patient rather a call back or a response via [facility]? Call back  Best Call Back Number: [phone]  Additional Information:

## 2019-05-29 NOTE — TELEPHONE ENCOUNTER
Prescription refilled  She needs to contact /wait for the gastroenterologist to provide her results of the testing.    It does look like the biopsy results are still pending.

## 2019-05-29 NOTE — TELEPHONE ENCOUNTER
----- Message from Tiffany Maldonado sent at 5/29/2019  7:49 AM CDT -----  ..Type:  Patient Returning Call    Who Called:pt   Who Left Message for Patient:  Does the patient know what this is regarding? MRSA  Would the patient rather a call back or a response via [facility]? Call back   Best Call Back Number:[phone] ( cell )   Additional Information: pt is requesting a call to discuss her  mrsa . Pt states it has some infection coming out. Pt says she will need to get a prescription for an antibiotic

## 2019-05-29 NOTE — TELEPHONE ENCOUNTER
Patient called in regards to having a boil on chin.  Patient states that she was seen in Dermatology for same area a year ago and now have reoccur.  Patient says she was given antibiotics because it was MRSA and she wants to know if you would send in an antibiotic to [facility]

## 2019-05-29 NOTE — TELEPHONE ENCOUNTER
Informed patient pathology report is still pending.  Will call the patient with results when available.

## 2019-05-30 ENCOUNTER — TELEPHONE (OUTPATIENT)
Dept: ENDOSCOPY | Facility: HOSPITAL | Age: 59
End: 2019-05-30

## 2019-05-30 NOTE — TELEPHONE ENCOUNTER
----- Message from Suze Dillon sent at 5/30/2019  3:26 PM CDT -----  Contact: pt   Type:  Test Results    Who Called: ALEXANDER ALICEA   Name of Test (Lab/Mammo/Etc):  Date of Test: 05/28/2019  Ordering Provider: mya  Where the test was performed: hospital  Would the patient rather a call back or a response via [facility]? Call   Best Call Back Number: [phone] (home)    Additional Information:

## 2019-06-03 DIAGNOSIS — F41.9 ANXIETY: ICD-10-CM

## 2019-06-03 RX ORDER — CLONAZEPAM 1 MG/1
TABLET ORAL
Qty: 90 TABLET | Refills: 0 | OUTPATIENT
Start: 2019-06-03

## 2019-06-04 RX ORDER — DULOXETIN HYDROCHLORIDE 30 MG/1
60 CAPSULE, DELAYED RELEASE ORAL DAILY
Qty: 180 CAPSULE | Refills: 3 | Status: SHIPPED | OUTPATIENT
Start: 2019-06-04 | End: 2019-10-10

## 2019-07-08 RX ORDER — NAPROXEN 500 MG/1
500 TABLET ORAL 2 TIMES DAILY WITH MEALS
Qty: 180 TABLET | Refills: 0 | Status: SHIPPED | OUTPATIENT
Start: 2019-07-08 | End: 2019-10-10 | Stop reason: SDUPTHER

## 2019-07-09 ENCOUNTER — TELEPHONE (OUTPATIENT)
Dept: PAIN MEDICINE | Facility: CLINIC | Age: 59
End: 2019-07-09

## 2019-07-09 NOTE — TELEPHONE ENCOUNTER
----- Message from Fady Salmon MD sent at 7/9/2019 12:51 PM CDT -----  Contact: pt  Let's get her to follow up in clinic first, so we can figure out which injection will be the most helpful and then we can get her scheduled within a week.  ----- Message -----  From: Jeff Seo MA  Sent: 7/9/2019  10:43 AM  To: Fady Salmon MD    Patient stated she had procedure in 12/2018 (Lumbar epidural steroid injection and Lumbar facet injection under fluoroscopic guidance) she would like another procedure. She is in a wheelchair has a mass on pancreas and want to know does she have to come in for doctor office visit or just put in orders to schedule procedure. Please advise.         Kelsey      ----- Message -----  From: Violetta Seo  Sent: 7/9/2019  10:23 AM  To: Viky Shrestha Staff    Type:  Sooner Apoointment Request    Caller is requesting a sooner appointment.  Caller declined first available appointment listed below.  Caller will not accept being placed on the waitlist and is requesting a message be sent to doctor.  Name of Caller: the pt  When is the first available appointment? n/a  Symptoms: Back injection  Would the patient rather a call back or a response via [facility]? Call back  Best Call Back Number:[phone]  Additional Information: n/a

## 2019-07-09 NOTE — TELEPHONE ENCOUNTER
Patient will come into clinic for follow up with Dr Salmon. No further concerns. All questions answered.

## 2019-07-24 ENCOUNTER — OFFICE VISIT (OUTPATIENT)
Dept: PAIN MEDICINE | Facility: CLINIC | Age: 59
End: 2019-07-24
Payer: MEDICARE

## 2019-07-24 VITALS
DIASTOLIC BLOOD PRESSURE: 87 MMHG | HEIGHT: 64 IN | BODY MASS INDEX: 49.68 KG/M2 | HEART RATE: 75 BPM | WEIGHT: 291 LBS | SYSTOLIC BLOOD PRESSURE: 196 MMHG

## 2019-07-24 DIAGNOSIS — M47.816 LUMBAR SPONDYLOSIS: Primary | ICD-10-CM

## 2019-07-24 PROCEDURE — 99214 PR OFFICE/OUTPT VISIT, EST, LEVL IV, 30-39 MIN: ICD-10-PCS | Mod: S$GLB,,, | Performed by: PAIN MEDICINE

## 2019-07-24 PROCEDURE — 3008F BODY MASS INDEX DOCD: CPT | Mod: CPTII,S$GLB,, | Performed by: PAIN MEDICINE

## 2019-07-24 PROCEDURE — 3077F SYST BP >= 140 MM HG: CPT | Mod: CPTII,S$GLB,, | Performed by: PAIN MEDICINE

## 2019-07-24 PROCEDURE — 3008F PR BODY MASS INDEX (BMI) DOCUMENTED: ICD-10-PCS | Mod: CPTII,S$GLB,, | Performed by: PAIN MEDICINE

## 2019-07-24 PROCEDURE — 99214 OFFICE O/P EST MOD 30 MIN: CPT | Mod: S$GLB,,, | Performed by: PAIN MEDICINE

## 2019-07-24 PROCEDURE — 99999 PR PBB SHADOW E&M-EST. PATIENT-LVL III: ICD-10-PCS | Mod: PBBFAC,,, | Performed by: PAIN MEDICINE

## 2019-07-24 PROCEDURE — 3077F PR MOST RECENT SYSTOLIC BLOOD PRESSURE >= 140 MM HG: ICD-10-PCS | Mod: CPTII,S$GLB,, | Performed by: PAIN MEDICINE

## 2019-07-24 PROCEDURE — 3079F DIAST BP 80-89 MM HG: CPT | Mod: CPTII,S$GLB,, | Performed by: PAIN MEDICINE

## 2019-07-24 PROCEDURE — 3079F PR MOST RECENT DIASTOLIC BLOOD PRESSURE 80-89 MM HG: ICD-10-PCS | Mod: CPTII,S$GLB,, | Performed by: PAIN MEDICINE

## 2019-07-24 PROCEDURE — 99999 PR PBB SHADOW E&M-EST. PATIENT-LVL III: CPT | Mod: PBBFAC,,, | Performed by: PAIN MEDICINE

## 2019-07-24 NOTE — PATIENT INSTRUCTIONS
-will schedule for bilateral lumbar medial branch blocks targeting the L4-5 and L5/S1 facet joints using steroid  -continue physical therapy exercises at home as tolerated  -follow up in clinic in 8 weeks

## 2019-07-24 NOTE — H&P (VIEW-ONLY)
Chief Pain Complaint:  Low-back Pain    History of Present Illness:   Ms. Jurado returns to clinic for follow-up.  She underwent bilateral lumbar medial branch blocks with steroid back in December 2018 and reports excellent symptomatic relief for approximately last 6 months however symptoms have started to return.  Today she rates her pain as a 5/10 describes a constant aching sensation in her lumbar spine.  She has been evaluated secondary to having a pancreatic mass identified on previous scans however she reports most recent scan does not show a mass at this time which she is very relieved bowel.  She finds that her lumbar pain is worse in the mornings when she 1st wakes up and improved somewhat throughout the day with activity.  She reports having C diff currently and left undergo oral vancomycin therapy over the next couple of weeks.  It is unclear this time how long she will have to take this medication but she would like to have repeat lumbar medial branch blocks with steroid performed we will wait until she has completed her oral antibiotics before proceeding.    Initial HPI:  This patient is a 58 y.o. female who presents today complaining of the above noted pain/s. The patient describes the pain as follows. Ms. 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  She has a history of low back pain which has been present for many years.  She recently moved [place] 1 [place] ago from [place] where she was seen by several different pain physicians including Dr. Micah Reyes and Dr. Salomon performed several injections in her lumbar spine which have provided 6 -9 months of pain relief with each injection. Upon  [place]riving in [place] she sought to establish care with Dr. Lara at [facility] however after her 1st visitation she decided to seek treatment elsewhere.  After she had the unfortunate incident with the dislocation of the knee replacement she was bed-bound for approximately 24 months which ultimately led to a lot of her increased low back pain. She denies having radiation into her lower extremities and denies having weakness in her legs however she does have numbness in the right lower extremity secondary to numerous surgeries on the foot and ankle.  She denies having difficulty with bowel bladder.  She has taken Lyrica in the past which has provided some benefit however insurance no longer covers this medication and she has been taking naproxen which provides some benefit.    Previous Therapy:  Medications:  Lyrica, naproxen, Cymbalta  Injections:  Numerous lumbar injections in the past, unsure if these are epidural steroid injections or medial branch blocks; lumbar medial branch blocks with steroid  Surgeries:  Numerous right lower extremity surgeries  Physical Therapy:  Has participated in physical therapy in the past    Past Surgical History:   Procedure Laterality Date    BREAST SURGERY      breast lump removed    CARDIAC CATHETERIZATION Right 03/2018    wrist    CHOLECYSTECTOMY      foot drop surgery      HYSTERECTOMY      KNEE SURGERY      13     RECTAL SURGERY      TONSILLECTOMY      ULTRASOUND, UPPER GI TRACT, ENDOSCOPIC N/A 5/28/2019    Performed by [facility] at [facility]    ULTRASOUND, UPPER GI TRACT, ENDOSCOPIC N/A 4/9/2019    Performed by Demario Menjivar MD at [facility]     Imaging / Labs / Studies (reviewed on 7/24/2019):    Review of Systems:  CONSTITUTIONAL: patient denies any fever, chills, or weight loss  SKIN: patient denies any rash or itching  RESPIRATORY: patient denies having any shortness of breath  GASTROINTESTINAL: patient denies having any diarrhea,  "constipation, or bowel incontinence  GENITOURINARY: patient denies having any abnormal bladder function    MUSCULOSKELETAL:  - patient complains of the above noted pain/s (see chief pain complaint)    NEUROLOGICAL:   - pain as above  - strength in Lower extremities is decreased, on the RIGHT  - sensation in Lower extremities is abnormal, on the RIGHT  - patient denies any loss of bowel or bladder control      PSYCHIATRIC: patient denies any change in mood    Other:  All other systems reviewed and are negative    Physical Exam:  BP (!) 196/87 (BP Location: Left arm, Patient Position: Sitting, BP Method: Large (Automatic))   Pulse 75   Ht 5' 4" (1.626 m)   Wt 132 kg (291 lb)   BMI 49.95 kg/m²  (reviewed on 7/24/2019)\  General:  Alert and oriented, No acute distress.    HEENT:       EOMI.  Normocephalic, atraumatic.   Cardiovascular:  Regular rate.    Gastrointestinal:  Soft.    Respiratory:  Respirations are non-labored.    Cervical Spine:  No masses or atrophy,  Thoracic Spine:  Palpation normal.    Lumbar Spine:  No masses or atrophy,         Range of motion - limited Flexion, Extension,  Right Lat Bending,  Left Lat Bending        Increased pain with -left and right lateral bending, extension        Palpation - tender palpation over bilateral lower lumbar facets        PSIS tenderness - negative bilaterally             SLR - negative for radicular symptoms  Motor Exam:      Strength:  Rate on 1-5 scale Right Left   L2- hip flexion  5 5   L3- knee extension  5 5   L4- ankle dorsiflexion 3 5   L5- great toe extension 3 5   S1- ankle plantarflexion 3 5     Sensory Exam:  Full and equal sensation to light touch throughout.    Reflexes   Left DTR's     Knee.   2  Ankle.   1  Right DTR's    Knee.   2  Ankle.   1  Neurologic:  Cranial Nerves II-XII are grossly intact.    Pathologic reflexes:            Clonus - Neg   Psychiatric:  Cooperative.    Gait:  Very antalgic, patient has AFO    Assessment  Lumbar " Spondylosis  Lumbar Degenerative Disc Disease    1. 58 y.o. year old patient with PMH of   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/2017    Pancreatitis [facility]/18/2019    [facility]    Recurrent UTI 10/13/2017    Right foot drop 10/13/2017    Urinary incontinence 10/13/2017    Weakness of right lower extremity 10/13/2017      presenting with pain located lumbar spine  2. Pain Generators / Etiology :  Lumbar degenerative disc disease, lumbar spondylosis  3. Failed Meds (E- Effective, NE- Not Effective) Lyrica-effective, naproxen-effective, Cymbalta-effective  4. Physical Therapy - complete physical therapy for greater than 6 weeks in the past  5. Psychological comorbidities -  anxiety  6. Anticoagulants / Antiplatelets:  None     PLAN:  1. Medications continue taking all medications as prescribed  2. PT - she has been physical therapy numerous times in the past however this caused her symptoms to increase and worsen  3. Psychological - continue Klonopin for anxiety  4. Labs - obtain  none; reviewed labs from 10/10/2017, creatinine 1.0  5. Imaging - obtain  none;  6. Interventions - schedule bilateral L3-5 medial branches targeting the L4/L5 and L5/S1 facet joints using steroids  7. Referrals - none  8. Records - none  9. Follow up visit - follow up in clinic in 8 weeks  10. Patient Questions - answered all patient's questions regarding diagnosis, therapy, treatment    LUPE Salmon MD  Interventional Pain  [facility] [place]

## 2019-07-24 NOTE — PROGRESS NOTES
Chief Pain Complaint:  Low-back Pain    History of Present Illness:   Ms. Jurado returns to clinic for follow-up.  She underwent bilateral lumbar medial branch blocks with steroid back in December 2018 and reports excellent symptomatic relief for approximately last 6 months however symptoms have started to return.  Today she rates her pain as a 5/10 describes a constant aching sensation in her lumbar spine.  She has been evaluated secondary to having a pancreatic mass identified on previous scans however she reports most recent scan does not show a mass at this time which she is very relieved bowel.  She finds that her lumbar pain is worse in the mornings when she 1st wakes up and improved somewhat throughout the day with activity.  She reports having C diff currently and left undergo oral vancomycin therapy over the next couple of weeks.  It is unclear this time how long she will have to take this medication but she would like to have repeat lumbar medial branch blocks with steroid performed we will wait until she has completed her oral antibiotics before proceeding.    Initial HPI:  This patient is a 58 y.o. female who presents today complaining of the above noted pain/s. The patient describes the pain as follows. Ms. 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  She has a history of low back pain which has been present for many years.  [place] from [place] where she was seen by several different pain physicians including Dr. Micah Reyes and Dr. Salomon performed several injections in her lumbar spine which have provided 6 -9 months of pain relief with each injection. [place]  [place] she sought to establish care with Dr. Lara at [facility] however after her 1st visitation she decided to seek treatment elsewhere.  After she had the unfortunate incident with the dislocation of the knee replacement she was bed-bound for approximately 24 months which ultimately led to a lot of her increased low back pain. She denies having radiation into her lower extremities and denies having weakness in her legs however she does have numbness in the right lower extremity secondary to numerous surgeries on the foot and ankle.  She denies having difficulty with bowel bladder.  She has taken Lyrica in the past which has provided some benefit however insurance no longer covers this medication and she has been taking naproxen which provides some benefit.    Previous Therapy:  Medications:  Lyrica, naproxen, Cymbalta  Injections:  Numerous lumbar injections in the past, unsure if these are epidural steroid injections or medial branch blocks; lumbar medial branch blocks with steroid  Surgeries:  Numerous right lower extremity surgeries  Physical Therapy:  Has participated in physical therapy in the past    Past Surgical History:   Procedure Laterality Date    BREAST SURGERY      breast lump removed    CARDIAC CATHETERIZATION Right 03/2018    wrist    CHOLECYSTECTOMY      foot drop surgery      HYSTERECTOMY      KNEE SURGERY      13     RECTAL SURGERY      TONSILLECTOMY      ULTRASOUND, UPPER GI TRACT, ENDOSCOPIC N/A 5/28/2019    Performed by Justin Munoz [facility] at [facility]    ULTRASOUND, UPPER GI TRACT, ENDOSCOPIC N/A 4/9/2019    Performed by Demario Menjivar MD at [facility] ENDO     Imaging / Labs / Studies (reviewed on 7/24/2019):    Review of Systems:  CONSTITUTIONAL: patient denies any fever, chills, or weight loss  SKIN: patient denies any rash or itching  RESPIRATORY: patient denies having any shortness of breath  GASTROINTESTINAL: patient denies having any diarrhea,  "constipation, or bowel incontinence  GENITOURINARY: patient denies having any abnormal bladder function    MUSCULOSKELETAL:  - patient complains of the above noted pain/s (see chief pain complaint)    NEUROLOGICAL:   - pain as above  - strength in Lower extremities is decreased, on the RIGHT  - sensation in Lower extremities is abnormal, on the RIGHT  - patient denies any loss of bowel or bladder control      PSYCHIATRIC: patient denies any change in mood    Other:  All other systems reviewed and are negative    Physical Exam:  BP (!) 196/87 (BP Location: Left arm, Patient Position: Sitting, BP Method: Large (Automatic))   Pulse 75   Ht 5' 4" (1.626 m)   Wt 132 kg (291 lb)   BMI 49.95 kg/m²  (reviewed on 7/24/2019)\  General:  Alert and oriented, No acute distress.    HEENT:       EOMI.  Normocephalic, atraumatic.   Cardiovascular:  Regular rate.    Gastrointestinal:  Soft.    Respiratory:  Respirations are non-labored.    Cervical Spine:  No masses or atrophy,  Thoracic Spine:  Palpation normal.    Lumbar Spine:  No masses or atrophy,         Range of motion - limited Flexion, Extension,  Right Lat Bending,  Left Lat Bending        Increased pain with -left and right lateral bending, extension        Palpation - tender palpation over bilateral lower lumbar facets        PSIS tenderness - negative bilaterally             SLR - negative for radicular symptoms  Motor Exam:      Strength:  Rate on 1-5 scale Right Left   L2- hip flexion  5 5   L3- knee extension  5 5   L4- ankle dorsiflexion 3 5   L5- great toe extension 3 5   S1- ankle plantarflexion 3 5     Sensory Exam:  Full and equal sensation to light touch throughout.    Reflexes   Left DTR's     Knee.   2  Ankle.   1  Right DTR's    Knee.   2  Ankle.   1  Neurologic:  Cranial Nerves II-XII are grossly intact.    Pathologic reflexes:            Clonus - Neg   Psychiatric:  Cooperative.    Gait:  Very antalgic, patient has AFO    Assessment  Lumbar " Spondylosis  Lumbar Degenerative Disc Disease    1. 58 y.o. 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 Pain Generators / Etiology :  Lumbar degenerative disc disease, lumbar spondylosis  3. Failed Meds (E- Effective, NE- Not Effective) Lyrica-effective, naproxen-effective, Cymbalta-effective  4. Physical Therapy - complete physical therapy for greater than 6 weeks in the past  5. Psychological comorbidities -  anxiety  6. Anticoagulants / Antiplatelets:  None     PLAN:  1. Medications continue taking all medications as prescribed  2. PT - she has been physical therapy numerous times in the past however this caused her symptoms to increase and worsen  3. Psychological - continue Klonopin for anxiety  4. Labs - obtain  none; reviewed labs from 10/10/2017, creatinine 1.0  5. Imaging - obtain  none;  6. Interventions - schedule bilateral L3-5 medial branches targeting the L4/L5 and L5/S1 facet joints using steroids  7. Referrals - none  8. Records - none  9. Follow up visit - follow up in clinic in 8 weeks  10. Patient Questions - answered all patient's questions regarding diagnosis, therapy, treatment    LUPE Salmon MD  Interventional Pain  [facility] [place]

## 2019-07-29 ENCOUNTER — PATIENT OUTREACH (OUTPATIENT)
Dept: ADMINISTRATIVE | Facility: HOSPITAL | Age: 59
End: 2019-07-29

## 2019-07-29 DIAGNOSIS — F41.9 ANXIETY: ICD-10-CM

## 2019-07-29 RX ORDER — CLONAZEPAM 1 MG/1
1 TABLET ORAL NIGHTLY
Qty: 90 TABLET | Refills: 0 | Status: SHIPPED | OUTPATIENT
Start: 2019-07-29 | End: 2019-10-10 | Stop reason: SDUPTHER

## 2019-07-29 NOTE — TELEPHONE ENCOUNTER
----- Message from Shayy Monge sent at 7/29/2019  8:32 AM CDT -----  Contact: patient   Type:  RX Refill Request    Who Called: Rivka Jurado   Refill or New Rx:refill  RX Name and Strength:clonazePAM (KLONOPIN) 1 MG tablet  How is the patient currently taking it? (ex. 1XDay):1xday  Is this a 30 day or 90 day RX:30 day  Preferred Pharmacy with phone number:  [facility] [place] - [address] [address]  [address] [address]  Phone: [phone] Fax: [phone]  Local or Mail Order:local  Ordering Provider:Dr. Singh   Would the patient rather a call back or a response via [facility]? call  Best Call Back Number:[phone]  Additional Information: Patient is out of medication

## 2019-08-05 ENCOUNTER — TELEPHONE (OUTPATIENT)
Dept: PAIN MEDICINE | Facility: CLINIC | Age: 59
End: 2019-08-05

## 2019-08-05 NOTE — TELEPHONE ENCOUNTER
----- Message from Lizeth Antonio sent at 8/5/2019  9:30 AM CDT -----  Contact: pt   Call pt in regards to procedure that is schedule Friday.    .[phone] (uhhv)

## 2019-08-05 NOTE — TELEPHONE ENCOUNTER
Patient stated she wants to know if she is in the morning for procedure and needs to know the exact place to park for procedure. No further concerns.

## 2019-08-09 ENCOUNTER — HOSPITAL ENCOUNTER (OUTPATIENT)
Facility: HOSPITAL | Age: 59
Discharge: HOME | End: 2019-08-09
Attending: PAIN MEDICINE | Admitting: PAIN MEDICINE
Payer: MEDICARE

## 2019-08-09 VITALS
WEIGHT: 291.88 LBS | OXYGEN SATURATION: 100 % | HEIGHT: 64 IN | SYSTOLIC BLOOD PRESSURE: 161 MMHG | HEART RATE: 78 BPM | RESPIRATION RATE: 17 BRPM | DIASTOLIC BLOOD PRESSURE: 79 MMHG | BODY MASS INDEX: 49.83 KG/M2 | TEMPERATURE: 98 F

## 2019-08-09 DIAGNOSIS — M47.816 LUMBAR SPONDYLOSIS: Primary | ICD-10-CM

## 2019-08-09 PROCEDURE — 64494 INJ PARAVERT F JNT L/S 2 LEV: CPT | Mod: 50 | Performed by: PAIN MEDICINE

## 2019-08-09 PROCEDURE — 64494 INJ PARAVERT F JNT L/S 2 LEV: CPT | Mod: 50,,, | Performed by: PAIN MEDICINE

## 2019-08-09 PROCEDURE — 99152 MOD SED SAME PHYS/QHP 5/>YRS: CPT | Mod: ,,, | Performed by: PAIN MEDICINE

## 2019-08-09 PROCEDURE — 25000003 PHARM REV CODE 250: Performed by: PAIN MEDICINE

## 2019-08-09 PROCEDURE — 63600175 PHARM REV CODE 636 W HCPCS: Performed by: PAIN MEDICINE

## 2019-08-09 PROCEDURE — 64493 INJ PARAVERT F JNT L/S 1 LEV: CPT | Mod: 50,,, | Performed by: PAIN MEDICINE

## 2019-08-09 PROCEDURE — 99152 PR MOD CONSCIOUS SEDATION, SAME PHYS, 5+ YRS, FIRST 15 MIN: ICD-10-PCS | Mod: ,,, | Performed by: PAIN MEDICINE

## 2019-08-09 PROCEDURE — 64493 PR INJ DX/THER AGNT PARAVERT FACET JOINT,IMG GUIDE,LUMBAR/SAC,1ST LVL: ICD-10-PCS | Mod: 50,,, | Performed by: PAIN MEDICINE

## 2019-08-09 PROCEDURE — 64493 INJ PARAVERT F JNT L/S 1 LEV: CPT | Mod: 50 | Performed by: PAIN MEDICINE

## 2019-08-09 PROCEDURE — 64495 INJ PARAVERT F JNT L/S 3 LEV: CPT | Mod: 50 | Performed by: PAIN MEDICINE

## 2019-08-09 PROCEDURE — 64494 PR INJ DX/THER AGNT PARAVERT FACET JOINT,IMG GUIDE,LUMBAR/SAC, 2ND LEVEL: ICD-10-PCS | Mod: 50,,, | Performed by: PAIN MEDICINE

## 2019-08-09 RX ORDER — LIDOCAINE HYDROCHLORIDE 20 MG/ML
INJECTION, SOLUTION EPIDURAL; INFILTRATION; INTRACAUDAL; PERINEURAL
Status: DISCONTINUED | OUTPATIENT
Start: 2019-08-09 | End: 2019-08-09 | Stop reason: HOSPADM

## 2019-08-09 RX ORDER — METHYLPREDNISOLONE ACETATE 40 MG/ML
INJECTION, SUSPENSION INTRA-ARTICULAR; INTRALESIONAL; INTRAMUSCULAR; SOFT TISSUE
Status: DISCONTINUED | OUTPATIENT
Start: 2019-08-09 | End: 2019-08-09 | Stop reason: HOSPADM

## 2019-08-09 RX ORDER — MIDAZOLAM HYDROCHLORIDE 1 MG/ML
INJECTION, SOLUTION INTRAMUSCULAR; INTRAVENOUS
Status: DISCONTINUED | OUTPATIENT
Start: 2019-08-09 | End: 2019-08-09 | Stop reason: HOSPADM

## 2019-08-09 RX ORDER — FENTANYL CITRATE 50 UG/ML
INJECTION, SOLUTION INTRAMUSCULAR; INTRAVENOUS
Status: DISCONTINUED | OUTPATIENT
Start: 2019-08-09 | End: 2019-08-09 | Stop reason: HOSPADM

## 2019-08-09 NOTE — DISCHARGE SUMMARY
[facility]  Short Stay  Discharge Summary    Admit Date: 8/9/2019    Discharge Date and Time: 8/9/2019  9:50 AM      Discharge Attending Physician: LUPE Salmon MD     Hospital Course (synopsis of major diagnoses, care, treatment, and services provided during the course of the hospital stay): Patient was admitted to Pre-op where informed consent was signed.  The patient was then taken to the procedure suite where the procedure was performed.  The patient was then return to the Pre-Op area and discharge was performed.     Final Diagnoses:    Principal Problem: <principal problem not specified>   Secondary Diagnoses:   Active Hospital Problems    Diagnosis  POA    Lumbar spondylosis [M47.816]  Yes      Resolved Hospital Problems   No resolved problems to display.       Discharged Condition: good    Disposition: Home or Self Care    Follow up/Patient Instructions:    Medications:  Reconciled Home Medications:      Medication List      CHANGE how you take these medications    fluconazole 200 MG Tab  Commonly known as:  DIFLUCAN  Take 1 tablet (200 mg total) by mouth twice a week.  What changed:    · when to take this  · reasons to take this     pregabalin 75 MG capsule  Commonly known as:  LYRICA  Take 1 capsule (75 mg total) by mouth 2 (two) times daily.  What changed:  when to take this        CONTINUE taking these medications    benazepril 20 MG tablet  Commonly known as:  LOTENSIN  Take 1 tablet (20 mg total) by mouth once daily.     cholestyramine (with sugar) 4 gram Powd  Take 4 g by mouth once daily.     clonazePAM 1 MG tablet  Commonly known as:  KLONOPIN  Take 1 tablet (1 mg total) by mouth every evening.     esomeprazole 40 MG capsule  Commonly known as:  NEXIUM  Take 1 capsule (40 mg total) by mouth before breakfast.     fluticasone propionate 50 mcg/actuation nasal spray  Commonly known as:  FLONASE  2 sprays (100 mcg total) by Each Nare route once daily.     hydrocortisone 2.5 % rectal  cream  Commonly known as:  ANUSOL-HC  Place rectally as needed for Hemorrhoids.     lipase-protease-amylase 24,000-76,000-120,000 units 24,000-76,000 -120,000 unit capsule  Commonly known as:  CREON  Take 2 capsules by mouth 3 (three) times daily with meals.     metroNIDAZOLE 0.75 % gel  Commonly known as:  METROGEL  AAA of face bid     mupirocin 2 % ointment  Commonly known as:  BACTROBAN  Apply topically 2 (two) times daily.     naproxen 500 MG tablet  Commonly known as:  NAPROSYN  Take 1 tablet (500 mg total) by mouth 2 (two) times daily with meals.     nitrofurantoin (macrocrystal-monohydrate) 100 MG capsule  Commonly known as:  MACROBID  Take 1 capsule (100 mg total) by mouth every evening.     nystatin powder  Commonly known as:  MYCOSTATIN  Apply topically 2 (two) times daily.     nystatin-triamcinolone cream  Commonly known as:  MYCOLOG II  Apply topically 2 (two) times daily.     promethazine 25 MG tablet  Commonly known as:  PHENERGAN  Take 1 tablet (25 mg total) by mouth every 6 (six) hours as needed for Nausea.     sulfacetamide sodium (acne) 10 % Susp  Apply 1 application topically once daily.     tiZANidine 4 MG tablet  Commonly known as:  ZANAFLEX  Take 2 tablets (8 mg total) by mouth every evening.     tolterodine 4 MG 24 hr capsule  Commonly known as:  DETROL LA  Take 1 capsule (4 mg total) by mouth every evening.     triamcinolone acetonide 0.1% 0.1 % cream  Commonly known as:  KENALOG  Apply topically as needed.     valACYclovir 1000 MG tablet  Commonly known as:  VALTREX  Take 1 tablet (1,000 mg total) by mouth once daily.        ASK your doctor about these medications    cetirizine 10 MG tablet  Commonly known as:  ZYRTEC  Take 1 tablet (10 mg total) by mouth once daily.     DULoxetine 30 MG capsule  Commonly known as:  CYMBALTA  Take 2 capsules (60 mg total) by mouth once daily.     LINZESS 72 mcg Cap capsule  Generic drug:  linaCLOtide  Take 1 capsule by mouth once daily.     montelukast 10 mg  tablet  Commonly known as:  SINGULAIR  Take 1 tablet (10 mg total) by mouth once daily.          Discharge Procedure Orders   Diet general     Call MD for:  severe uncontrolled pain     Call MD for:  difficulty breathing, headache or visual disturbances     Call MD for:  redness, tenderness, or signs of infection (pain, swelling, redness, odor or green/yellow discharge around incision site)     Activity as tolerated

## 2019-08-09 NOTE — DISCHARGE INSTRUCTIONS

## 2019-08-09 NOTE — PLAN OF CARE
Discussed discharge teaching with pt. And zoya'. They both Verbalized understanding. No needs voiced, pt. Sitting up in bed and has voiced no needs.

## 2019-08-09 NOTE — OP NOTE
Procedure: Lumbar Medial Branch Block under Fluoroscopic Guidance    Side: bilateral     Level:  Sacral ala (Corresponding to the L5 dorsal ramus), L5 transverse process (Corresponding to the L4 medial branch) and L4 transverse process (Corresponding to the L3 medial branch)     PROCEDURE DATE: 8/9/2019    Pre-operative Diagnosis: Lumbar Spondylosis  Post-operative Diagnosis: Lumbar Spondylosis    Provider: LUPE Salmon MD  Assistant(s): none    Anesthesia: Local, IV Sedation    >> 1 mg of VERSED    >> 25 mcg of FENTANYL     Indication: Low back pain without radiculopathy. Symptoms unresponsive to conservative treatments. Fluoroscopy was used to optimize visualization of needle placement and to maximize safety.     Procedure Description / Technique:  The patient was seen and identified in the preoperative area. Risks, benefits, complications, and alternatives were discussed with the patient. The patient agreed to proceed with the procedure and signed the consent. The site and side of the procedure was identified and marked. An iv was started.     The patient was taken to the procedural suite and positioned in prone orientation on the procedure table. A pillow was placed under the abdomen to reduce lumbar lordosis. A time out was performed. The procedure, site, side, and allergies were stated and agreed to by all present. The lumbosacral area was widely prepped with ChloraPrep. The procedural site was draped in usual sterile fashion. Vital signs were closely monitored throughout this procedure. Conscious sedation was used for this procedure to decrease patient anxiety.    The Left sacral ala and superior articular process was identified and marked on AP fluoroscopic imaging. Subcutaneous tissues were localized using 1% PF Lidocaine to improve patient comfort. A 22 gauge 5 inch spinal needle was advance until the needle rested on OS at the interface of the sacral ala and the base of the sacral superior articular  process. After negative aspiration, 1ml of a 2% lidocaine and 80mg depomedrol solution was injected. No pain or paresthesia was noted on injection. After bilateral injection, the fluoroscope was rotated into the oblique view and the spinal needle was advanced towards the eye of the ayla dog until os was contacted. 1ml of 2% lidocaine was injected after negative aspiration. No pain or paresthesia was noted. This technique was repeated at each of the above noted levels. The spinal needle was removed intact following injection at each targeted site. The stylet was replaced prior to needle removal at each site.    Description of Findings: Not applicable    Prosthetic devices, grafts, tissues, or devices implanted: None    Specimen Removed: No    ESTIMATED BLOOD LOSS: minimal    COMPLICATIONS: None    DISPOSITION / PLANS: The patient was transferred to the recovery area in a stable condition for observation. The patient was reexamined prior to discharge. There was no evidence of acute neurologic injury following the procedure.  Patient was discharged from the recovery room after meeting discharge criteria. Home discharge instructions were given to the patient by the staff.

## 2019-08-09 NOTE — PLAN OF CARE
Dr Salmon aware pt finished TX for C Diff yesterday 8/8/19. Pt states she is no having any symptoms and stool is formed. Last bowel movement 8/8/19.

## 2019-08-12 ENCOUNTER — TELEPHONE (OUTPATIENT)
Dept: PAIN MEDICINE | Facility: CLINIC | Age: 59
End: 2019-08-12

## 2019-08-12 ENCOUNTER — TELEPHONE (OUTPATIENT)
Dept: INTERNAL MEDICINE | Facility: CLINIC | Age: 59
End: 2019-08-12

## 2019-08-12 NOTE — TELEPHONE ENCOUNTER
----- Message from Aubrie Jeffers sent at 8/12/2019 10:09 AM CDT -----  Contact: Pt  Pt is calling to let the staff know that she may not be able to make appt her wheel chair went  and she may be able to keep the appt.

## 2019-08-12 NOTE — TELEPHONE ENCOUNTER
Patient stated she received 50% relief at this time. She has minor pain and soreness. She stated she is taking OTC medicine to help relief the pain. No further concerns. All questions answered.

## 2019-08-16 DIAGNOSIS — J30.0 VASOMOTOR RHINITIS: ICD-10-CM

## 2019-08-19 RX ORDER — MONTELUKAST SODIUM 10 MG/1
10 TABLET ORAL DAILY
Qty: 90 TABLET | Refills: 3 | Status: SHIPPED | OUTPATIENT
Start: 2019-08-19 | End: 2019-10-10

## 2019-09-03 ENCOUNTER — TELEPHONE (OUTPATIENT)
Dept: INTERNAL MEDICINE | Facility: CLINIC | Age: 59
End: 2019-09-03

## 2019-09-03 DIAGNOSIS — J34.0 NASAL ABSCESS: Primary | ICD-10-CM

## 2019-09-03 RX ORDER — SULFAMETHOXAZOLE AND TRIMETHOPRIM 800; 160 MG/1; MG/1
1 TABLET ORAL 2 TIMES DAILY
Qty: 10 TABLET | Refills: 0 | Status: SHIPPED | OUTPATIENT
Start: 2019-09-03 | End: 2019-09-08

## 2019-09-03 RX ORDER — MUPIROCIN 20 MG/G
OINTMENT TOPICAL 2 TIMES DAILY
Qty: 30 G | Refills: 0 | Status: SHIPPED | OUTPATIENT
Start: 2019-09-03 | End: 2019-12-13 | Stop reason: SDUPTHER

## 2019-09-03 NOTE — TELEPHONE ENCOUNTER
----- Message from Ann Lewis sent at 9/3/2019  9:12 AM CDT -----  Contact: self  mrsa infection in right nostril (no fever yet), please send in antibiotics today..[phone]     .  [facility] [place] [facility] [address] [facility] [address] [facility]  [address] [facility] [address] [place], [place] [address] [place] [address]  Phone: [phone] Fax: [phone]

## 2019-10-09 ENCOUNTER — OFFICE VISIT (OUTPATIENT)
Dept: PAIN MEDICINE | Facility: CLINIC | Age: 59
End: 2019-10-09
Payer: MEDICARE

## 2019-10-09 VITALS
WEIGHT: 293 LBS | SYSTOLIC BLOOD PRESSURE: 169 MMHG | HEIGHT: 64 IN | BODY MASS INDEX: 50.02 KG/M2 | HEART RATE: 75 BPM | DIASTOLIC BLOOD PRESSURE: 94 MMHG

## 2019-10-09 DIAGNOSIS — E66.9 OBESITY, UNSPECIFIED CLASSIFICATION, UNSPECIFIED OBESITY TYPE, UNSPECIFIED WHETHER SERIOUS COMORBIDITY PRESENT: ICD-10-CM

## 2019-10-09 DIAGNOSIS — M47.816 LUMBAR SPONDYLOSIS: Primary | ICD-10-CM

## 2019-10-09 PROCEDURE — 99999 PR PBB SHADOW E&M-EST. PATIENT-LVL IV: CPT | Mod: PBBFAC,,, | Performed by: PAIN MEDICINE

## 2019-10-09 PROCEDURE — 3008F BODY MASS INDEX DOCD: CPT | Mod: CPTII,S$GLB,, | Performed by: PAIN MEDICINE

## 2019-10-09 PROCEDURE — 99999 PR PBB SHADOW E&M-EST. PATIENT-LVL IV: ICD-10-PCS | Mod: PBBFAC,,, | Performed by: PAIN MEDICINE

## 2019-10-09 PROCEDURE — 3080F DIAST BP >= 90 MM HG: CPT | Mod: CPTII,S$GLB,, | Performed by: PAIN MEDICINE

## 2019-10-09 PROCEDURE — 3008F PR BODY MASS INDEX (BMI) DOCUMENTED: ICD-10-PCS | Mod: CPTII,S$GLB,, | Performed by: PAIN MEDICINE

## 2019-10-09 PROCEDURE — 3077F PR MOST RECENT SYSTOLIC BLOOD PRESSURE >= 140 MM HG: ICD-10-PCS | Mod: CPTII,S$GLB,, | Performed by: PAIN MEDICINE

## 2019-10-09 PROCEDURE — 99214 PR OFFICE/OUTPT VISIT, EST, LEVL IV, 30-39 MIN: ICD-10-PCS | Mod: S$GLB,,, | Performed by: PAIN MEDICINE

## 2019-10-09 PROCEDURE — 3077F SYST BP >= 140 MM HG: CPT | Mod: CPTII,S$GLB,, | Performed by: PAIN MEDICINE

## 2019-10-09 PROCEDURE — 99214 OFFICE O/P EST MOD 30 MIN: CPT | Mod: S$GLB,,, | Performed by: PAIN MEDICINE

## 2019-10-09 PROCEDURE — 3080F PR MOST RECENT DIASTOLIC BLOOD PRESSURE >= 90 MM HG: ICD-10-PCS | Mod: CPTII,S$GLB,, | Performed by: PAIN MEDICINE

## 2019-10-09 NOTE — PATIENT INSTRUCTIONS
-will schedule for bilateral L3-5 medial branches blocks targeting the L4/5 and L5/S1 facet joints with steroid-continue Lyrica if this prescribed  -follow up in clinic in 8 weeks     Pain Management Pre-Procedure Instructions  (also available in your [employer] account)    Patient Name:___Rivka Jurado____MRN: [id number] you are scheduled to have the following procedure:__ Lumbar Medial Branch Block  _with______L. Bo Salmon MD on: November 5th at: [facility]    You will be contacted the day before your procedure to be given an arrival and procedure time                                                                                                            Day of Procedure   Ensure you have obtained arrival time from the Pain Management department  o We will call 48 hours in advance with your arrival time. Please check any voicemails you may have  o If you arrive past your scheduled procedure time, you may be asked to reschedule your procedure.   For your safety, ensure you have a  with you to remain present throughout your procedure   o If you arrive without a responsible adult to stay with you and drive you home, you may be asked to reschedule your procedure   Take all of your prescribed medications (exceptions noted below) with a small amount of water  o [] Nothing by mouth two (2) hours before your procedure.  It is ok to take your regular medications with a small sip of water.  o [x] Nothing by mouth after midnight the night before your procedure.  It is ok to take your regular medications with a small sip of water.     Wear loose, comfortable clothing    You may wear glasses, dentures, contact lenses and/or hearing aids. Please leave all valuable items at home.   Contact the Pain Management department at [phone] or via [employer] if you are:  o Running a fever above 100 degrees  o Feel ill, have any type of infection, or are taking antibiotics now or have in the past 2  weeks  o Have had any outpatient procedures in the past 2 weeks (colonoscopy, major dental work, etc.)  o If you are allergic to iodine, IVP dye or shellfish.      Contact Information: [phone], ask to speak to the pain management department with any questions or concerns or send a message via [email or web address]

## 2019-10-09 NOTE — H&P (VIEW-ONLY)
Chief Pain Complaint:  Back Pain    History of Present Illness:   Ms. Jurado returns to clinic for follow-up.  She underwent bilateral lumbar medial branch blocks with steroid on August 9, 2019 and reports 0 symptomatic pain relief.  She is quite frustrated by this as she has had great symptomatic pain relief in the past.  Today she rates her pain as a 7/10 which is located primarily in the axial lumbar spine.  She also complains of left knee pain. She does report that 1 of the main differences with this injection and her previous injection is that her wheelchair was broken after the most recent injection which caused her to use her walker for much of the time over the course of 1 week which she seldom uses the walker.  She believes this contributed to her minimal pain relief with the last procedure. She denies having bowel bladder difficulties and denies having numbness and weakness in her legs.  She also would like to be with the dietitian to help with weight loss that she is due to get  in approximately 6 months.    Initial HPI:  This patient is a 59 y.o. female who presents today complaining of the above noted pain/s. The patient describes the pain as follows. Ms. 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  She has a history of low back pain which has been present for many years.  She recently moved bed roots approximately 1 year ago from [place] where she was seen by several different pain physicians including Dr. Micah Reyes and Dr. Salomon performed several injections in her lumbar spine which have provided 6 -9 months of pain relief with each injection. Upon arriving in bed [place] she sought to establish  care with Dr. Lara at [facility] however after her 1st visitation she decided to seek treatment elsewhere.  After she had the unfortunate incident with the dislocation of the knee replacement she was bed-bound for approximately 24 months which ultimately led to a lot of her increased low back pain. She denies having radiation into her lower extremities and denies having weakness in her legs however she does have numbness in the right lower extremity secondary to numerous surgeries on the foot and ankle.  She denies having difficulty with bowel bladder.  She has taken Lyrica in the past which has provided some benefit however insurance no longer covers this medication and she has been taking naproxen which provides some benefit.    Previous Therapy:  Medications:  Lyrica, naproxen, Cymbalta  Injections:  Numerous lumbar injections in the past, unsure if these are epidural steroid injections or medial branch blocks; lumbar medial branch blocks with steroid  Surgeries:  Numerous right lower extremity surgeries  Physical Therapy:  Has participated in physical therapy in the past    Past Surgical History:   Procedure Laterality Date    BREAST SURGERY      breast lump removed    CARDIAC CATHETERIZATION Right 03/2018    wrist    CHOLECYSTECTOMY      ENDOSCOPIC ULTRASOUND OF UPPER GASTROINTESTINAL TRACT N/A 4/9/2019    Procedure: ULTRASOUND, UPPER GI TRACT, ENDOSCOPIC;  Surgeon: Demario Menjivar MD;  Location: [facility];  Service: Endoscopy;  Laterality: N/A;    ENDOSCOPIC ULTRASOUND OF UPPER GASTROINTESTINAL TRACT N/A 5/28/2019    Procedure: ULTRASOUND, UPPER GI TRACT, ENDOSCOPIC;  Surgeon: Justin Munoz MD;  Location: [facility];  Service: Endoscopy;  Laterality: N/A;    foot drop surgery      HYSTERECTOMY      INJECTION OF ANESTHETIC AGENT AROUND MEDIAL BRANCH NERVES INNERVATING LUMBAR FACET JOINT Bilateral 8/9/2019    Procedure: Block-nerve-medial branch-lumbar;  Surgeon: Fady Salmon MD;   "Location: [facility];  Service: Pain Management;  Laterality: Bilateral;    KNEE SURGERY      13     RECTAL SURGERY      TONSILLECTOMY       Imaging / Labs / Studies (reviewed on 10/9/2019):    Review of Systems:  CONSTITUTIONAL: patient denies any fever, chills, or weight loss  SKIN: patient denies any rash or itching  RESPIRATORY: patient denies having any shortness of breath  GASTROINTESTINAL: patient denies having any diarrhea, constipation, or bowel incontinence  GENITOURINARY: patient denies having any abnormal bladder function    MUSCULOSKELETAL:  - patient complains of the above noted pain/s (see chief pain complaint)    NEUROLOGICAL:   - pain as above  - strength in Lower extremities is decreased, on the RIGHT  - sensation in Lower extremities is abnormal, on the RIGHT  - patient denies any loss of bowel or bladder control      PSYCHIATRIC: patient denies any change in mood    Other:  All other systems reviewed and are negative    Physical Exam:  BP (!) 169/94   Pulse 75   Ht 5' 4" (1.626 m)   Wt 135.6 kg (298 lb 15.1 oz)   BMI 51.31 kg/m²  (reviewed on 10/9/2019)\  General:  Alert and oriented, No acute distress.    HEENT:       EOMI.  Normocephalic, atraumatic.   Cardiovascular:  Regular rate.    Gastrointestinal:  Soft.    Respiratory:  Respirations are non-labored.    Cervical Spine:  No masses or atrophy,  Thoracic Spine:  Palpation normal.    Lumbar Spine:  No masses or atrophy,  Motor Exam:      Strength:  Rate on 1-5 scale Right Left   L2- hip flexion  5 5   L3- knee extension  5 5   L4- ankle dorsiflexion 3 5   L5- great toe extension 3 5   S1- ankle plantarflexion 3 5     Sensory Exam:  Full and equal sensation to light touch throughout.    Reflexes   Left DTR's     Knee.   2  Ankle.   1  Right DTR's    Knee.   2  Ankle.   1  Neurologic:  Cranial Nerves II-XII are grossly intact.    Pathologic reflexes:            Clonus - Neg   Psychiatric:  Cooperative.    Gait:  Very antalgic, patient " has AFO    Assessment  Lumbar Spondylosis  Lumbar Degenerative Disc Disease    1. 59 y.o. year old patient with PMH of   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/2017    [facility]2019    [facility]    Recurrent UTI 10/13/2017    Right foot drop 10/13/2017    Urinary incontinence 10/13/2017    Weakness of right lower extremity 10/13/2017      presenting with pain located lumbar spine  2. Pain Generators / Etiology :  Lumbar degenerative disc disease, lumbar spondylosis  3. Failed Meds (E- Effective, NE- Not Effective) Lyrica-effective, naproxen-effective, Cymbalta-effective  4. Physical Therapy - complete physical therapy for greater than 6 weeks in the past  5. Psychological comorbidities -  anxiety  6. Anticoagulants / Antiplatelets:  None     PLAN:  1. Medications:  Continue Cymbalta 30 mg capsules; she has leftover Norco from a surgery approximately 2 years ago which she takes very sparingly;  2. PT - continue physical therapy exercises as tolerated  3. Psychological - continue Klonopin for anxiety  4. Labs - obtain  none; reviewed labs from 10/10/2017, creatinine 1.0  5. Imaging - obtain  none;  6. Interventions - schedule bilateral L3-5 medial branches targeting the L4/L5 and L5/S1 facet joints using steroids  7. Referrals - we will call to find out how to put in a referral to dietary for help with weight loss management  8. Records - none  9. Follow up visit - follow up in clinic in 8 weeks  10. Patient Questions - answered all patient's questions regarding diagnosis, therapy, treatment    LUPE Salmon MD  Interventional Pain  [facility] [place]

## 2019-10-09 NOTE — PROGRESS NOTES
Chief Pain Complaint:  Back Pain    History of Present Illness:   Ms. Jurado returns to clinic for follow-up.  She underwent bilateral lumbar medial branch blocks with steroid on August 9, 2019 and reports 0 symptomatic pain relief.  She is quite frustrated by this as she has had great symptomatic pain relief in the past.  Today she rates her pain as a 7/10 which is located primarily in the axial lumbar spine.  She also complains of left knee pain. She does report that 1 of the main differences with this injection and her previous injection is that her wheelchair was broken after the most recent injection which caused her to use her walker for much of the time over the course of 1 week which she seldom uses the walker.  She believes this contributed to her minimal pain relief with the last procedure. She denies having bowel bladder difficulties and denies having numbness and weakness in her legs.  She also would like to be with the dietitian to help with weight loss that she is due to get  in approximately 6 months.    Initial HPI:  This patient is a 59 y.o. female who presents today complaining of the above noted pain/s. The patient describes the pain as follows. Ms. Jruado has a history of anxiety, hypertension, fibromyalgia, obesity, right knee replacement status post dislocation which sever her popliteal artery and resulted in compartment syndrome of the right lower extremity with subsequent surgeries on the ankle and foot and have led to foot paralysis on the right side for which she wears an AFO.  She has a history of low back pain which has been present for many years.  She recently moved bed roots approximately 1 year ago from [place] where she was seen by several different pain physicians including Dr. Micah Reyes and Dr. Salomon performed several injections in her lumbar spine which have provided 6 -9 months of pain relief with each injection. Upon arriving in bed [place] she sought to establish  care with Dr. Lara at [facility] however after her 1st visitation she decided to seek treatment elsewhere.  After she had the unfortunate incident with the dislocation of the knee replacement she was bed-bound for approximately 24 months which ultimately led to a lot of her increased low back pain. She denies having radiation into her lower extremities and denies having weakness in her legs however she does have numbness in the right lower extremity secondary to numerous surgeries on the foot and ankle.  She denies having difficulty with bowel bladder.  She has taken Lyrica in the past which has provided some benefit however insurance no longer covers this medication and she has been taking naproxen which provides some benefit.    Previous Therapy:  Medications:  Lyrica, naproxen, Cymbalta  Injections:  Numerous lumbar injections in the past, unsure if these are epidural steroid injections or medial branch blocks; lumbar medial branch blocks with steroid  Surgeries:  Numerous right lower extremity surgeries  Physical Therapy:  Has participated in physical therapy in the past    Past Surgical History:   Procedure Laterality Date    BREAST SURGERY      breast lump removed    CARDIAC CATHETERIZATION Right 03/2018    wrist    CHOLECYSTECTOMY      ENDOSCOPIC ULTRASOUND OF UPPER GASTROINTESTINAL TRACT N/A 4/9/2019    Procedure: ULTRASOUND, UPPER GI TRACT, ENDOSCOPIC;  Surgeon: Demario Menjivar MD;  Location: [facility];  Service: Endoscopy;  Laterality: N/A;    ENDOSCOPIC ULTRASOUND OF UPPER GASTROINTESTINAL TRACT N/A 5/28/2019    Procedure: ULTRASOUND, UPPER GI TRACT, ENDOSCOPIC;  Surgeon: Justin Munoz MD;  Location: [facility];  Service: Endoscopy;  Laterality: N/A;    foot drop surgery      HYSTERECTOMY      INJECTION OF ANESTHETIC AGENT AROUND MEDIAL BRANCH NERVES INNERVATING LUMBAR FACET JOINT Bilateral 8/9/2019    Procedure: Block-nerve-medial branch-lumbar;  Surgeon: Fady Salmon MD;   "Location: [facility];  Service: Pain Management;  Laterality: Bilateral;    KNEE SURGERY      13     RECTAL SURGERY      TONSILLECTOMY       Imaging / Labs / Studies (reviewed on 10/9/2019):    Review of Systems:  CONSTITUTIONAL: patient denies any fever, chills, or weight loss  SKIN: patient denies any rash or itching  RESPIRATORY: patient denies having any shortness of breath  GASTROINTESTINAL: patient denies having any diarrhea, constipation, or bowel incontinence  GENITOURINARY: patient denies having any abnormal bladder function    MUSCULOSKELETAL:  - patient complains of the above noted pain/s (see chief pain complaint)    NEUROLOGICAL:   - pain as above  - strength in Lower extremities is decreased, on the RIGHT  - sensation in Lower extremities is abnormal, on the RIGHT  - patient denies any loss of bowel or bladder control      PSYCHIATRIC: patient denies any change in mood    Other:  All other systems reviewed and are negative    Physical Exam:  BP (!) 169/94   Pulse 75   Ht 5' 4" (1.626 m)   Wt 135.6 kg (298 lb 15.1 oz)   BMI 51.31 kg/m²  (reviewed on 10/9/2019)\  General:  Alert and oriented, No acute distress.    HEENT:       EOMI.  Normocephalic, atraumatic.   Cardiovascular:  Regular rate.    Gastrointestinal:  Soft.    Respiratory:  Respirations are non-labored.    Cervical Spine:  No masses or atrophy,  Thoracic Spine:  Palpation normal.    Lumbar Spine:  No masses or atrophy,  Motor Exam:      Strength:  Rate on 1-5 scale Right Left   L2- hip flexion  5 5   L3- knee extension  5 5   L4- ankle dorsiflexion 3 5   L5- great toe extension 3 5   S1- ankle plantarflexion 3 5     Sensory Exam:  Full and equal sensation to light touch throughout.    Reflexes   Left DTR's     Knee.   2  Ankle.   1  Right DTR's    Knee.   2  Ankle.   1  Neurologic:  Cranial Nerves II-XII are grossly intact.    Pathologic reflexes:            Clonus - Neg   Psychiatric:  Cooperative.    Gait:  Very antalgic, patient " has AFO    Assessment  Lumbar Spondylosis  Lumbar Degenerative Disc Disease    1. 59 y.o. year old patient with PMH of   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/2017    Pancreatitis 03/18/2019    [facility]    Recurrent UTI 10/13/2017    Right foot drop 10/13/2017    Urinary incontinence 10/13/2017    Weakness of right lower extremity 10/13/2017      presenting with pain located lumbar spine  2. Pain Generators / Etiology :  Lumbar degenerative disc disease, lumbar spondylosis  3. Failed Meds (E- Effective, NE- Not Effective) Lyrica-effective, naproxen-effective, Cymbalta-effective  4. Physical Therapy - complete physical therapy for greater than 6 weeks in the past  5. Psychological comorbidities -  anxiety  6. Anticoagulants / Antiplatelets:  None     PLAN:  1. Medications:  Continue Cymbalta 30 mg capsules; she has leftover Norco from a surgery approximately 2 years ago which she takes very sparingly;  2. PT - continue physical therapy exercises as tolerated  3. Psychological - continue Klonopin for anxiety  4. Labs - obtain  none; reviewed labs from 10/10/2017, creatinine 1.0  5. Imaging - obtain  none;  6. Interventions - schedule bilateral L3-5 medial branches targeting the L4/L5 and L5/S1 facet joints using steroids  7. Referrals - we will call to find out how to put in a referral to dietary for help with weight loss management  8. Records - none  9. Follow up visit - follow up in clinic in 8 weeks  10. Patient Questions - answered all patient's questions regarding diagnosis, therapy, treatment    LUPE Salmon MD  Interventional Pain  [facility] [place]

## 2019-10-10 ENCOUNTER — OFFICE VISIT (OUTPATIENT)
Dept: INTERNAL MEDICINE | Facility: CLINIC | Age: 59
End: 2019-10-10
Payer: MEDICARE

## 2019-10-10 VITALS
WEIGHT: 293 LBS | BODY MASS INDEX: 50.02 KG/M2 | HEART RATE: 106 BPM | HEIGHT: 64 IN | SYSTOLIC BLOOD PRESSURE: 134 MMHG | DIASTOLIC BLOOD PRESSURE: 84 MMHG | TEMPERATURE: 99 F | OXYGEN SATURATION: 96 %

## 2019-10-10 DIAGNOSIS — Z12.31 ENCOUNTER FOR SCREENING MAMMOGRAM FOR MALIGNANT NEOPLASM OF BREAST: ICD-10-CM

## 2019-10-10 DIAGNOSIS — I12.9 HYPERTENSIVE CHRONIC KIDNEY DISEASE WITH STAGE 1 THROUGH STAGE 4 CHRONIC KIDNEY DISEASE, OR UNSPECIFIED CHRONIC KIDNEY DISEASE: ICD-10-CM

## 2019-10-10 DIAGNOSIS — E66.01 MORBID OBESITY WITH BMI OF 50.0-59.9, ADULT: ICD-10-CM

## 2019-10-10 DIAGNOSIS — Z23 NEED FOR INFLUENZA VACCINATION: ICD-10-CM

## 2019-10-10 DIAGNOSIS — K64.9 HEMORRHOIDS, UNSPECIFIED HEMORRHOID TYPE: ICD-10-CM

## 2019-10-10 DIAGNOSIS — F41.9 ANXIETY: ICD-10-CM

## 2019-10-10 DIAGNOSIS — R21 RASH: Primary | ICD-10-CM

## 2019-10-10 PROCEDURE — 90686 FLU VACCINE (QUAD) GREATER THAN OR EQUAL TO 3YO PRESERVATIVE FREE IM: ICD-10-PCS | Mod: S$GLB,,, | Performed by: FAMILY MEDICINE

## 2019-10-10 PROCEDURE — 3079F PR MOST RECENT DIASTOLIC BLOOD PRESSURE 80-89 MM HG: ICD-10-PCS | Mod: CPTII,S$GLB,, | Performed by: FAMILY MEDICINE

## 2019-10-10 PROCEDURE — 99999 PR PBB SHADOW E&M-EST. PATIENT-LVL V: CPT | Mod: PBBFAC,,, | Performed by: FAMILY MEDICINE

## 2019-10-10 PROCEDURE — 3075F SYST BP GE 130 - 139MM HG: CPT | Mod: CPTII,S$GLB,, | Performed by: FAMILY MEDICINE

## 2019-10-10 PROCEDURE — 99999 PR PBB SHADOW E&M-EST. PATIENT-LVL V: ICD-10-PCS | Mod: PBBFAC,,, | Performed by: FAMILY MEDICINE

## 2019-10-10 PROCEDURE — 3079F DIAST BP 80-89 MM HG: CPT | Mod: CPTII,S$GLB,, | Performed by: FAMILY MEDICINE

## 2019-10-10 PROCEDURE — 3008F BODY MASS INDEX DOCD: CPT | Mod: CPTII,S$GLB,, | Performed by: FAMILY MEDICINE

## 2019-10-10 PROCEDURE — 90686 IIV4 VACC NO PRSV 0.5 ML IM: CPT | Mod: S$GLB,,, | Performed by: FAMILY MEDICINE

## 2019-10-10 PROCEDURE — 99214 OFFICE O/P EST MOD 30 MIN: CPT | Mod: 25,S$GLB,, | Performed by: FAMILY MEDICINE

## 2019-10-10 PROCEDURE — G0008 ADMIN INFLUENZA VIRUS VAC: HCPCS | Mod: S$GLB,,, | Performed by: FAMILY MEDICINE

## 2019-10-10 PROCEDURE — 99214 PR OFFICE/OUTPT VISIT, EST, LEVL IV, 30-39 MIN: ICD-10-PCS | Mod: 25,S$GLB,, | Performed by: FAMILY MEDICINE

## 2019-10-10 PROCEDURE — G0008 FLU VACCINE (QUAD) GREATER THAN OR EQUAL TO 3YO PRESERVATIVE FREE IM: ICD-10-PCS | Mod: S$GLB,,, | Performed by: FAMILY MEDICINE

## 2019-10-10 PROCEDURE — 3008F PR BODY MASS INDEX (BMI) DOCUMENTED: ICD-10-PCS | Mod: CPTII,S$GLB,, | Performed by: FAMILY MEDICINE

## 2019-10-10 PROCEDURE — 3075F PR MOST RECENT SYSTOLIC BLOOD PRESS GE 130-139MM HG: ICD-10-PCS | Mod: CPTII,S$GLB,, | Performed by: FAMILY MEDICINE

## 2019-10-10 RX ORDER — DULOXETIN HYDROCHLORIDE 30 MG/1
30 CAPSULE, DELAYED RELEASE ORAL DAILY
COMMUNITY
End: 2019-11-07 | Stop reason: SDUPTHER

## 2019-10-10 RX ORDER — HYDROCORTISONE 25 MG/G
CREAM TOPICAL 2 TIMES DAILY PRN
Qty: 30 G | Refills: 1 | Status: SHIPPED | OUTPATIENT
Start: 2019-10-10 | End: 2019-12-06 | Stop reason: SDUPTHER

## 2019-10-10 RX ORDER — CLOTRIMAZOLE AND BETAMETHASONE DIPROPIONATE 10; .64 MG/G; MG/G
CREAM TOPICAL 2 TIMES DAILY
Qty: 45 G | Refills: 1 | Status: SHIPPED | OUTPATIENT
Start: 2019-10-10 | End: 2019-12-06 | Stop reason: SDUPTHER

## 2019-10-10 RX ORDER — HYDROCHLOROTHIAZIDE 25 MG/1
25 TABLET ORAL DAILY
Qty: 30 TABLET | Refills: 11 | Status: SHIPPED | OUTPATIENT
Start: 2019-10-10 | End: 2020-08-10

## 2019-10-10 RX ORDER — NAPROXEN 500 MG/1
500 TABLET ORAL 2 TIMES DAILY WITH MEALS
Qty: 180 TABLET | Refills: 0 | Status: SHIPPED | OUTPATIENT
Start: 2019-10-10 | End: 2019-11-13 | Stop reason: SDUPTHER

## 2019-10-10 RX ORDER — CLONAZEPAM 1 MG/1
1 TABLET ORAL NIGHTLY
Qty: 90 TABLET | Refills: 0 | Status: SHIPPED | OUTPATIENT
Start: 2019-10-10 | End: 2020-01-03

## 2019-10-10 RX ORDER — NYSTATIN AND TRIAMCINOLONE ACETONIDE 100000; 1 [USP'U]/G; MG/G
CREAM TOPICAL 2 TIMES DAILY
Qty: 60 G | Refills: 3 | Status: SHIPPED | OUTPATIENT
Start: 2019-10-10

## 2019-10-11 ENCOUNTER — TELEPHONE (OUTPATIENT)
Dept: INTERNAL MEDICINE | Facility: CLINIC | Age: 59
End: 2019-10-11

## 2019-10-11 NOTE — TELEPHONE ENCOUNTER
----- Message from Lindsey Mandujano sent at 10/11/2019  3:29 PM CDT -----  Contact: pt  Patient called stating that she asked for a fluid bill prescription however it was not at the pharmacy. Would like to know if provider forgot or just thought it was not a good idea She can be reached at [phone]      Thanks,  Lindsey Mandujano

## 2019-10-11 NOTE — TELEPHONE ENCOUNTER
Informed pt that her fluid pill was sent to the pharmacy yesterday. She verbalized understanding.

## 2019-10-18 NOTE — PROGRESS NOTES
Subjective:   Patient ID: Rivka Jurado is a 59 y.o. female.  Chief Complaint:  Follow-up      Patient presents for follow-up.  Multiple complaints / concerns.    Rash.  Left arm and left leg.  Reports compliance with Zyrtec and Singulair.  Somewhat itchy.  Unclear etiology.  Not responding to over-the-counter treatments   And multiples prescription creams  Hypertension.  Controlled.  Needs refill of Lotensin 20 mg daily.  Mood not as well controlled in past despite Cymbalta 30 mg daily. Also needs refill of Klonopin 1 mg daily.  Once again interested in seeing dietitian  Weakness with lower extremity, neuropathy, right foot drop and fall risk all stable.    Urinary continence stable.  Needs flu vaccine and mammogram.        Current Outpatient Medications:     benazepril (LOTENSIN) 20 MG tablet, Take 1 tablet (20 mg total) by mouth once daily., Disp: 90 tablet, Rfl: 3    cholestyramine, with sugar, 4 gram Powd, Take 4 g by mouth once daily., Disp: 378 g, Rfl: 0    clonazePAM (KLONOPIN) 1 MG tablet, Take 1 tablet (1 mg total) by mouth every evening., Disp: 90 tablet, Rfl: 0    esomeprazole (NEXIUM) 40 MG capsule, Take 1 capsule (40 mg total) by mouth before breakfast., Disp: 90 capsule, Rfl: 3    fluconazole (DIFLUCAN) 200 MG Tab, Take 1 tablet (200 mg total) by mouth twice a week. (Patient taking differently: Take 200 mg by mouth as needed. ), Disp: 24 tablet, Rfl: 0    fluticasone (FLONASE) 50 mcg/actuation nasal spray, 2 sprays (100 mcg total) by Each Nare route once daily., Disp: 3 Bottle, Rfl: 3    hydrocortisone (ANUSOL-HC) 2.5 % rectal cream, Place rectally 2 (two) times daily as needed for Hemorrhoids., Disp: 30 g, Rfl: 1    metroNIDAZOLE (METROGEL) 0.75 % gel, AAA of face bid, Disp: 45 g, Rfl: 3    mupirocin (BACTROBAN) 2 % ointment, Apply topically 2 (two) times daily., Disp: 30 g, Rfl: 0    nitrofurantoin, macrocrystal-monohydrate, (MACROBID) 100 MG capsule, Take 1 capsule (100 mg total) by  mouth every evening., Disp: 90 capsule, Rfl: 3    nystatin (MYCOSTATIN) powder, Apply topically 2 (two) times daily., Disp: 60 g, Rfl: 11    nystatin-triamcinolone (MYCOLOG II) cream, Apply topically 2 (two) times daily., Disp: 60 g, Rfl: 3    pregabalin (LYRICA) 75 MG capsule, Take 1 capsule (75 mg total) by mouth 2 (two) times daily. (Patient taking differently: Take 75 mg by mouth once daily. ), Disp: 180 capsule, Rfl: 3    sulfacetamide sodium, acne, 10 % Susp, Apply 1 application topically once daily., Disp: 118 mL, Rfl: 5    tiZANidine (ZANAFLEX) 4 MG tablet, Take 2 tablets (8 mg total) by mouth every evening., Disp: 180 tablet, Rfl: 3    tolterodine (DETROL LA) 4 MG 24 hr capsule, Take 1 capsule (4 mg total) by mouth every evening., Disp: 90 capsule, Rfl: 3    valACYclovir (VALTREX) 1000 MG tablet, Take 1 tablet (1,000 mg total) by mouth once daily., Disp: 90 tablet, Rfl: 3    clotrimazole-betamethasone 1-0.05% (LOTRISONE) cream, Apply topically 2 (two) times daily., Disp: 45 g, Rfl: 1    DULoxetine (CYMBALTA) 60 MG capsule, Take 1 capsule (60 mg total) by mouth once daily., Disp: 90 capsule, Rfl: 1    hydroCHLOROthiazide (HYDRODIURIL) 25 MG tablet, Take 1 tablet (25 mg total) by mouth once daily., Disp: 30 tablet, Rfl: 11    naproxen (NAPROSYN) 500 MG tablet, Take 1 tablet (500 mg total) by mouth 2 (two) times daily with meals., Disp: 180 tablet, Rfl: 0    Review of Systems   Constitutional: Negative for activity change, appetite change, chills, fatigue and fever.   Eyes: Negative for visual disturbance.   Respiratory: Negative for cough, chest tightness, shortness of breath and wheezing.    Cardiovascular: Negative for chest pain, palpitations and leg swelling.   Gastrointestinal: Positive for diarrhea and nausea. Negative for abdominal pain, constipation and vomiting.   Genitourinary: Negative for difficulty urinating and pelvic pain.   Musculoskeletal: Positive for arthralgias, back pain and  "gait problem. Negative for myalgias and neck pain.   Skin: Positive for rash.   Neurological: Negative for dizziness, tremors, syncope, weakness, light-headedness, numbness and headaches.   Hematological: Negative for adenopathy. Bruises/bleeds easily.   Psychiatric/Behavioral: Positive for dysphoric mood and sleep disturbance. Negative for agitation, behavioral problems, confusion, decreased concentration, hallucinations, self-injury and suicidal ideas. The patient is nervous/anxious. The patient is not hyperactive.      Objective:   /84   Pulse 106   Temp 99.2 °F (37.3 °C) (Tympanic)   Ht 5' 4" (1.626 m)   Wt 135.7 kg (299 lb 2.6 oz)   SpO2 96%   BMI 51.35 kg/m²     Physical Exam   Constitutional: Vital signs are normal. She appears well-developed and well-nourished. She is cooperative.  Non-toxic appearance. She does not have a sickly appearance. She does not appear ill. No distress.   Morbid obesity   HENT:   Nose: Nose normal. No mucosal edema or rhinorrhea.   Mouth/Throat: Oropharynx is clear and moist and mucous membranes are normal.   Eyes: Right conjunctiva is not injected. Left conjunctiva is not injected. No scleral icterus.   Neck: No JVD present. Carotid bruit is not present. No thyroid mass and no thyromegaly present.   Cardiovascular: Normal rate, regular rhythm and normal heart sounds. Exam reveals no gallop and no friction rub.   No murmur heard.  Pulmonary/Chest: Effort normal and breath sounds normal. No tachypnea and no bradypnea. No respiratory distress. She has no wheezes. She has no rhonchi. She has no rales.   Abdominal: Soft. She exhibits no distension. There is no hepatosplenomegaly. There is no tenderness. There is no rebound and no guarding.   Musculoskeletal: She exhibits no edema.   Neurological: She displays a negative Romberg sign. Coordination and gait normal.   Skin: Skin is warm, dry and intact. Rash noted. No abrasion, no bruising and no ecchymosis noted. "   Psychiatric: She has a normal mood and affect. Her speech is normal and behavior is normal. Judgment and thought content normal. Her mood appears not anxious. Her affect is not angry, not blunt, not labile and not inappropriate. She is not agitated, not aggressive, not hyperactive, not slowed, not withdrawn, not actively hallucinating and not combative. Thought content is not paranoid and not delusional. Cognition and memory are normal. She does not exhibit a depressed mood. She expresses no homicidal and no suicidal ideation. She is attentive.   Nursing note and vitals reviewed.    Assessment:       ICD-10-CM ICD-9-CM   1. Rash R21 782.1   2. Anxiety F41.9 300.00   3. Hypertensive chronic kidney disease with stage 1 through stage 4 chronic kidney disease, or unspecified chronic kidney disease  I12.9 403.90   4. Morbid obesity with BMI of 50.0-59.9, adult E66.01 278.01    Z68.43 V85.43   5. Hemorrhoids, unspecified hemorrhoid type K64.9 455.6   6. Need for influenza vaccination Z23 V04.81   7. Encounter for screening mammogram for malignant neoplasm of breast  Z12.31 V76.12     Plan:   Rash  -     nystatin-triamcinolone (MYCOLOG II) cream; Apply topically 2 (two) times daily.  Dispense: 60 g; Refill: 3  -     clotrimazole-betamethasone 1-0.05% (LOTRISONE) cream; Apply topically 2 (two) times daily.  Dispense: 45 g; Refill: 1  Refill prescription creams.    If no significant improvement, see Dermatology.      Anxiety  -     clonazePAM (KLONOPIN) 1 MG tablet; Take 1 tablet (1 mg total) by mouth every evening.  Dispense: 90 tablet; Refill: 0  Continue Klonopin  Increase Cymbalta 30 mg 2 tablets daily.      Hypertensive chronic kidney disease with stage 1 through stage 4 chronic kidney disease, or unspecified chronic kidney disease   -     Ambulatory Referral to Nutrition Services  -     hydroCHLOROthiazide (HYDRODIURIL) 25 MG tablet; Take 1 tablet (25 mg total) by mouth once daily.  Dispense: 30 tablet; Refill:  11  Controlled.  BP goal.    Continue Lotensin 20 mg daily.    Continue HCTZ 20 mg daily.      Morbid obesity with BMI of 50.0-59.9, adult  -     Ambulatory Referral to Nutrition Services    Hemorrhoids, unspecified hemorrhoid type  -     hydrocortisone (ANUSOL-HC) 2.5 % rectal cream; Place rectally 2 (two) times daily as needed for Hemorrhoids.  Dispense: 30 g; Refill: 1    RHM  -     Influenza - Quadrivalent (PF)  -     Mammo Digital Screening Bilat; Future; Expected date: 10/10/2019    RTC 3 months or sooner as needed

## 2019-10-28 ENCOUNTER — PATIENT OUTREACH (OUTPATIENT)
Dept: ADMINISTRATIVE | Facility: HOSPITAL | Age: 59
End: 2019-10-28

## 2019-11-04 NOTE — PRE-PROCEDURE INSTRUCTIONS
Spoke with patient, arrival time of 0640 at [facility]. Aware of remaining NPO after midnight, confirmed she has a  to come & stay with her.

## 2019-11-05 ENCOUNTER — HOSPITAL ENCOUNTER (OUTPATIENT)
Facility: HOSPITAL | Age: 59
Discharge: HOME | End: 2019-11-05
Attending: PAIN MEDICINE | Admitting: PAIN MEDICINE
Payer: MEDICARE

## 2019-11-05 ENCOUNTER — TELEPHONE (OUTPATIENT)
Dept: PAIN MEDICINE | Facility: CLINIC | Age: 59
End: 2019-11-05

## 2019-11-05 VITALS
OXYGEN SATURATION: 96 % | HEART RATE: 62 BPM | WEIGHT: 289.69 LBS | BODY MASS INDEX: 49.46 KG/M2 | TEMPERATURE: 97 F | RESPIRATION RATE: 12 BRPM | HEIGHT: 64 IN | DIASTOLIC BLOOD PRESSURE: 62 MMHG | SYSTOLIC BLOOD PRESSURE: 110 MMHG

## 2019-11-05 DIAGNOSIS — M47.816 LUMBAR SPONDYLOSIS: Primary | ICD-10-CM

## 2019-11-05 PROCEDURE — 64494 INJ PARAVERT F JNT L/S 2 LEV: CPT | Mod: 50 | Performed by: PAIN MEDICINE

## 2019-11-05 PROCEDURE — 64494 PR INJ DX/THER AGNT PARAVERT FACET JOINT,IMG GUIDE,LUMBAR/SAC, 2ND LEVEL: ICD-10-PCS | Mod: 50,,, | Performed by: PAIN MEDICINE

## 2019-11-05 PROCEDURE — 64493 PR INJ DX/THER AGNT PARAVERT FACET JOINT,IMG GUIDE,LUMBAR/SAC,1ST LVL: ICD-10-PCS | Mod: 50,,, | Performed by: PAIN MEDICINE

## 2019-11-05 PROCEDURE — 63600175 PHARM REV CODE 636 W HCPCS: Performed by: PAIN MEDICINE

## 2019-11-05 PROCEDURE — 64493 INJ PARAVERT F JNT L/S 1 LEV: CPT | Mod: 50,,, | Performed by: PAIN MEDICINE

## 2019-11-05 PROCEDURE — 64493 INJ PARAVERT F JNT L/S 1 LEV: CPT | Mod: 50 | Performed by: PAIN MEDICINE

## 2019-11-05 PROCEDURE — 64495 INJ PARAVERT F JNT L/S 3 LEV: CPT | Mod: 50 | Performed by: PAIN MEDICINE

## 2019-11-05 PROCEDURE — 64494 INJ PARAVERT F JNT L/S 2 LEV: CPT | Mod: 50,,, | Performed by: PAIN MEDICINE

## 2019-11-05 PROCEDURE — 99152 MOD SED SAME PHYS/QHP 5/>YRS: CPT | Mod: ,,, | Performed by: PAIN MEDICINE

## 2019-11-05 PROCEDURE — 99152 PR MOD CONSCIOUS SEDATION, SAME PHYS, 5+ YRS, FIRST 15 MIN: ICD-10-PCS | Mod: ,,, | Performed by: PAIN MEDICINE

## 2019-11-05 PROCEDURE — 25000003 PHARM REV CODE 250: Performed by: PAIN MEDICINE

## 2019-11-05 RX ORDER — MIDAZOLAM HYDROCHLORIDE 1 MG/ML
INJECTION, SOLUTION INTRAMUSCULAR; INTRAVENOUS
Status: DISCONTINUED | OUTPATIENT
Start: 2019-11-05 | End: 2019-11-05 | Stop reason: HOSPADM

## 2019-11-05 RX ORDER — LIDOCAINE HYDROCHLORIDE 20 MG/ML
INJECTION, SOLUTION INFILTRATION; PERINEURAL
Status: DISCONTINUED | OUTPATIENT
Start: 2019-11-05 | End: 2019-11-05 | Stop reason: HOSPADM

## 2019-11-05 RX ORDER — FENTANYL CITRATE 50 UG/ML
INJECTION, SOLUTION INTRAMUSCULAR; INTRAVENOUS
Status: DISCONTINUED | OUTPATIENT
Start: 2019-11-05 | End: 2019-11-05 | Stop reason: HOSPADM

## 2019-11-05 RX ORDER — METHYLPREDNISOLONE ACETATE 40 MG/ML
INJECTION, SUSPENSION INTRA-ARTICULAR; INTRALESIONAL; INTRAMUSCULAR; SOFT TISSUE
Status: DISCONTINUED | OUTPATIENT
Start: 2019-11-05 | End: 2019-11-05 | Stop reason: HOSPADM

## 2019-11-05 NOTE — TELEPHONE ENCOUNTER
Contacted pt. Appt scheduled for Left knee injection per  scheduled tomorrow 11/6/19. All questions answered.//lp

## 2019-11-05 NOTE — DISCHARGE SUMMARY
[facility]  Short Stay  Discharge Summary    Admit Date: 11/5/2019    Discharge Date and Time: 11/5/2019  9:04 AM      Discharge Attending Physician: LUPE Salmon MD     Hospital Course (synopsis of major diagnoses, care, treatment, and services provided during the course of the hospital stay): Patient was admitted to Pre-op where informed consent was signed.  The patient was then taken to the procedure suite where the procedure was performed.  The patient was then return to the Pre-Op area and discharge was performed.     Final Diagnoses:    Principal Problem: <principal problem not specified>   Secondary Diagnoses: There are no hospital problems to display for this patient.      Discharged Condition: good    Disposition: Home or Self Care    Follow up/Patient Instructions:    Medications:  Reconciled Home Medications:      Medication List      CHANGE how you take these medications    fluconazole 200 MG Tab  Commonly known as:  DIFLUCAN  Take 1 tablet (200 mg total) by mouth twice a week.  What changed:    · when to take this  · reasons to take this     pregabalin 75 MG capsule  Commonly known as:  LYRICA  Take 1 capsule (75 mg total) by mouth 2 (two) times daily.  What changed:  when to take this        CONTINUE taking these medications    benazepril 20 MG tablet  Commonly known as:  LOTENSIN  Take 1 tablet (20 mg total) by mouth once daily.     cholestyramine (with sugar) 4 gram Powd  Take 4 g by mouth once daily.     clonazePAM 1 MG tablet  Commonly known as:  KLONOPIN  Take 1 tablet (1 mg total) by mouth every evening.     clotrimazole-betamethasone 1-0.05% cream  Commonly known as:  LOTRISONE  Apply topically 2 (two) times daily.     DULoxetine 30 MG capsule  Commonly known as:  CYMBALTA  Take 30 mg by mouth once daily.     esomeprazole 40 MG capsule  Commonly known as:  NEXIUM  Take 1 capsule (40 mg total) by mouth before breakfast.     fluticasone propionate 50 mcg/actuation nasal  spray  Commonly known as:  FLONASE  2 sprays (100 mcg total) by Each Nare route once daily.     hydroCHLOROthiazide 25 MG tablet  Commonly known as:  HYDRODIURIL  Take 1 tablet (25 mg total) by mouth once daily.     hydrocortisone 2.5 % rectal cream  Commonly known as:  ANUSOL-HC  Place rectally 2 (two) times daily as needed for Hemorrhoids.     metroNIDAZOLE 0.75 % gel  Commonly known as:  METROGEL  AAA of face bid     mupirocin 2 % ointment  Commonly known as:  BACTROBAN  Apply topically 2 (two) times daily.     naproxen 500 MG tablet  Commonly known as:  NAPROSYN  Take 1 tablet (500 mg total) by mouth 2 (two) times daily with meals.     nitrofurantoin (macrocrystal-monohydrate) 100 MG capsule  Commonly known as:  MACROBID  Take 1 capsule (100 mg total) by mouth every evening.     nystatin powder  Commonly known as:  MYCOSTATIN  Apply topically 2 (two) times daily.     nystatin-triamcinolone cream  Commonly known as:  MYCOLOG II  Apply topically 2 (two) times daily.     sulfacetamide sodium (acne) 10 % Susp  Apply 1 application topically once daily.     tiZANidine 4 MG tablet  Commonly known as:  ZANAFLEX  Take 2 tablets (8 mg total) by mouth every evening.     tolterodine 4 MG 24 hr capsule  Commonly known as:  DETROL LA  Take 1 capsule (4 mg total) by mouth every evening.     valACYclovir 1000 MG tablet  Commonly known as:  VALTREX  Take 1 tablet (1,000 mg total) by mouth once daily.          Discharge Procedure Orders   Diet general     Call MD for:  severe uncontrolled pain     Call MD for:  difficulty breathing, headache or visual disturbances     Call MD for:  redness, tenderness, or signs of infection (pain, swelling, redness, odor or green/yellow discharge around incision site)     Activity as tolerated

## 2019-11-05 NOTE — OP NOTE
Procedure: Lumbar Medial Branch Block under Fluoroscopic Guidance    Side: bilateral     Level:  Sacral ala (Corresponding to the L5 dorsal ramus), L5 transverse process (Corresponding to the L4 medial branch) and L4 transverse process (Corresponding to the L3 medial branch)     PROCEDURE DATE: 11/5/2019    Pre-operative Diagnosis: Lumbar Spondylosis  Post-operative Diagnosis: Lumbar Spondylosis    Provider: LUPE Salmon MD  Assistant(s): none    Anesthesia: Local, IV Sedation    >> 1 mg of VERSED    >> 25 mcg of FENTANYL     Indication: Low back pain without radiculopathy. Symptoms unresponsive to conservative treatments. Fluoroscopy was used to optimize visualization of needle placement and to maximize safety.     Procedure Description / Technique:  The patient was seen and identified in the preoperative area. Risks, benefits, complications, and alternatives were discussed with the patient. The patient agreed to proceed with the procedure and signed the consent. The site and side of the procedure was identified and marked. An iv was started.     The patient was taken to the procedural suite and positioned in prone orientation on the procedure table. A pillow was placed under the abdomen to reduce lumbar lordosis. A time out was performed. The procedure, site, side, and allergies were stated and agreed to by all present. The lumbosacral area was widely prepped with ChloraPrep. The procedural site was draped in usual sterile fashion. Vital signs were closely monitored throughout this procedure. Conscious sedation was used for this procedure to decrease patient anxiety.    The Left sacral ala and superior articular process was identified and marked on AP fluoroscopic imaging. Subcutaneous tissues were localized using 1% PF Lidocaine to improve patient comfort. A 22 gauge 5 inch spinal needle was advance until the needle rested on OS at the interface of the sacral ala and the base of the sacral superior articular  process. After negative aspiration, 1ml of 2% lidocaine was injected. No pain or paresthesia was noted on injection. After bilateral injection, the fluoroscope was rotated into the oblique view and the spinal needle was advanced towards the eye of the ayla dog until os was contacted. 1ml of a combination of 2% lidocaine and 80mg depomedrol was injected after negative aspiration. No pain or paresthesia was noted. This technique was repeated at each of the above noted levels. The spinal needle was removed intact following injection at each targeted site. The stylet was replaced prior to needle removal at each site.    Description of Findings: Not applicable    Prosthetic devices, grafts, tissues, or devices implanted: None    Specimen Removed: No    ESTIMATED BLOOD LOSS: minimal    COMPLICATIONS: None    DISPOSITION / PLANS: The patient was transferred to the recovery area in a stable condition for observation. The patient was reexamined prior to discharge. There was no evidence of acute neurologic injury following the procedure.  Patient was discharged from the recovery room after meeting discharge criteria. Home discharge instructions were given to the patient by the staff.

## 2019-11-05 NOTE — TELEPHONE ENCOUNTER
----- Message from Cindy Thurston sent at 11/5/2019 11:47 AM CST -----  Contact: pt  Would like to consult with nurse regarding the doctor saying he can work her in tomorrow to get that left knee injection, she needs to know a time she prefer the evening. Please give a call back at [phone].        Thanks,  Cindy REEDER

## 2019-11-05 NOTE — DISCHARGE INSTRUCTIONS
Pain Post Injection     1. Side effects may include: headache, restlessness at night, weakness to upper or lower extremities (arms or legs), flushing of the face and/ or neck. None are life threatening and will subside within 2-3 days.   2. If you have SEVERE headache with nausea and extreme pain, please call office ([phone]). Unless you usually have this type of migraine headache.   3. If you develop fever (greater than 101 F) or have any redness, swelling, or drainage at the injection site(s), please call off ([phone]). This may be a sign of infection.   4. If a rash or whelps (like hives) occur, please call the office ([phone]).  5. If you are a heart patient, please watch for symptoms such as swelling in your hands and feet and shortness of breath. If any of these symptoms occur, please notify your primary care/ heart doctor and go to the nearest emergency room.  6. If you have high blood pressure, you may experience an increase in your blood pressure over the next two weeks. Please continue to monitor your blood pressure and contact your primary care provider if your blood pressure does not return to baseline.    7. If you are a diabetic or you monitor your blood sugar, you may have an increase in your blood sugar over the next two weeks. If your blood sugar does not return to your baseline, please contact your primary care provider.  8. NO HEAT TO THE INJECTION SITE for the next 24 hours including: bath or shower, heating pad, moist heat, and / or hot tubs.   9. May use ice pack to injection site for any pain or discomfort. Apply ice pack for 20 minutes then remove for 20 minutes before re- applying to site. (recipe for homemade frozen gel pack below)  10. DO NOT DRIVE OR OPERATE HEAVY MACHINERY UNTIL TOMORROW MORNING.   11. OK to resume all medications unless otherwise directed by your  doctor.  12. Injection(s) may take up to two weeks until full effects are noted.  13. You may return to normal activity/ work the following day.    Homemade Frozen Gel Ice Pack:  1 bottle of rubbing alcohol  3 bottles of water (use rubbing alcohol bottle to measure)  2 large zip lock bags    Instructions:  1. Pour alcohol and water into zip lock bag. Make sure bag is large enough to allow for expansion.  2. Try to get as much air out of the freezer bag before sealing it shut.   3. Place the bag and its contents inside a second freezer bag to contain any leakage.   4. Leave the bag in the freezer for at least one hour.  5. When it's ready, place a towel between the gel pack and bare skin to avoid burning the skin.     Recovery After Procedural Sedation (Adult)  You have been given medicine by vein to make you sleep during your surgery. This may have included both a pain medicine and sleeping medicine. Most of the effects have worn off. But you may still have some drowsiness for the next 6 to 8 hours.  Home care  Follow these guidelines when you get home:  · For the next 8 hours, you should be watched by a responsible adult. This person should make sure your condition is not getting worse.  · Don't drink any alcohol for the next 24 hours.  · Don't drive, operate dangerous machinery, or make important business or personal decisions during the next 24 hours.  Note: Your healthcare provider may tell you not to take any medicine by mouth for pain or sleep in the next 4 hours. These medicines may react with the medicines you were given in the hospital. This could cause a much stronger response than usual.  Follow-up care  Follow up with your healthcare provider if you are not alert and back to your usual level of activity within 12 hours.  When to seek medical advice  Call your healthcare provider right away if any of these occur:  · Drowsiness gets worse  · Weakness or dizziness gets worse  · Repeated vomiting  · You  can't be awakened   Date Last Reviewed: 10/18/2016  © 2012-6567 [employer]. [address] [place] [address], [place] [address]. All rights reserved. This information is not intended as a substitute for professional medical care. Always follow your healthcare professional's instructions.

## 2019-11-06 ENCOUNTER — OFFICE VISIT (OUTPATIENT)
Dept: PAIN MEDICINE | Facility: CLINIC | Age: 59
End: 2019-11-06
Payer: MEDICARE

## 2019-11-06 VITALS
HEIGHT: 64 IN | DIASTOLIC BLOOD PRESSURE: 81 MMHG | SYSTOLIC BLOOD PRESSURE: 128 MMHG | WEIGHT: 289.69 LBS | BODY MASS INDEX: 49.46 KG/M2 | HEART RATE: 84 BPM

## 2019-11-06 DIAGNOSIS — M17.10 ARTHRITIS OF KNEE: Primary | ICD-10-CM

## 2019-11-06 PROCEDURE — 3008F PR BODY MASS INDEX (BMI) DOCUMENTED: ICD-10-PCS | Mod: CPTII,S$GLB,, | Performed by: PAIN MEDICINE

## 2019-11-06 PROCEDURE — 3079F DIAST BP 80-89 MM HG: CPT | Mod: CPTII,S$GLB,, | Performed by: PAIN MEDICINE

## 2019-11-06 PROCEDURE — 99214 PR OFFICE/OUTPT VISIT, EST, LEVL IV, 30-39 MIN: ICD-10-PCS | Mod: S$GLB,,, | Performed by: PAIN MEDICINE

## 2019-11-06 PROCEDURE — 99999 PR PBB SHADOW E&M-EST. PATIENT-LVL III: ICD-10-PCS | Mod: PBBFAC,,, | Performed by: PAIN MEDICINE

## 2019-11-06 PROCEDURE — 3074F PR MOST RECENT SYSTOLIC BLOOD PRESSURE < 130 MM HG: ICD-10-PCS | Mod: CPTII,S$GLB,, | Performed by: PAIN MEDICINE

## 2019-11-06 PROCEDURE — 3079F PR MOST RECENT DIASTOLIC BLOOD PRESSURE 80-89 MM HG: ICD-10-PCS | Mod: CPTII,S$GLB,, | Performed by: PAIN MEDICINE

## 2019-11-06 PROCEDURE — 99214 OFFICE O/P EST MOD 30 MIN: CPT | Mod: S$GLB,,, | Performed by: PAIN MEDICINE

## 2019-11-06 PROCEDURE — 99999 PR PBB SHADOW E&M-EST. PATIENT-LVL III: CPT | Mod: PBBFAC,,, | Performed by: PAIN MEDICINE

## 2019-11-06 PROCEDURE — 3074F SYST BP LT 130 MM HG: CPT | Mod: CPTII,S$GLB,, | Performed by: PAIN MEDICINE

## 2019-11-06 PROCEDURE — 3008F BODY MASS INDEX DOCD: CPT | Mod: CPTII,S$GLB,, | Performed by: PAIN MEDICINE

## 2019-11-06 RX ORDER — METHYLPREDNISOLONE ACETATE 40 MG/ML
40 INJECTION, SUSPENSION INTRA-ARTICULAR; INTRALESIONAL; INTRAMUSCULAR; SOFT TISSUE
Status: COMPLETED | OUTPATIENT
Start: 2019-11-06 | End: 2019-11-06

## 2019-11-06 RX ADMIN — METHYLPREDNISOLONE ACETATE 40 MG: 40 INJECTION, SUSPENSION INTRA-ARTICULAR; INTRALESIONAL; INTRAMUSCULAR; SOFT TISSUE at 02:11

## 2019-11-06 NOTE — PATIENT INSTRUCTIONS
-provide a referral at patient's request for rheumatology  -performed left knee intra-articular joint injection today in clinic  -follow up in clinic in several weeks

## 2019-11-06 NOTE — PROGRESS NOTES
Chief Pain Complaint:  Left Knee Pain    History of Present Illness:   Ms. Jurado returns to clinic for follow-up.  She underwent bilateral lumbar medial branch blocks yesterday with steroid however today she is here for a left intra-articular knee joint injection.  She has undergone intra-articular knee injections in the past however this caused increased pain so she is very nervous about having injection today.  She currently rates her pain as a 4/10 which is primarily located the medial lateral joint lines of the left knee.  She has arthritis in the knee and has had a knee replacement on the right side which has lead to further complications therefore she is trying to avoid knee replacement on the left if at all possible.  She denies having numbness or weakness in the left leg.  Her symptoms are improved with sitting and worse with standing and walking long distances.    Initial HPI:  This patient is a 59 y.o. female who presents today complaining of the above noted pain/s. The patient describes the pain as follows. Ms. 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  She has a history of low back pain which has been present for many years.  [place] ago from [place] where she was seen by several different pain physicians including Dr. Micah Reyes and Dr. Salomon performed several injections in her lumbar spine which have provided 6 -9 months of pain relief with each injection. [place] she [facility] however after her 1st visitation she decided to seek treatment elsewhere.  After she had the unfortunate incident  with the dislocation of the knee replacement she was bed-bound for approximately 24 months which ultimately led to a lot of her increased low back pain. She denies having radiation into her lower extremities and denies having weakness in her legs however she does have numbness in the right lower extremity secondary to numerous surgeries on the foot and ankle.  She denies having difficulty with bowel bladder.  She has taken Lyrica in the past which has provided some benefit however insurance no longer covers this medication and she has been taking naproxen which provides some benefit.    Previous Therapy:  Medications:  Lyrica, naproxen, Cymbalta  Injections:  Numerous lumbar injections in the past, unsure if these are epidural steroid injections or medial branch blocks; lumbar medial branch blocks with steroid  Surgeries:  Numerous right lower extremity surgeries  Physical Therapy:  Has participated in physical therapy in the past    Past Surgical History:   Procedure Laterality Date    BREAST SURGERY      breast lump removed    CARDIAC CATHETERIZATION Right 03/2018    wrist    CHOLECYSTECTOMY      ENDOSCOPIC ULTRASOUND OF UPPER GASTROINTESTINAL TRACT N/A 4/9/2019    Procedure: ULTRASOUND, UPPER GI TRACT, ENDOSCOPIC;  Surgeon: Demario Menjivar MD;  Location: [facility];  Service: Endoscopy;  Laterality: N/A;    ENDOSCOPIC ULTRASOUND OF UPPER GASTROINTESTINAL TRACT N/A 5/28/2019    Procedure: ULTRASOUND, UPPER GI TRACT, ENDOSCOPIC;  Surgeon: Justin Munoz MD;  Location: [facility];  Service: Endoscopy;  Laterality: N/A;    foot drop surgery      HYSTERECTOMY      INJECTION OF ANESTHETIC AGENT AROUND MEDIAL BRANCH NERVES INNERVATING LUMBAR FACET JOINT Bilateral 8/9/2019    Procedure: Block-nerve-medial branch-lumbar;  Surgeon: Fady Salmon MD;  Location: [facility];  Service: Pain Management;  Laterality: Bilateral;    INJECTION OF ANESTHETIC AGENT AROUND MEDIAL BRANCH NERVES INNERVATING LUMBAR FACET  "JOINT Bilateral 11/5/2019    Procedure: Bilateral L3-5 MBB;  Surgeon: Fady Salmon MD;  Location[facility];  Service: Pain Management;  Laterality: Bilateral;    KNEE SURGERY      13     RECTAL SURGERY      TONSILLECTOMY       Imaging / Labs / Studies (reviewed on 11/6/2019):    Review of Systems:  CONSTITUTIONAL: patient denies any fever, chills, or weight loss  SKIN: patient denies any rash or itching  RESPIRATORY: patient denies having any shortness of breath  GASTROINTESTINAL: patient denies having any diarrhea, constipation, or bowel incontinence  GENITOURINARY: patient denies having any abnormal bladder function    MUSCULOSKELETAL:  - patient complains of the above noted pain/s (see chief pain complaint)    NEUROLOGICAL:   - pain as above  - strength in Lower extremities is decreased, on the RIGHT  - sensation in Lower extremities is abnormal, on the RIGHT  - patient denies any loss of bowel or bladder control      PSYCHIATRIC: patient denies any change in mood    Other:  All other systems reviewed and are negative    Physical Exam:  /81 (BP Location: Left arm, Patient Position: Sitting, BP Method: Large (Automatic))   Pulse 84   Ht 5' 4" (1.626 m)   Wt 131.4 kg (289 lb 11 oz)   BMI 49.72 kg/m²  (reviewed on 11/6/2019)\  General:  Alert and oriented, No acute distress.    HEENT:       EOMI.  Normocephalic, atraumatic.   Cardiovascular:  Regular rate.    Gastrointestinal:  Soft.    Respiratory:  Respirations are non-labored.    Cervical Spine:  No masses or atrophy,  Thoracic Spine:  No masses or atrophy   Lumbar Spine:  No masses or atrophy,  Knee Exam:  Inspection -  symmetrical, no visible erythema   Palpation pain -  tender palpation over medial lateral joint line  Sensory Exam:  Full and equal sensation to light touch throughout.    Reflexes   Left DTR's     Knee.   2  Ankle.   1  Right DTR's    Knee.   2  Ankle.   1  Neurologic:  Cranial Nerves II-XII are grossly intact.  "   Pathologic reflexes:            Clonus - Neg   Psychiatric:  Cooperative.    Gait:  Very antalgic, patient has AFO    Assessment  Lumbar Spondylosis  Lumbar Degenerative Disc Disease    1. 59 y.o. year old patient with PMH of   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/2017    Pancreatitis 03/18/2019    [facility]    Recurrent UTI 10/13/2017    Right bundle branch block     Right foot drop 10/13/2017    Urinary incontinence 10/13/2017    Weakness of right lower extremity 10/13/2017      presenting with pain located lumbar spine  2. Pain Generators / Etiology :  Lumbar degenerative disc disease, lumbar spondylosis  3. Failed Meds (E- Effective, NE- Not Effective) Lyrica-effective, naproxen-effective, Cymbalta-effective  4. Physical Therapy - complete physical therapy for greater than 6 weeks in the past  5. Psychological comorbidities -  anxiety  6. Anticoagulants / Antiplatelets:  None     PLAN:  1. Medications:  Continue Cymbalta and naproxen as prescribed  2. PT - continue physical therapy exercises as tolerated  3. Psychological - continue Klonopin for anxiety  4. Labs - obtain  none; reviewed labs from 10/10/2017, creatinine 1.0  5. Imaging - obtain  none;  6. Interventions - for perform left knee intra-articular steroid injection today in clinic; please see procedure note below  7. Referrals - provide a referral to rheumatology per the patient's request to establish care  8. Records - none  9. Follow up visit - follow up in clinic in 8 weeks  10. Patient Questions - answered all patient's questions regarding diagnosis, therapy, treatment    LUPE Salmon MD  Interventional Pain  [facility] [place] [facility]    Procedure: Knee joint injection    Side: Left    Pre-procedure  diagnosis: osteoarthritis of the knee (of the above noted laterality)  Post-procedure diagnosis: same    PROVIDER: LUPE Salmon MD  Assistant(s): None    ANESTHESIA: Local, No Sedation     INDICATION: The patient has knee pain unresponsive to conservative treatments.     Description of Procedure:  Prior to starting this procedure, risks, benefits, complications, and alternatives were discussed with the patient. The patient agreed to proceed with the procedure and gave verbal consent.  The site and side of the procedure was identified and marked.  The knee was then cleaned using alcohol swab.  The above noted joint/s was identified.  A 27-gauge 1.5 inch needle was used to localize the site of entry with 3 mL of 1% PF Lidocaine. A 25 gauge 3.5 inch spinal needle was then introduced and advanced into the joint. Following negative aspiration, 2cc of omnipaque was injected with appropriate spread.  Following negative aspiration, a solution containing 3 mL of 1% PF Lidocaine and 1 mL of Methylprednisolone (40 mg/mL) was then injected. There was minimal resistance encountered throughout injection. No paresthesias were noted on injection. The needle stylet was replaced and the needle was removed intact. The patient tolerated the procedure well. A bandage was applied to the site of injection.    Description of Findings: Not applicable    Prosthetic devices, grafts, tissues, or devices implanted: None    Specimen Removed: No    Estimated Blood Loss: minimal    COMPLICATIONS: None

## 2019-11-07 ENCOUNTER — TELEPHONE (OUTPATIENT)
Dept: INTERNAL MEDICINE | Facility: CLINIC | Age: 59
End: 2019-11-07

## 2019-11-07 ENCOUNTER — TELEPHONE (OUTPATIENT)
Dept: PAIN MEDICINE | Facility: CLINIC | Age: 59
End: 2019-11-07

## 2019-11-07 RX ORDER — DULOXETIN HYDROCHLORIDE 60 MG/1
60 CAPSULE, DELAYED RELEASE ORAL DAILY
Qty: 90 CAPSULE | Refills: 1 | Status: SHIPPED | OUTPATIENT
Start: 2019-11-07 | End: 2020-02-17

## 2019-11-07 NOTE — TELEPHONE ENCOUNTER
Spoke with mrs zheng she stated she had a lot going on that she would call back to schedule the appt with Dr NYE in rheum I advised Mrs zheng the referral was in the system when ready

## 2019-11-07 NOTE — TELEPHONE ENCOUNTER
Pt is requesting a increase for Cymbalta. She states that due to holidays and things going on with her that's why she needs it.   Please advise.//smm

## 2019-11-07 NOTE — TELEPHONE ENCOUNTER
Contacted patient to check relief of diagnostic injection with Dr. Salmon on 11/05/2019. Patient reports 100% relief from injection. Will notify Dr. Salmon of patient's relief.

## 2019-11-07 NOTE — TELEPHONE ENCOUNTER
----- Message from Sherly Jordan sent at 11/7/2019  8:37 AM CST -----  Contact: self [phone]  States that she is calling to get an increase on the Cymbalta. States that she would like to increase it to the 60mg. Pt uses     [facility] [place] - [address]  [address] [address]  [place] [address]  Phone: [phone] Fax: [phone]    Please call back at [phone]//thank you acc

## 2019-11-07 NOTE — TELEPHONE ENCOUNTER
She may take two 30 mg tablets daily for a total 60 mg dose.  I will send a new prescription for 60 mg tablets that she can take once a day.

## 2019-11-13 ENCOUNTER — CLINICAL SUPPORT (OUTPATIENT)
Dept: DIABETES | Facility: CLINIC | Age: 59
End: 2019-11-13
Payer: MEDICARE

## 2019-11-13 VITALS — WEIGHT: 285.06 LBS | BODY MASS INDEX: 48.67 KG/M2 | HEIGHT: 64 IN

## 2019-11-13 DIAGNOSIS — I12.9 HYPERTENSIVE CHRONIC KIDNEY DISEASE WITH STAGE 1 THROUGH STAGE 4 CHRONIC KIDNEY DISEASE, OR UNSPECIFIED CHRONIC KIDNEY DISEASE: Primary | ICD-10-CM

## 2019-11-13 DIAGNOSIS — E66.01 MORBID OBESITY WITH BMI OF 50.0-59.9, ADULT: ICD-10-CM

## 2019-11-13 PROCEDURE — 97802 MEDICAL NUTRITION INDIV IN: CPT | Mod: S$GLB,,, | Performed by: DIETITIAN, REGISTERED

## 2019-11-13 PROCEDURE — 97802 PR MED NUTR THER, 1ST, INDIV, EA 15 MIN: ICD-10-PCS | Mod: S$GLB,,, | Performed by: DIETITIAN, REGISTERED

## 2019-11-13 RX ORDER — NAPROXEN 500 MG/1
500 TABLET ORAL 2 TIMES DAILY WITH MEALS
Qty: 180 TABLET | Refills: 0 | Status: SHIPPED | OUTPATIENT
Start: 2019-11-13 | End: 2020-04-13

## 2019-11-13 NOTE — PROGRESS NOTES
"PCP: Kennedy Singh MD   REFERRING PROVIDER: Kennedy Singh MD     HISTORY OF PRESENT ILLNESS: 59 y.o. female patient is in clinic today for weight management. Patient has been successful with weight loss in the past. She lost 80 pounds 2 years ago but has steadily gained it back. She got tired of eating the same foods and now lives with her fiance who likes to cook.     VITAL SIGNS:  Height: 5' 4" (162.6 cm)   Weight: 129.3 kg (285 lb 0.9 oz)   Body mass index is 48.93 kg/m².      ALLERGIES & MEDICATIONS: Reviewed.  MEDICAL/SURGICAL & FAMILY HISTORY: Reviewed.    LABORATORY:  Reviewed Diabetes Management Flowsheet and Results Review.    SELF-MONITORING: Food - none are avail    ACTIVITY LEVEL: Aerobic - none. Resistance - none. Patient uses a wheelchair and walker and gets out of breath when she walks short distances. Has back pain and brace on her foot. Can only do seated exercises. Plans to start Physical therapy and follow exercises provided.     NUTRITION INTAKE: Light breakfast, Dinner is largest meal. Meal patterns include 2 meals, 1-2 snacks daily with intake ~cals/d. Patient is not limiting carbohydrates, saturated fat or sodium. Inadequate intakes of fruits, vegetables, whole grains.  B - Rice Krispies, silk milk, 1/2 banana, 12 ounces coffee with 3T sugar and coffeemate.   S - None.   L - None.   S - 1 cup ice cream  D - Rice Krispies, silk milk, 1/2 banana   S - None.   Beverages - Water  Dining out - [employer], [employer], [employer] [place]- has cut back on eating out.   Patient doesn't like to cook. She and her fiance live in a small townhouse and it's difficult to cook.     PSYCHOSOCIAL: Stage of change - contemplation  Barriers to change - Motivation, fiance likes to cook and doesn't make healthy choices all the time.    EDUCATIONAL ASSESSMENT:   1. Impediments in learning class environment - NONE.  2. Needs improvement in self care management skills.  -healthy eating  -being " active  -self-monitoring  -problem solving  -reducing risks    MNT ASSESSMENT:   3432-3664 calories, 8 ounces protein daily  30 grams carb/meal, 15 grams carb/snack  increase fruit 2 serv/d, vegetables 2+ cups/d, whole grains 3+serv/d, lowfat dairy 1+serving/d  low-fat, low-sodium  150 min physical activity per week, moderate intensity, as tolerated    PLAN:   Reviewed MNT guidelines for weight management. Reduce (and eventually eliminate) sugar in coffee, no ice-cream, consistent meals throughout the day, and increase intake of non-starchy vegetables.   Encouraged daily self-monitoring of food and activity patterns, return records to clinic. Advised patient to record intake in smart phone fabiano.    Provided meal-planning instruction via foodlists, plate method, food models, food labels and/or ADA booklet. Reviewed micro/macronutrient effect on health management. Discussed carbohydrate counting and spacing techniques with emphasis on cardiovascular wellness health strategies, functional foods. Reviewed principles of energy metabolism to assist weight and health management.    Discussed importance of daily physical activity with review of benefits, methods, guidelines and precautions.   Discussed behavioral strategies for improving social and environmental support of lifestyle changes.      GOALS: Self-monitoring: Daily food & activity journal. Meal Plan: 90% Accuracy. Activity:150 min/wk.     Visit Time Spent:  60 minutes  Thank you for the opportunity to work with your patient.

## 2019-11-13 NOTE — LETTER
November 13, 2019        Kennedy Singh MD  [address] [facility] [address] [place] [address]             [facility]  [address] [facility] [address] [facility] [place] [address]  Phone: [phone]  Fax: [phone]   Patient: Rivka Jurado   MR Number: [id number]   YOB: 1960   Date of Visit: 11/13/2019       Dear Dr. Singh:    Thank you for referring Rivka Jurado to me for evaluation. Below are the relevant portions of my assessment and plan of care.            If you have questions, please do not hesitate to call me. I look forward to following Rivka along with you.    Sincerely,      Megan Santoro RD, CDE           CC  No Recipients

## 2019-12-06 DIAGNOSIS — R21 RASH: ICD-10-CM

## 2019-12-06 DIAGNOSIS — K64.9 HEMORRHOIDS, UNSPECIFIED HEMORRHOID TYPE: ICD-10-CM

## 2019-12-09 RX ORDER — LINACLOTIDE 72 UG/1
1 CAPSULE, GELATIN COATED ORAL DAILY
COMMUNITY
Start: 2019-11-19 | End: 2020-09-15

## 2019-12-09 RX ORDER — PANCRELIPASE 24000; 76000; 120000 [USP'U]/1; [USP'U]/1; [USP'U]/1
1 CAPSULE, DELAYED RELEASE PELLETS ORAL DAILY
COMMUNITY
Start: 2019-11-19 | End: 2020-06-01

## 2019-12-09 RX ORDER — HYDROCORTISONE 25 MG/G
CREAM TOPICAL 2 TIMES DAILY PRN
Qty: 28 G | Refills: 11 | Status: SHIPPED | OUTPATIENT
Start: 2019-12-09

## 2019-12-09 RX ORDER — CLOTRIMAZOLE AND BETAMETHASONE DIPROPIONATE 10; .64 MG/G; MG/G
CREAM TOPICAL 2 TIMES DAILY PRN
Qty: 45 G | Refills: 11 | Status: SHIPPED | OUTPATIENT
Start: 2019-12-09

## 2019-12-10 DIAGNOSIS — F41.9 ANXIETY: ICD-10-CM

## 2019-12-10 RX ORDER — CLONAZEPAM 1 MG/1
TABLET ORAL
Qty: 90 TABLET | Refills: 0 | OUTPATIENT
Start: 2019-12-10

## 2019-12-12 DIAGNOSIS — J34.0 NASAL ABSCESS: ICD-10-CM

## 2019-12-12 DIAGNOSIS — K52.9 CHRONIC DIARRHEA: ICD-10-CM

## 2019-12-12 RX ORDER — CHOLESTYRAMINE 4 G/9G
4 POWDER, FOR SUSPENSION ORAL DAILY
Qty: 378 G | Refills: 0 | Status: SHIPPED | OUTPATIENT
Start: 2019-12-12 | End: 2020-02-14

## 2019-12-12 NOTE — TELEPHONE ENCOUNTER
----- Message from Sherly Bartonmohamudcheco sent at 12/12/2019 10:06 AM CST -----  Contact: self [phone]  States that she is calling to inquire about the medication cholestyramine powder that she received. States that she also has the MRSA in her nose again and would like to have something called into the pharm. Pt uses     [facility] [place] [place] - [address]  [address] [address]  [place] [address]  Phone: [phone] Fax: [phone]    Please call back at [phone]//thank you acc

## 2019-12-13 NOTE — TELEPHONE ENCOUNTER
----- Message from Uli Cortez sent at 12/13/2019  1:16 PM CST -----  Contact: self [phone]  .Type:  Needs Medical Advice    Who Called: Rivka Adrien  Symptoms (please be specific): throat pain/recurring staph infection  How long has patient had these symptoms: 3 days  Pharmacy name and phone #:      [facility] [address] [place], [place] - [address] [employer]  [address] [facility] [address]  Phone: [phone] Fax: [phone]      Would the patient rather a call back or a response via [facility]? Call back  Best Call Back Number: [phone]  Additional Information: Pt would like to have antibiotic called to pharmacy

## 2019-12-15 RX ORDER — MUPIROCIN 20 MG/G
OINTMENT TOPICAL 2 TIMES DAILY
Qty: 30 G | Refills: 0 | Status: SHIPPED | OUTPATIENT
Start: 2019-12-15 | End: 2020-02-02

## 2019-12-16 ENCOUNTER — TELEPHONE (OUTPATIENT)
Dept: INTERNAL MEDICINE | Facility: CLINIC | Age: 59
End: 2019-12-16

## 2019-12-16 DIAGNOSIS — Z86.14 HISTORY OF MRSA INFECTION: Primary | ICD-10-CM

## 2019-12-16 DIAGNOSIS — Z86.14 HISTORY OF MRSA INFECTION: ICD-10-CM

## 2019-12-16 RX ORDER — SULFAMETHOXAZOLE AND TRIMETHOPRIM 800; 160 MG/1; MG/1
1 TABLET ORAL 2 TIMES DAILY
Qty: 10 TABLET | Refills: 0 | Status: SHIPPED | OUTPATIENT
Start: 2019-12-16 | End: 2019-12-16 | Stop reason: SDUPTHER

## 2019-12-16 RX ORDER — SULFAMETHOXAZOLE AND TRIMETHOPRIM 800; 160 MG/1; MG/1
1 TABLET ORAL 2 TIMES DAILY
Qty: 10 TABLET | Refills: 0 | Status: SHIPPED | OUTPATIENT
Start: 2019-12-16 | End: 2019-12-21

## 2019-12-16 NOTE — TELEPHONE ENCOUNTER
----- Message from Brenda Horn sent at 12/16/2019  7:39 AM CST -----  Contact: [phone]  Would like to consult with the nurse, Patient states that she has been calling, and would like to speak with the nurse due to lesion in her nose, Bad Cough,  Also fever, Patient Throat is burning and its is very Raw, Patient would like to speak with the nurse as soon as possible concerning getting something call in, Patient is very Upset that she cannot get anything call in, Patient states that if the Dr cannot help her she will find Another Dr, Please call back  At [phone], Thanks sj

## 2019-12-16 NOTE — TELEPHONE ENCOUNTER
Bactroban ointment was sent to pharmacy by Dr. Green.    I sent prescription for Bactrim DS 1 tablet twice a day for potential  MRSA infection/abscess.    If any worsening despite above, she will need to be seen.  Since the lesion of concern is in her nose, would recommend evaluation by ENT  If needed in case it needs to be drained.

## 2019-12-16 NOTE — TELEPHONE ENCOUNTER
Called pt to inform her of medication sent to pharmacy. She stated her MRSA has spread. She declined to come in. Pt wants something sent to pharmacy. Please advise.///ancelmo

## 2019-12-17 ENCOUNTER — CLINICAL SUPPORT (OUTPATIENT)
Dept: DIABETES | Facility: CLINIC | Age: 59
End: 2019-12-17
Payer: MEDICARE

## 2019-12-17 VITALS — BODY MASS INDEX: 49.11 KG/M2 | HEIGHT: 64 IN | WEIGHT: 287.69 LBS

## 2019-12-17 DIAGNOSIS — I12.9 HYPERTENSIVE CHRONIC KIDNEY DISEASE WITH STAGE 1 THROUGH STAGE 4 CHRONIC KIDNEY DISEASE, OR UNSPECIFIED CHRONIC KIDNEY DISEASE: Primary | ICD-10-CM

## 2019-12-17 PROCEDURE — 97803 MED NUTRITION INDIV SUBSEQ: CPT | Mod: S$GLB,,, | Performed by: DIETITIAN, REGISTERED

## 2019-12-17 PROCEDURE — 97803 PR MED NUTR THER, SUBSQ, INDIV, EA 15 MIN: ICD-10-PCS | Mod: S$GLB,,, | Performed by: DIETITIAN, REGISTERED

## 2019-12-17 NOTE — PROGRESS NOTES
"PCP: Kennedy Singh MD   REFERRING PROVIDER: Kennedy Singh MD     HISTORY OF PRESENT ILLNESS: 59 y.o. female patient is in clinic today for follow-up in weight management. Patient did not start recording her food intake and she did not implement changes discussed at last visit.     VITAL SIGNS:  Height: 5' 4" (162.6 cm)   Weight: 130.5 kg (287 lb 11.2 oz)   Body mass index is 49.38 kg/m².    2 pound weight gain since last visit.     ALLERGIES & MEDICATIONS: Reviewed.  MEDICAL/SURGICAL & FAMILY HISTORY: Reviewed.    LABORATORY:  Reviewed Diabetes Management Flowsheet and Results Review.    SELF-MONITORING: Food - none are avail. Patient knows she needs to start recording her food intake. Will download [employer] fabiano.      ACTIVITY LEVEL: Aerobic - none. Resistance - none. Patient uses a wheelchair and walker and gets out of breath when she walks short distances. Has back pain and brace on her foot. Can only do seated exercises. Has not started physical therapy or any seated exercises.     NUTRITION INTAKE: Patient feels like she has made positive changes. She cut out her afternoon coffee. Meal patterns include 2 meals, 1-2 snacks daily. Patient is not limiting carbohydrates, saturated fat or sodium. Inadequate intakes of fruits, vegetables, whole grains.  24 hour recall:  Was sick yesterday and didn't feel well.   B - 1/2 cup coffee and 1/2 cup creamer with 3 Tbsp of sugar   S - None.   L - Vanilla wafers throughout the day.   S - None.   D - Boneless chicken breast, rice-a-jordana, sweet peas, water  S - None.   Beverages - Water  Dining out - [employer] (small hamburger and about 4-5 french fries)// buffet at [employer].   Has snacked on a box of Cocoa Krispies until she finished the box.   Admits to having 4 ice cream cones since last visit and a piece of Ambrosia cake for Thanksgiving.       PSYCHOSOCIAL: Stage of change - contemplation  Barriers to change - Motivation, having healthy food available to eat. " Patient is not being accountable for her food intake. She is consuming more than she reports.     EDUCATIONAL ASSESSMENT:   1. Impediments in learning class environment - NONE.  2. Needs improvement in self care management skills.  -healthy eating  -being active  -self-monitoring  -problem solving  -reducing risks    MNT ASSESSMENT:   2103-1285 calories, 8 ounces protein daily  30 grams carb/meal, 15 grams carb/snack  increase fruit 2 serv/d, vegetables 2+ cups/d, whole grains 3+serv/d, lowfat dairy 1+serving/d  low-fat, low-sodium  150 min physical activity per week, moderate intensity, as tolerated (seated, upper body exercises)    PLAN:   Reviewed MNT guidelines for weight management. Provided 1400 calorie meal plan. Patient will continue to work on reducing (and eventually eliminate) sugar in coffee, no ice-cream, consistent meals throughout the day, and increase intake of non-starchy vegetables.   Encouraged daily self-monitoring of food and activity patterns, return records to clinic. Advised patient to record intake in smart phone fabiano.    Emphasized the importance of portion control and measuring all of her foods. Avoid eating out of bags or boxes. Portion out all foods before eating.    Discussed importance of daily physical activity with review of benefits, methods, guidelines and precautions.  · Discussed behavioral strategies for improving social and environmental support of lifestyle changes. Encouraged her to NOT give up.     GOALS: Self-monitoring: Daily food & activity journal. Meal Plan: 90% Accuracy. Activity:150 min/wk.     Visit Time Spent: 45 miinutes  Thank you for the opportunity to work with your patient.

## 2019-12-17 NOTE — LETTER
December 17, 2019        Kennedy Singh MD  [address] [facility] [address] [facility] [address]             [facility]  [address] [facility] [address]  Phone: [phone]  Fax: [phone]   Patient: Rivka Jurado   MR Number: [id number]   YOB: 1960   Date of Visit: 12/17/2019       Dear Dr. Singh:    Thank you for referring Rivka Jurado to me for evaluation. Below are the relevant portions of my assessment and plan of care.            If you have questions, please do not hesitate to call me. I look forward to following Rivka along with you.    Sincerely,      Megan Santoro RD, CDE           CC  No Recipients

## 2019-12-31 DIAGNOSIS — F41.9 ANXIETY: ICD-10-CM

## 2020-01-03 RX ORDER — CLONAZEPAM 1 MG/1
1 TABLET ORAL NIGHTLY
Qty: 90 TABLET | Refills: 0 | Status: SHIPPED | OUTPATIENT
Start: 2020-01-03 | End: 2020-03-18

## 2020-01-06 ENCOUNTER — TELEPHONE (OUTPATIENT)
Dept: INTERNAL MEDICINE | Facility: CLINIC | Age: 60
End: 2020-01-06

## 2020-01-06 NOTE — TELEPHONE ENCOUNTER
Returned call to pt. She stated she called pharmacy back after she left message with office, they found prescription.///jasminam

## 2020-01-07 ENCOUNTER — TELEPHONE (OUTPATIENT)
Dept: INTERNAL MEDICINE | Facility: CLINIC | Age: 60
End: 2020-01-07

## 2020-01-07 DIAGNOSIS — G57.91 NEUROPATHY OF RIGHT FOOT: ICD-10-CM

## 2020-01-07 DIAGNOSIS — R29.898 WEAKNESS OF RIGHT LOWER EXTREMITY: Primary | ICD-10-CM

## 2020-01-07 DIAGNOSIS — M21.371 RIGHT FOOT DROP: ICD-10-CM

## 2020-01-07 NOTE — TELEPHONE ENCOUNTER
----- Message from Munira Antonio sent at 1/7/2020 10:02 AM CST -----  Contact: Patient   Type:  Patient Requesting Referral    Who Called: Rivka  Does the patient already have the specialty appointment scheduled?: No   If yes, what is the date of that appointment?: Not schedule  Referral to What Specialty: Orthopedic Surgeon  Reason for Referral: [employer] is asking for one   Does the patient want the referral with a specific physician?: Dr. Joel Dalton  [facility] the [facility]  Patient Requesting a Response?: Yes  Would the patient rather a call back or a response via [facility]? Call back   Best Call Back Number: Please call her at [phone]  Additional Information: n/a

## 2020-01-07 NOTE — TELEPHONE ENCOUNTER
Pt is requesting a referral for Dr. Dalton ( Orthopedic Surgeon ). Pt changed insurance and they are requesting that she get a referral. She states she is in need of a anew boot and she has a toe fungus. Please advise.///smm

## 2020-01-07 NOTE — TELEPHONE ENCOUNTER
External referred Dr. Connors ordered for her leg weakness and foot drop.    He may or may not treat her toenail fungus.    For that she may need to see Podiatry.

## 2020-01-27 DIAGNOSIS — J34.0 NASAL ABSCESS: ICD-10-CM

## 2020-01-30 ENCOUNTER — PATIENT OUTREACH (OUTPATIENT)
Dept: ADMINISTRATIVE | Facility: HOSPITAL | Age: 60
End: 2020-01-30

## 2020-02-02 RX ORDER — MONTELUKAST SODIUM 10 MG/1
10 TABLET ORAL DAILY
COMMUNITY
Start: 2020-01-08 | End: 2020-06-01

## 2020-02-02 RX ORDER — VANCOMYCIN HYDROCHLORIDE 500 MG/10ML
INJECTION, POWDER, LYOPHILIZED, FOR SOLUTION INTRAVENOUS
COMMUNITY
Start: 2020-01-22 | End: 2020-09-15

## 2020-02-02 RX ORDER — MUPIROCIN 20 MG/G
OINTMENT TOPICAL 2 TIMES DAILY
Qty: 30 G | Refills: 0 | Status: SHIPPED | OUTPATIENT
Start: 2020-02-02

## 2020-02-12 ENCOUNTER — TELEPHONE (OUTPATIENT)
Dept: PAIN MEDICINE | Facility: CLINIC | Age: 60
End: 2020-02-12

## 2020-02-12 NOTE — TELEPHONE ENCOUNTER
----- Message from Munira Antonio sent at 2/12/2020  2:27 PM CST -----  Contact: Patient   Patient said that her insurance has change and she can't come in until March due to her new insurance and she needs to have another back injection and not sure on what to do. Please call her at [phone].    Thanks  Td

## 2020-02-12 NOTE — TELEPHONE ENCOUNTER
Due to insurance change over pt wanted to be added to procedure schedule not know if it would be covered her new insurance goes in effect March 1 pt decided to wait till then that way procedure would not be denied all questions answered//dp

## 2020-02-14 DIAGNOSIS — K52.9 CHRONIC DIARRHEA: ICD-10-CM

## 2020-02-14 RX ORDER — CHOLESTYRAMINE 4 G/9G
POWDER, FOR SUSPENSION ORAL
Qty: 378 G | Refills: 0 | Status: SHIPPED | OUTPATIENT
Start: 2020-02-14 | End: 2020-06-01

## 2020-02-17 DIAGNOSIS — I10 HYPERTENSION, ESSENTIAL: ICD-10-CM

## 2020-02-17 RX ORDER — SULFACETAMIDE SODIUM 100 MG/ML
LOTION TOPICAL
Qty: 118 ML | Refills: 3 | Status: SHIPPED | OUTPATIENT
Start: 2020-02-17

## 2020-02-17 RX ORDER — DULOXETIN HYDROCHLORIDE 60 MG/1
60 CAPSULE, DELAYED RELEASE ORAL DAILY
Qty: 90 CAPSULE | Refills: 3 | Status: SHIPPED | OUTPATIENT
Start: 2020-02-17 | End: 2020-09-15

## 2020-02-17 RX ORDER — BENAZEPRIL HYDROCHLORIDE 20 MG/1
20 TABLET ORAL DAILY
Qty: 90 TABLET | Refills: 3 | Status: SHIPPED | OUTPATIENT
Start: 2020-02-17 | End: 2021-02-16

## 2020-02-17 RX ORDER — FLUTICASONE PROPIONATE 50 MCG
2 SPRAY, SUSPENSION (ML) NASAL DAILY
Qty: 48 G | Refills: 3 | Status: SHIPPED | OUTPATIENT
Start: 2020-02-17 | End: 2021-02-16

## 2020-03-02 DIAGNOSIS — B37.2 CANDIDAL DERMATITIS: ICD-10-CM

## 2020-03-02 RX ORDER — FLUCONAZOLE 200 MG/1
200 TABLET ORAL
Qty: 8 TABLET | Refills: 3 | Status: SHIPPED | OUTPATIENT
Start: 2020-03-02 | End: 2020-08-17

## 2020-03-02 NOTE — TELEPHONE ENCOUNTER
Spoke to pt. She is requesting a refill on Diflucan. I informed pt I would pend the refill request to Dr. Singh. Pt verbalized understanding/jkamar

## 2020-03-02 NOTE — TELEPHONE ENCOUNTER
----- Message from Ann Lewis sent at 2/28/2020  2:57 PM CST -----  needs diflucan called in today...[phone].      [facility] [place] [facility] [place] - [address]  [address] [facility]  [place] [address]  Phone: [phone] Fax: [phone]

## 2020-03-05 ENCOUNTER — LAB VISIT (OUTPATIENT)
Dept: LAB | Facility: HOSPITAL | Age: 60
End: 2020-03-05
Attending: FAMILY MEDICINE
Payer: MEDICARE

## 2020-03-05 ENCOUNTER — OFFICE VISIT (OUTPATIENT)
Dept: INTERNAL MEDICINE | Facility: CLINIC | Age: 60
End: 2020-03-05
Payer: COMMERCIAL

## 2020-03-05 VITALS
DIASTOLIC BLOOD PRESSURE: 80 MMHG | SYSTOLIC BLOOD PRESSURE: 124 MMHG | BODY MASS INDEX: 49.49 KG/M2 | HEIGHT: 64 IN | TEMPERATURE: 98 F | WEIGHT: 289.88 LBS | RESPIRATION RATE: 16 BRPM | HEART RATE: 88 BPM

## 2020-03-05 DIAGNOSIS — F41.9 ANXIETY: Chronic | ICD-10-CM

## 2020-03-05 DIAGNOSIS — M06.09 RHEUMATOID ARTHRITIS, SERONEGATIVE, MULTIPLE SITES: ICD-10-CM

## 2020-03-05 DIAGNOSIS — Z13.6 ENCOUNTER FOR SCREENING FOR CARDIOVASCULAR DISORDERS: ICD-10-CM

## 2020-03-05 DIAGNOSIS — Z23 NEED FOR PNEUMOCOCCAL VACCINATION: ICD-10-CM

## 2020-03-05 DIAGNOSIS — M79.7 FIBROMYALGIA: ICD-10-CM

## 2020-03-05 DIAGNOSIS — Z12.31 ENCOUNTER FOR SCREENING MAMMOGRAM FOR MALIGNANT NEOPLASM OF BREAST: ICD-10-CM

## 2020-03-05 DIAGNOSIS — L72.3 SEBACEOUS CYST: ICD-10-CM

## 2020-03-05 DIAGNOSIS — I10 HYPERTENSION, ESSENTIAL: Chronic | ICD-10-CM

## 2020-03-05 DIAGNOSIS — F33.1 MAJOR DEPRESSIVE DISORDER, RECURRENT EPISODE, MODERATE: Chronic | ICD-10-CM

## 2020-03-05 DIAGNOSIS — R41.3 MEMORY DIFFICULTY: Primary | ICD-10-CM

## 2020-03-05 LAB
ALBUMIN SERPL BCP-MCNC: 3.5 G/DL (ref 3.5–5.2)
ALP SERPL-CCNC: 108 U/L (ref 55–135)
ALT SERPL W/O P-5'-P-CCNC: 10 U/L (ref 10–44)
ANION GAP SERPL CALC-SCNC: 13 MMOL/L (ref 8–16)
AST SERPL-CCNC: 13 U/L (ref 10–40)
BASOPHILS # BLD AUTO: 0.07 K/UL (ref 0–0.2)
BASOPHILS NFR BLD: 0.8 % (ref 0–1.9)
BILIRUB SERPL-MCNC: 0.5 MG/DL (ref 0.1–1)
BUN SERPL-MCNC: 20 MG/DL (ref 6–20)
CALCIUM SERPL-MCNC: 9.6 MG/DL (ref 8.7–10.5)
CHLORIDE SERPL-SCNC: 98 MMOL/L (ref 95–110)
CHOLEST SERPL-MCNC: 159 MG/DL (ref 120–199)
CHOLEST/HDLC SERPL: 4.2 {RATIO} (ref 2–5)
CO2 SERPL-SCNC: 26 MMOL/L (ref 23–29)
CREAT SERPL-MCNC: 1.7 MG/DL (ref 0.5–1.4)
CRP SERPL-MCNC: 58.7 MG/L (ref 0–8.2)
DIFFERENTIAL METHOD: ABNORMAL
EOSINOPHIL # BLD AUTO: 0.4 K/UL (ref 0–0.5)
EOSINOPHIL NFR BLD: 4.3 % (ref 0–8)
ERYTHROCYTE [DISTWIDTH] IN BLOOD BY AUTOMATED COUNT: 13.3 % (ref 11.5–14.5)
ERYTHROCYTE [SEDIMENTATION RATE] IN BLOOD BY WESTERGREN METHOD: 24 MM/HR (ref 0–36)
EST. GFR  (AFRICAN AMERICAN): 37.5 ML/MIN/1.73 M^2
EST. GFR  (NON AFRICAN AMERICAN): 32.5 ML/MIN/1.73 M^2
GLUCOSE SERPL-MCNC: 109 MG/DL (ref 70–110)
HCT VFR BLD AUTO: 37.7 % (ref 37–48.5)
HDLC SERPL-MCNC: 38 MG/DL (ref 40–75)
HDLC SERPL: 23.9 % (ref 20–50)
HGB BLD-MCNC: 11.6 G/DL (ref 12–16)
IMM GRANULOCYTES # BLD AUTO: 0.04 K/UL (ref 0–0.04)
IMM GRANULOCYTES NFR BLD AUTO: 0.5 % (ref 0–0.5)
LDLC SERPL CALC-MCNC: 95 MG/DL (ref 63–159)
LYMPHOCYTES # BLD AUTO: 1.4 K/UL (ref 1–4.8)
LYMPHOCYTES NFR BLD: 17.1 % (ref 18–48)
MCH RBC QN AUTO: 28 PG (ref 27–31)
MCHC RBC AUTO-ENTMCNC: 30.8 G/DL (ref 32–36)
MCV RBC AUTO: 91 FL (ref 82–98)
MONOCYTES # BLD AUTO: 0.7 K/UL (ref 0.3–1)
MONOCYTES NFR BLD: 7.8 % (ref 4–15)
NEUTROPHILS # BLD AUTO: 5.9 K/UL (ref 1.8–7.7)
NEUTROPHILS NFR BLD: 69.5 % (ref 38–73)
NONHDLC SERPL-MCNC: 121 MG/DL
NRBC BLD-RTO: 0 /100 WBC
PLATELET # BLD AUTO: 398 K/UL (ref 150–350)
PMV BLD AUTO: 9.9 FL (ref 9.2–12.9)
POTASSIUM SERPL-SCNC: 4.5 MMOL/L (ref 3.5–5.1)
PROT SERPL-MCNC: 8 G/DL (ref 6–8.4)
RBC # BLD AUTO: 4.15 M/UL (ref 4–5.4)
SODIUM SERPL-SCNC: 137 MMOL/L (ref 136–145)
TRIGL SERPL-MCNC: 130 MG/DL (ref 30–150)
TSH SERPL DL<=0.005 MIU/L-ACNC: 0.94 UIU/ML (ref 0.4–4)
WBC # BLD AUTO: 8.43 K/UL (ref 3.9–12.7)

## 2020-03-05 PROCEDURE — 86038 ANTINUCLEAR ANTIBODIES: CPT

## 2020-03-05 PROCEDURE — 85652 RBC SED RATE AUTOMATED: CPT

## 2020-03-05 PROCEDURE — 86431 RHEUMATOID FACTOR QUANT: CPT

## 2020-03-05 PROCEDURE — 90471 IMMUNIZATION ADMIN: CPT | Mod: PBBFAC

## 2020-03-05 PROCEDURE — 99215 PR OFFICE/OUTPT VISIT, EST, LEVL V, 40-54 MIN: ICD-10-PCS | Mod: S$PBB,,, | Performed by: FAMILY MEDICINE

## 2020-03-05 PROCEDURE — 99499 UNLISTED E&M SERVICE: CPT | Mod: S$PBB,,, | Performed by: FAMILY MEDICINE

## 2020-03-05 PROCEDURE — 99215 OFFICE O/P EST HI 40 MIN: CPT | Mod: S$PBB,,, | Performed by: FAMILY MEDICINE

## 2020-03-05 PROCEDURE — 36415 COLL VENOUS BLD VENIPUNCTURE: CPT

## 2020-03-05 PROCEDURE — 80061 LIPID PANEL: CPT

## 2020-03-05 PROCEDURE — 85025 COMPLETE CBC W/AUTO DIFF WBC: CPT

## 2020-03-05 PROCEDURE — 84443 ASSAY THYROID STIM HORMONE: CPT

## 2020-03-05 PROCEDURE — 80053 COMPREHEN METABOLIC PANEL: CPT

## 2020-03-05 PROCEDURE — 99999 PR PBB SHADOW E&M-EST. PATIENT-LVL V: ICD-10-PCS | Mod: PBBFAC,,, | Performed by: FAMILY MEDICINE

## 2020-03-05 PROCEDURE — 86140 C-REACTIVE PROTEIN: CPT

## 2020-03-05 PROCEDURE — 99499 RISK ADDL DX/OHS AUDIT: ICD-10-PCS | Mod: S$PBB,,, | Performed by: FAMILY MEDICINE

## 2020-03-05 PROCEDURE — 86235 NUCLEAR ANTIGEN ANTIBODY: CPT

## 2020-03-05 PROCEDURE — 99999 PR PBB SHADOW E&M-EST. PATIENT-LVL V: CPT | Mod: PBBFAC,,, | Performed by: FAMILY MEDICINE

## 2020-03-05 NOTE — PROGRESS NOTES
Subjective:   Patient ID: Rivka Jurado is a 59 y.o. female.  Chief Complaint:  c/o memory loss      Patient presents with multiple complaints / concerns and requesting specialty referrals.      Biggest issue is memory / cognitive difficulty.  Reports significant decline over the past 6 months.  Does not feel related to any mental health issues.  No recent medication changes.  Significant family history of dementia.  Would like to see Neurology.      History of seronegative rheumatoid arthritis and fibromyalgia.  Over the past 12-18 months has been relatively stable from pain standpoint except for low back /spine /leg issues.  However recent increased and significant poly arthralgias and myalgias.  Reports treatment with methotrexate, multiple DMARDs, multiple injectable agents in past by Rheumatology.  No recent autoimmune panel on file.  Would like to re-establish with Rheumatology.    Has cyst on back.  Increasing size, pain, discomfort.  No drainage.  Chronic.  Would like removed.    Hypertension has been well controlled.    Health maintenance needs include:  Lipid panel  Mammogram  Colon cancer screening  Prevnar and Shingles Vaccine    Review of Systems   Constitutional: Positive for fatigue. Negative for activity change, appetite change, chills and fever.   Eyes: Negative for visual disturbance.   Respiratory: Negative for cough, chest tightness, shortness of breath and wheezing.    Cardiovascular: Negative for chest pain, palpitations and leg swelling.   Gastrointestinal: Negative for abdominal pain, constipation, diarrhea, nausea and vomiting.   Genitourinary: Negative for difficulty urinating and pelvic pain.   Musculoskeletal: Positive for arthralgias, back pain, gait problem, joint swelling, myalgias and neck stiffness. Negative for neck pain.   Skin: Negative for rash.   Neurological: Positive for weakness. Negative for dizziness, tremors, seizures, syncope, facial asymmetry, speech difficulty,  "light-headedness, numbness and headaches.   Hematological: Negative for adenopathy. Bruises/bleeds easily.   Psychiatric/Behavioral: Positive for confusion. Negative for agitation, behavioral problems, decreased concentration, dysphoric mood, hallucinations, self-injury, sleep disturbance and suicidal ideas. The patient is not nervous/anxious and is not hyperactive.      Objective:   /80 (BP Location: Right arm, Patient Position: Sitting)   Pulse 88   Temp 98 °F (36.7 °C) (Oral)   Resp 16   Ht 5' 4" (1.626 m)   Wt 131.5 kg (289 lb 14.5 oz)   BMI 49.76 kg/m²     Physical Exam   Constitutional: Vital signs are normal. She appears well-developed and well-nourished. She is cooperative.  Non-toxic appearance. She does not have a sickly appearance. She does not appear ill. No distress.   Morbid obesity   HENT:   Nose: Nose normal. No mucosal edema or rhinorrhea.   Mouth/Throat: Oropharynx is clear and moist and mucous membranes are normal.   Eyes: Right conjunctiva is not injected. Left conjunctiva is not injected. No scleral icterus.   Neck: No JVD present. Carotid bruit is not present. No thyroid mass and no thyromegaly present.   Cardiovascular: Normal rate, regular rhythm and normal heart sounds. Exam reveals no gallop and no friction rub.   No murmur heard.  Pulmonary/Chest: Effort normal and breath sounds normal. No tachypnea and no bradypnea. No respiratory distress. She has no wheezes. She has no rhonchi. She has no rales.   Abdominal: Soft. She exhibits no distension. There is no hepatosplenomegaly. There is no tenderness. There is no rebound and no guarding.   Musculoskeletal: She exhibits no edema.   Neurological: Gait abnormal.   Skin: Skin is warm, dry and intact. No abrasion, no bruising, no ecchymosis and no rash noted.    [place] sized sebaceous cyst left upper back.    Central core/ head but no drainage  Not red or warm  Tender to palpation    Psychiatric: She has a normal mood and affect. Her " speech is normal and behavior is normal. Judgment and thought content normal. Her mood appears not anxious. Her affect is not angry, not blunt, not labile and not inappropriate. She is not agitated, not aggressive, not hyperactive, not slowed, not withdrawn, not actively hallucinating and not combative. Thought content is not paranoid and not delusional. Cognition and memory are normal. She does not exhibit a depressed mood. She expresses no homicidal and no suicidal ideation. She is attentive.   Nursing note and vitals reviewed.    Assessment:       ICD-10-CM ICD-9-CM   1. Memory difficulty R41.3 780.93   2. Rheumatoid arthritis, seronegative, multiple sites M06.09 714.0   3. Fibromyalgia M79.7 729.1   4. Hypertension, essential I10 401.9   5. Major depressive disorder, recurrent episode, moderate F33.1 296.32   6. Anxiety F41.9 300.00   7. Encounter for screening for cardiovascular disorders Z13.6 V81.2   8. Encounter for screening mammogram for malignant neoplasm of breast Z12.31 V76.12   9. Need for pneumococcal vaccination Z23 V03.82   10. Sebaceous cyst L72.3 706.2     Plan:   Memory difficulty  -     Ambulatory referral/consult to Neurology; Future; Expected date: 03/12/2020  Referral to Neurology for dementia evaluation     Rheumatoid arthritis, seronegative, multiple sites  Fibromyalgia  -     Ambulatory referral/consult to Rheumatology; Future; Expected date: 03/12/2020  -     CBC auto differential; Future; Expected date: 03/05/2020  -     Comprehensive metabolic panel; Future; Expected date: 03/05/2020  -     Sedimentation rate; Future; Expected date: 03/05/2020  -     C-reactive protein; Future; Expected date: 03/05/2020  -     TRINA; Future; Expected date: 03/05/2020  -     Rheumatoid factor; Future; Expected date: 03/05/2020  -     Cyclic citrul peptide antibody, IgG; Future; Expected date: 03/05/2020  -     Sjogrens syndrome-A extractable nuclear antibody; Future; Expected date: 03/05/2020  -     TSH;  Future; Expected date: 03/05/2020  Autoimmune labs ordered   Referral to Rheumatology     Hypertension, essential  Controlled.  BP at goal  Continue present medications     Major depressive disorder, recurrent episode, moderate  Anxiety  Stable.  Symptoms controlled.    Continue all present medications  Follow-up Psychiatry as scheduled     RHM  -     Lipid panel; Future; Expected date: 03/05/2020  -     Mammo Digital Screening Bilat; Future; Expected date: 03/05/2020  -     (In Office Administered) Pneumococcal Conjugate Vaccine (13 Valent) (IM)  Plans to obtain shingles vaccine through pharmacy  Plans to schedule colonoscopy with Dr. Alfaro     Sebaceous cyst  -     Ambulatory referral/consult to Dermatology; Future; Expected date: 03/12/2020    Follow-up/ tablets with all specialists as scheduled   Return to clinic 6 months or sooner as needed

## 2020-03-06 ENCOUNTER — TELEPHONE (OUTPATIENT)
Dept: INTERNAL MEDICINE | Facility: CLINIC | Age: 60
End: 2020-03-06

## 2020-03-06 DIAGNOSIS — N18.9 CHRONIC KIDNEY DISEASE, UNSPECIFIED CKD STAGE: ICD-10-CM

## 2020-03-06 LAB
ANA SER QL IF: NORMAL
RHEUMATOID FACT SERPL-ACNC: <10 IU/ML (ref 0–15)

## 2020-03-06 NOTE — TELEPHONE ENCOUNTER
----- Message from Mishel Chan sent at 3/6/2020  8:18 AM CST -----  Contact: pt   Type:  Test Results    Who Called: pt  Name of Test (Lab/Mammo/Etc): labs  Date of Test: 03/05  Ordering Provider: Dr Singh  Where the test was performed: [facility]  Would the patient rather a call back or a response via [facility]? Call back  Best Call Back Number: [phone]  Additional Information:

## 2020-03-06 NOTE — TELEPHONE ENCOUNTER
Spoke to pt, inquiring ab-ut lab results. Informed pt results are not back yet, I informed pt when the results are released I will give her a call. Pt verbalized understanding/robbi

## 2020-03-07 LAB
ANTI-SSA ANTIBODY: 0.08 RATIO (ref 0–0.99)
ANTI-SSA INTERPRETATION: NEGATIVE

## 2020-03-10 ENCOUNTER — PATIENT OUTREACH (OUTPATIENT)
Dept: ADMINISTRATIVE | Facility: OTHER | Age: 60
End: 2020-03-10

## 2020-03-10 ENCOUNTER — TELEPHONE (OUTPATIENT)
Dept: INTERNAL MEDICINE | Facility: CLINIC | Age: 60
End: 2020-03-10

## 2020-03-10 NOTE — TELEPHONE ENCOUNTER
----- Message from Juliette Maldonado sent at 3/10/2020  8:08 AM CDT -----  Contact: Self- [phone]  .Type:  Test Results    Who Called: Rivka Jurado  Name of Test (Lab/Mammo/Etc): LAB   Date of Test: 3/05/20  Ordering Provider:    Where the test was performed: [facility]   Would the patient rather a call back or a response via [facility]? Call back   Best Call Back Number: .[phone] (home)   Additional Information:    3rd attempt

## 2020-03-11 ENCOUNTER — LAB VISIT (OUTPATIENT)
Dept: LAB | Facility: HOSPITAL | Age: 60
End: 2020-03-11
Attending: STUDENT IN AN ORGANIZED HEALTH CARE EDUCATION/TRAINING PROGRAM
Payer: COMMERCIAL

## 2020-03-11 ENCOUNTER — HOSPITAL ENCOUNTER (OUTPATIENT)
Dept: RADIOLOGY | Facility: HOSPITAL | Age: 60
Discharge: HOME | End: 2020-03-11
Attending: STUDENT IN AN ORGANIZED HEALTH CARE EDUCATION/TRAINING PROGRAM
Payer: COMMERCIAL

## 2020-03-11 ENCOUNTER — OFFICE VISIT (OUTPATIENT)
Dept: RHEUMATOLOGY | Facility: CLINIC | Age: 60
End: 2020-03-11
Payer: COMMERCIAL

## 2020-03-11 VITALS
WEIGHT: 293 LBS | DIASTOLIC BLOOD PRESSURE: 84 MMHG | HEART RATE: 119 BPM | SYSTOLIC BLOOD PRESSURE: 155 MMHG | HEIGHT: 64 IN | BODY MASS INDEX: 50.02 KG/M2

## 2020-03-11 DIAGNOSIS — M06.09 RHEUMATOID ARTHRITIS, SERONEGATIVE, MULTIPLE SITES: ICD-10-CM

## 2020-03-11 DIAGNOSIS — M79.7 FIBROMYALGIA: ICD-10-CM

## 2020-03-11 DIAGNOSIS — N28.9 KIDNEY DISEASE: ICD-10-CM

## 2020-03-11 DIAGNOSIS — N28.9 KIDNEY DISEASE: Primary | ICD-10-CM

## 2020-03-11 PROCEDURE — 80074 ACUTE HEPATITIS PANEL: CPT

## 2020-03-11 PROCEDURE — 73130 XR HAND COMPLETE 3 VIEWS BILATERAL: ICD-10-PCS | Mod: 26,RT,, | Performed by: RADIOLOGY

## 2020-03-11 PROCEDURE — 3008F PR BODY MASS INDEX (BMI) DOCUMENTED: ICD-10-PCS | Mod: CPTII,S$GLB,, | Performed by: STUDENT IN AN ORGANIZED HEALTH CARE EDUCATION/TRAINING PROGRAM

## 2020-03-11 PROCEDURE — 99999 PR PBB SHADOW E&M-EST. PATIENT-LVL V: CPT | Mod: PBBFAC,,, | Performed by: STUDENT IN AN ORGANIZED HEALTH CARE EDUCATION/TRAINING PROGRAM

## 2020-03-11 PROCEDURE — 99499 RISK ADDL DX/OHS AUDIT: ICD-10-PCS | Mod: S$GLB,,, | Performed by: STUDENT IN AN ORGANIZED HEALTH CARE EDUCATION/TRAINING PROGRAM

## 2020-03-11 PROCEDURE — 73130 X-RAY EXAM OF HAND: CPT | Mod: 26,LT,, | Performed by: RADIOLOGY

## 2020-03-11 PROCEDURE — 99204 PR OFFICE/OUTPT VISIT, NEW, LEVL IV, 45-59 MIN: ICD-10-PCS | Mod: S$GLB,,, | Performed by: STUDENT IN AN ORGANIZED HEALTH CARE EDUCATION/TRAINING PROGRAM

## 2020-03-11 PROCEDURE — 3077F SYST BP >= 140 MM HG: CPT | Mod: CPTII,S$GLB,, | Performed by: STUDENT IN AN ORGANIZED HEALTH CARE EDUCATION/TRAINING PROGRAM

## 2020-03-11 PROCEDURE — 36415 COLL VENOUS BLD VENIPUNCTURE: CPT

## 2020-03-11 PROCEDURE — 73130 X-RAY EXAM OF HAND: CPT | Mod: TC,50

## 2020-03-11 PROCEDURE — 99204 OFFICE O/P NEW MOD 45 MIN: CPT | Mod: S$GLB,,, | Performed by: STUDENT IN AN ORGANIZED HEALTH CARE EDUCATION/TRAINING PROGRAM

## 2020-03-11 PROCEDURE — 3079F DIAST BP 80-89 MM HG: CPT | Mod: CPTII,S$GLB,, | Performed by: STUDENT IN AN ORGANIZED HEALTH CARE EDUCATION/TRAINING PROGRAM

## 2020-03-11 PROCEDURE — 99499 UNLISTED E&M SERVICE: CPT | Mod: S$GLB,,, | Performed by: STUDENT IN AN ORGANIZED HEALTH CARE EDUCATION/TRAINING PROGRAM

## 2020-03-11 PROCEDURE — 3008F BODY MASS INDEX DOCD: CPT | Mod: CPTII,S$GLB,, | Performed by: STUDENT IN AN ORGANIZED HEALTH CARE EDUCATION/TRAINING PROGRAM

## 2020-03-11 PROCEDURE — 99999 PR PBB SHADOW E&M-EST. PATIENT-LVL V: ICD-10-PCS | Mod: PBBFAC,,, | Performed by: STUDENT IN AN ORGANIZED HEALTH CARE EDUCATION/TRAINING PROGRAM

## 2020-03-11 PROCEDURE — 3079F PR MOST RECENT DIASTOLIC BLOOD PRESSURE 80-89 MM HG: ICD-10-PCS | Mod: CPTII,S$GLB,, | Performed by: STUDENT IN AN ORGANIZED HEALTH CARE EDUCATION/TRAINING PROGRAM

## 2020-03-11 PROCEDURE — 73130 X-RAY EXAM OF HAND: CPT | Mod: 26,RT,, | Performed by: RADIOLOGY

## 2020-03-11 PROCEDURE — 3077F PR MOST RECENT SYSTOLIC BLOOD PRESSURE >= 140 MM HG: ICD-10-PCS | Mod: CPTII,S$GLB,, | Performed by: STUDENT IN AN ORGANIZED HEALTH CARE EDUCATION/TRAINING PROGRAM

## 2020-03-11 RX ORDER — PREDNISONE 5 MG/1
5 TABLET ORAL DAILY
Qty: 30 TABLET | Refills: 0 | Status: SHIPPED | OUTPATIENT
Start: 2020-03-11 | End: 2020-04-27 | Stop reason: SDUPTHER

## 2020-03-11 NOTE — LETTER
March 15, 2020      Kennedy Singh MD  [address]  [place] [address]           [facility]  [address]  [place] [address]  Phone: [phone]  Fax: [phone]          Patient: Rivka Jurado   MR Number: [id number]   YOB: 1960   Date of Visit: 3/11/2020       Dear Dr. Kennedy Singh:    Thank you for referring Rivka Jurado to me for evaluation. Attached you will find relevant portions of my assessment and plan of care.    If you have questions, please do not hesitate to call me. I look forward to following Rivka Jurado along with you.    Sincerely,    Ynes Williamson MD    Enclosure  CC:  No Recipients    If you would like to receive this communication electronically, please contact [email or web address] or [phone] to request more information on [facility] access.    For providers and/or their staff who would like to refer a patient to [facility], please contact us through our one-stop-shop provider referral line, [facility] [place], at [phone].    If you feel you have received this communication in error or would no longer like to receive these types of communications, please e-mail [email or web address]         
Statement Selected

## 2020-03-11 NOTE — PROGRESS NOTES
RHEUMATOLOGY OUTPATIENT CLINIC NOTE        Attending Rheumatologist: Ynes Williamson  Primary Care Provider: Kennedy Singh MD   Physician Requesting Consultation: Kennedy Singh MD  [address]  [place] [address]  Chief Complaint/Reason For Consultation:  No chief complaint on file.      Subjective:       HPI  Rivka Jurado is a 59 y.o. White female with rheumatoid arthritis.  Previous very complicated history starting several years ago.    -TKR--> surgery done , then had repetitive falls which ultimately led to dislocated knee-->severed popliteal artery-->compartment syndrome--> right foot drop. Bedridden 23 months   -Mtx/PLQ  -Enbrel-->  -Remicade  Kineret:      14pt ROS negative except as otherwise stated above    Review of Systems   Constitutional: Negative for chills and fever.   HENT: Negative for congestion and hearing loss.    Eyes: Negative for blurred vision and pain.   Respiratory: Negative for cough and sputum production.    Cardiovascular: Negative for chest pain and leg swelling.   Gastrointestinal: Negative for abdominal pain and heartburn.   Genitourinary: Negative for dysuria and hematuria.   Musculoskeletal: Positive for back pain, falls, joint pain, myalgias and neck pain.   Skin: Negative for itching and rash.   Neurological: Negative for dizziness, sensory change, seizures and weakness.   Endo/Heme/Allergies: Negative for environmental allergies. Does not bruise/bleed easily.   Psychiatric/Behavioral: Negative for depression. The patient is not nervous/anxious and does not have insomnia.        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    Pancreatitis 03/18/2019    [place]  [facility]    Recurrent UTI 10/13/2017    Right bundle branch block     Right foot drop 10/13/2017    Urinary incontinence 10/13/2017    Weakness of right lower extremity 10/13/2017     Past Surgical History:   Procedure Laterality Date    BREAST SURGERY      breast lump removed    CARDIAC CATHETERIZATION Right 03/2018    wrist    CHOLECYSTECTOMY      ENDOSCOPIC ULTRASOUND OF UPPER GASTROINTESTINAL TRACT N/A 4/9/2019    Procedure: ULTRASOUND, UPPER GI TRACT, ENDOSCOPIC;  Surgeon: Demario Menjivar MD;  Location: [facility] ENDO;  Service: Endoscopy;  Laterality: N/A;    ENDOSCOPIC ULTRASOUND OF UPPER GASTROINTESTINAL TRACT N/A 5/28/2019    Procedure: ULTRASOUND, UPPER GI TRACT, ENDOSCOPIC;  Surgeon: Justin Munoz MD;  Location: [facility] ENDO;  Service: Endoscopy;  Laterality: N/A;    foot drop surgery      HYSTERECTOMY      INJECTION OF ANESTHETIC AGENT AROUND MEDIAL BRANCH NERVES INNERVATING LUMBAR FACET JOINT Bilateral 8/9/2019    Procedure: Block-nerve-medial branch-lumbar;  Surgeon: Fady Salmon MD;  Location: [facility] PAIN MGT;  Service: Pain Management;  Laterality: Bilateral;    INJECTION OF ANESTHETIC AGENT AROUND MEDIAL BRANCH NERVES INNERVATING LUMBAR FACET JOINT Bilateral 11/5/2019    Procedure: Bilateral L3-5 MBB;  Surgeon: Fady Salmon MD;  Location: [facility] PAIN MGT;  Service: Pain Management;  Laterality: Bilateral;    KNEE SURGERY      13     RECTAL SURGERY      TONSILLECTOMY       Family History   Problem Relation Age of Onset    Colon cancer Mother     Prostate cancer Father     Cancer Sister      Social History     Substance and Sexual Activity   Alcohol Use No     Social History     Tobacco Use   Smoking Status Never Smoker   Smokeless Tobacco Never Used     Social History     Substance and Sexual Activity   Drug Use Never       Current Outpatient Medications:     benazepriL (LOTENSIN) 20 MG tablet, Take 1 tablet (20 mg total) by mouth once daily., Disp: 90 tablet, Rfl: 3     cholestyramine, with sugar, 4 gram Powd, TAKE 4 GRAMS BY MOUTH EVERY DAY, Disp: 378 g, Rfl: 0    clonazePAM (KLONOPIN) 1 MG tablet, Take 1 tablet (1 mg total) by mouth every evening., Disp: 90 tablet, Rfl: 0    clotrimazole-betamethasone 1-0.05% (LOTRISONE) cream, Apply topically 2 (two) times daily as needed., Disp: 45 g, Rfl: 11    CREON 24,000-76,000 -120,000 unit capsule, Take 1 capsule by mouth once daily., Disp: , Rfl:     DULoxetine (CYMBALTA) 60 MG capsule, Take 1 capsule (60 mg total) by mouth once daily., Disp: 90 capsule, Rfl: 3    esomeprazole (NEXIUM) 40 MG capsule, Take 1 capsule (40 mg total) by mouth before breakfast., Disp: 90 capsule, Rfl: 3    fluconazole (DIFLUCAN) 200 MG Tab, Take 1 tablet (200 mg total) by mouth twice a week., Disp: 8 tablet, Rfl: 3    fluticasone propionate (FLONASE) 50 mcg/actuation nasal spray, 2 sprays (100 mcg total) by Each Nostril route once daily., Disp: 48 g, Rfl: 3    hydroCHLOROthiazide (HYDRODIURIL) 25 MG tablet, Take 1 tablet (25 mg total) by mouth once daily., Disp: 30 tablet, Rfl: 11    hydrocortisone (PROCTOSOL HC) 2.5 % rectal cream, Place rectally 2 (two) times daily as needed for Hemorrhoids., Disp: 28 g, Rfl: 11    LINZESS 72 mcg Cap capsule, Take 1 capsule by mouth once daily., Disp: , Rfl:     metroNIDAZOLE (METROGEL) 0.75 % gel, AAA of face bid, Disp: 45 g, Rfl: 3    montelukast (SINGULAIR) 10 mg tablet, Take 10 mg by mouth once daily., Disp: , Rfl:     mupirocin (BACTROBAN) 2 % ointment, Apply topically 2 (two) times daily., Disp: 30 g, Rfl: 0    naproxen (NAPROSYN) 500 MG tablet, Take 1 tablet (500 mg total) by mouth 2 (two) times daily with meals., Disp: 180 tablet, Rfl: 0    nystatin (MYCOSTATIN) powder, Apply topically 2 (two) times daily., Disp: 60 g, Rfl: 11    nystatin-triamcinolone (MYCOLOG II) cream, Apply topically 2 (two) times daily., Disp: 60 g, Rfl: 3    sulfacetamide sodium, acne, 10 % Susp, APPLY 1 APPLICATION TOPICALLY  EVERY DAY, Disp: 118 mL, Rfl: 3    tolterodine (DETROL LA) 4 MG 24 hr capsule, Take 1 capsule (4 mg total) by mouth every evening., Disp: 90 capsule, Rfl: 3    vancomycin (VANCOCIN) 500 mg injection, , Disp: , Rfl:     pregabalin (LYRICA) 75 MG capsule, Take 1 capsule (75 mg total) by mouth 2 (two) times daily. (Patient taking differently: Take 75 mg by mouth once daily. ), Disp: 180 capsule, Rfl: 3    valACYclovir (VALTREX) 1000 MG tablet, Take 1 tablet (1,000 mg total) by mouth once daily., Disp: 90 tablet, Rfl: 3       Objective:         Vitals:    03/11/20 1603   BP: (!) 155/84   Pulse: (!) 119     Physical Exam   Nursing note and vitals reviewed.  Constitutional: She is oriented to person, place, and time and well-developed, well-nourished, and in no distress.   HENT:   Head: Normocephalic.   Mouth/Throat: Oropharynx is clear and moist.   Eyes: Conjunctivae are normal. Pupils are equal, round, and reactive to light.   Neck: Normal range of motion. Neck supple.   Cardiovascular: Normal rate and normal heart sounds.    Pulmonary/Chest: Effort normal. No respiratory distress.   Abdominal: Soft. There is no tenderness.   Neurological: She is alert and oriented to person, place, and time.   Skin: Skin is warm. No rash noted. No erythema.     Psychiatric: Memory and affect normal.   Musculoskeletal:   No synovitis or effusion of small joints. No effusion over large joints.         Reviewed old and all outside pertinent medical records available.    All lab results personally reviewed and interpreted by me.  Lab Results   Component Value Date    WBC 8.43 03/05/2020    HGB 11.6 (L) 03/05/2020    HCT 37.7 03/05/2020    MCV 91 03/05/2020    MCH 28.0 03/05/2020    MCHC 30.8 (L) 03/05/2020    RDW 13.3 03/05/2020     (H) 03/05/2020    MPV 9.9 03/05/2020       Lab Results   Component Value Date     03/05/2020    K 4.5 03/05/2020    CL 98 03/05/2020    CO2 26 03/05/2020     03/05/2020    BUN 20  03/05/2020    CALCIUM 9.6 03/05/2020    PROT 8.0 03/05/2020    ALBUMIN 3.5 03/05/2020    BILITOT 0.5 03/05/2020    AST 13 03/05/2020    ALKPHOS 108 03/05/2020    ALT 10 03/05/2020       Lab Results   Component Value Date    COLORU Yellow 10/10/2017    APPEARANCEUA Clear 10/10/2017    SPECGRAV <=1.005 (A) 10/10/2017    PHUR 6.0 10/10/2017    PROTEINUA Negative 10/10/2017    KETONESU Negative 10/10/2017    LEUKOCYTESUR 1+ (A) 10/10/2017    NITRITE Negative 10/10/2017       Lab Results   Component Value Date    CRP 58.7 (H) 03/05/2020       Lab Results   Component Value Date    SEDRATE 24 03/05/2020       Lab Results   Component Value Date    RF <10.0 03/05/2020    SEDRATE 24 03/05/2020       No components found for: 25OHVITDTOT, 38BZMSIV5, 62LWMREM1, METHODNOTE    No results found for: URICACID    No components found for: TSPOTTB      Imaging:     ASSESSMENT / PLAN:     Rivka Jurado is a 59 y.o. White female with:      1. Rheumatoid arthritis, seronegative, multiple sites  -States she had previously been treated w numerous meds as noted above and had been in remission. Will get all previous records. Appears stable at this time. No synovitis on exam. Symptoms more consitent w fibromyalgia.      2. Fibromyalgia  -+Diffuse achiness/pain, +fatigue, +significant stress in personal life  -Discussed importance of proper sleep hygiene and exercise. Recommend low impact aerobics.    Time spent: 45 minutes in face to face discussion concerning diagnosis, prognosis, review of lab and test results, benefits of treatment as well as management of disease, counseling of patient and coordination of care between various health care providers . Greater than half the time spent was used for coordination of care and counseling of patient.    Method of contact with patient concerns: [facility]      Ynes Williamson M.D.  Rheumatology Department   [facility] [place] [address], [place]  [address]  Phone: [phone]  Fax: [phone]

## 2020-03-12 ENCOUNTER — TELEPHONE (OUTPATIENT)
Dept: RHEUMATOLOGY | Facility: CLINIC | Age: 60
End: 2020-03-12

## 2020-03-12 NOTE — TELEPHONE ENCOUNTER
----- Message from Kely Gay sent at 3/12/2020  9:04 AM CDT -----  Contact: pt  Please call pt @ [phone], pt have questions for nurse/Desmond regarding visit yesterday, pt states she have appt with Urology/Dr Loyd, states labs need to be fax to them @ [phone], pt states she will be back next week to take TB test.

## 2020-03-12 NOTE — TELEPHONE ENCOUNTER
Spoke with Ms. Jurado informed her that when all her labs are resulted Dr. Williamson will call with results.

## 2020-03-13 ENCOUNTER — DOCUMENTATION ONLY (OUTPATIENT)
Dept: RHEUMATOLOGY | Facility: CLINIC | Age: 60
End: 2020-03-13

## 2020-03-13 LAB
HAV IGM SERPL QL IA: NEGATIVE
HBV CORE IGM SERPL QL IA: NEGATIVE
HBV SURFACE AG SERPL QL IA: NEGATIVE
HCV AB SERPL QL IA: NEGATIVE

## 2020-03-13 NOTE — NURSING
Signed completed authorization for release of confidential information faxed to Dr. Palomino/Dr. TOMASA Camarillo/Dr. Dalton at [phone] and also faxed to Dr. Gema Sanchez @ [phone]

## 2020-03-16 ENCOUNTER — PATIENT OUTREACH (OUTPATIENT)
Dept: ADMINISTRATIVE | Facility: OTHER | Age: 60
End: 2020-03-16

## 2020-03-16 ENCOUNTER — TELEPHONE (OUTPATIENT)
Dept: RHEUMATOLOGY | Facility: CLINIC | Age: 60
End: 2020-03-16

## 2020-03-16 NOTE — TELEPHONE ENCOUNTER
----- Message from Cande Beltran sent at 3/16/2020  3:51 PM CDT -----  Contact: pt  Patient states she just saw Dr Loyd urologist. He has a note for him giving has approval to starts treatment . Patient call back [phone]

## 2020-03-16 NOTE — PROGRESS NOTES
Patient, Rivka Jurado (MRN #[id number]), presented with a recorded BMI of 50.59 kg/m^2 consistent with the definition of morbid obesity (ICD-10 E66.01). The patient's morbid obesity was monitored, evaluated, addressed and/or treated. This addendum to the medical record is made on 03/16/2020.

## 2020-03-17 ENCOUNTER — TELEPHONE (OUTPATIENT)
Dept: PAIN MEDICINE | Facility: CLINIC | Age: 60
End: 2020-03-17

## 2020-03-17 RX ORDER — BACLOFEN 10 MG/1
10 TABLET ORAL NIGHTLY PRN
Qty: 30 TABLET | Refills: 3 | Status: SHIPPED | OUTPATIENT
Start: 2020-03-17 | End: 2020-09-15

## 2020-03-17 NOTE — TELEPHONE ENCOUNTER
----- Message from Fady Salmon MD sent at 3/17/2020  9:00 AM CDT -----  Contact: pt  I would continue cymbalta and naproxen if they are helping.  Physical therapy exercises can help.  We can also add some baclofen and see if that helps.  I'll send some in to the pharmacy.      ----- Message -----  From: Rina Caraballo LPN  Sent: 3/16/2020   4:38 PM CDT  To: MD Kyle Wood,     I just spoke to pt. Pt c/o lower back pain bilaterally. Pt denies radicular pain at this time. Pt requesting injection. Informed pt that at this point all elective procedures will be cancelled until further notice. Pt would like to know what she can do until injection can be scheduled. Please advise.      ----- Message -----  From: Cande Beltran  Sent: 3/16/2020   3:53 PM CDT  To: Viky Shrestha Staff    Patient states she needs an injection in back she was just seen in January. Patient call back at [phone]

## 2020-03-17 NOTE — TELEPHONE ENCOUNTER
Contacted pt. Informed pt baclofen sent to   [email or web address] [place] - [address] [phone] (Phone)     Pt states she can not take naproxen d/t recent mild kidney issues.  notified. All questions answered.//lp

## 2020-03-18 DIAGNOSIS — F41.9 ANXIETY: ICD-10-CM

## 2020-03-18 RX ORDER — CLONAZEPAM 1 MG/1
1 TABLET ORAL NIGHTLY
Qty: 30 TABLET | Refills: 0 | Status: SHIPPED | OUTPATIENT
Start: 2020-03-18 | End: 2020-04-02

## 2020-03-31 ENCOUNTER — TELEPHONE (OUTPATIENT)
Dept: INTERNAL MEDICINE | Facility: CLINIC | Age: 60
End: 2020-03-31

## 2020-03-31 NOTE — TELEPHONE ENCOUNTER
----- Message from Shayy Mnoge sent at 3/31/2020  9:47 AM CDT -----  Contact: patient   Patient states she has a lump in her left breast.Would like to discuss with Dr. Singh.Please call back at [phone]        Thanks,  Shayy Monge

## 2020-03-31 NOTE — TELEPHONE ENCOUNTER
Pt states she found a marble size lump in her left breast a few days after her screening mammogram was cancelled. Pt wanted you to be aware and ask if it wise to wait until after the Covid 19 pandemic is over. Also, the patient stated she has had to increase her Klonopin to twice daily due to the amount of anxiety she is dealing with. I informed pt I would forward the message and call her back with advice. Pt verbalized understanding/robbi

## 2020-03-31 NOTE — TELEPHONE ENCOUNTER
Unless the area in question is red, painful, a rapidly increasing in size  at this time she will need to wait until after 4/22/2020 to schedule a mammogram.  If any of those develop  she would need an ultrasound not a mammogram.    If none of those are present, nothing could be ordered without a visit where it was determined she needed to test stat /as soon as possible which is not like the case.      If she is having to take Klonopin twice daily, she needs to think about increasing her Cymbalta dose.

## 2020-04-01 DIAGNOSIS — F41.9 ANXIETY: ICD-10-CM

## 2020-04-02 RX ORDER — CLONAZEPAM 1 MG/1
1 TABLET ORAL 2 TIMES DAILY
Qty: 60 TABLET | Refills: 0 | Status: SHIPPED | OUTPATIENT
Start: 2020-04-02 | End: 2020-08-17

## 2020-04-13 DIAGNOSIS — R32 URINARY INCONTINENCE, UNSPECIFIED TYPE: ICD-10-CM

## 2020-04-13 DIAGNOSIS — A60.00 GENITAL HERPES SIMPLEX, UNSPECIFIED SITE: ICD-10-CM

## 2020-04-13 DIAGNOSIS — N39.0 RECURRENT UTI: ICD-10-CM

## 2020-04-13 RX ORDER — VALACYCLOVIR HYDROCHLORIDE 1 G/1
1000 TABLET, FILM COATED ORAL DAILY
Qty: 90 TABLET | Refills: 3 | Status: SHIPPED | OUTPATIENT
Start: 2020-04-13 | End: 2021-04-13

## 2020-04-13 RX ORDER — TOLTERODINE 4 MG/1
4 CAPSULE, EXTENDED RELEASE ORAL NIGHTLY
Qty: 90 CAPSULE | Refills: 3 | Status: SHIPPED | OUTPATIENT
Start: 2020-04-13 | End: 2021-04-13

## 2020-04-13 RX ORDER — TIZANIDINE 4 MG/1
8 TABLET ORAL DAILY
Qty: 180 TABLET | Refills: 0 | Status: SHIPPED | OUTPATIENT
Start: 2020-04-13 | End: 2020-07-06

## 2020-04-13 RX ORDER — TIZANIDINE 4 MG/1
8 TABLET ORAL DAILY
Qty: 180 TABLET | Refills: 0 | Status: CANCELLED | OUTPATIENT
Start: 2020-04-13

## 2020-04-13 RX ORDER — NITROFURANTOIN 25; 75 MG/1; MG/1
100 CAPSULE ORAL NIGHTLY
Qty: 90 CAPSULE | Refills: 3 | Status: SHIPPED | OUTPATIENT
Start: 2020-04-13 | End: 2021-04-13

## 2020-04-13 RX ORDER — NAPROXEN 500 MG/1
500 TABLET ORAL 2 TIMES DAILY WITH MEALS
Qty: 180 TABLET | Refills: 0 | Status: SHIPPED | OUTPATIENT
Start: 2020-04-13 | End: 2020-04-29

## 2020-04-13 NOTE — TELEPHONE ENCOUNTER
----- Message from Tariq Bey sent at 4/13/2020  1:18 PM CDT -----  Contact: pt   .Type:  RX Refill Request    Who Called:  Pt   Refill or New Rx refill   RX Name and Strength: xanaflex   How is the patient currently taking it? (ex. 1XDay): 2 x day   Is this a 30 day or 90 day RX: 90 days   Preferred Pharmacy with phone number:[facility]   Local or Mail Order: local   Ordering Provider:alonzo   Would the patient rather a call back or a response via [facility]? Callback   Best Call Back Number: .[phone] (home)    Additional Information:         [facility] [place] - [place] - [address] [address]  [place] [address]  Phone: [phone] Fax: [phone]

## 2020-04-14 ENCOUNTER — OFFICE VISIT (OUTPATIENT)
Dept: PAIN MEDICINE | Facility: CLINIC | Age: 60
End: 2020-04-14
Payer: COMMERCIAL

## 2020-04-14 ENCOUNTER — TELEPHONE (OUTPATIENT)
Dept: PAIN MEDICINE | Facility: CLINIC | Age: 60
End: 2020-04-14

## 2020-04-14 ENCOUNTER — PATIENT OUTREACH (OUTPATIENT)
Dept: ADMINISTRATIVE | Facility: OTHER | Age: 60
End: 2020-04-14

## 2020-04-14 DIAGNOSIS — M47.816 LUMBAR SPONDYLOSIS: Primary | ICD-10-CM

## 2020-04-14 PROCEDURE — 99442 PR PHYSICIAN TELEPHONE EVALUATION 11-20 MIN: CPT | Mod: 95,,, | Performed by: PAIN MEDICINE

## 2020-04-14 PROCEDURE — 99442 PR PHYSICIAN TELEPHONE EVALUATION 11-20 MIN: ICD-10-PCS | Mod: 95,,, | Performed by: PAIN MEDICINE

## 2020-04-14 RX ORDER — TRAMADOL HYDROCHLORIDE 50 MG/1
50 TABLET ORAL EVERY 8 HOURS PRN
Qty: 90 TABLET | Refills: 1 | Status: SHIPPED | OUTPATIENT
Start: 2020-04-14 | End: 2020-04-21

## 2020-04-14 RX ORDER — GUAIFENESIN AND PHENYLEPHRINE HCL 400; 10 MG/1; MG/1
500 TABLET ORAL 2 TIMES DAILY
Qty: 60 CAPSULE | Refills: 3 | Status: ON HOLD | COMMUNITY
Start: 2020-04-14 | End: 2020-08-11

## 2020-04-14 NOTE — PROGRESS NOTES
Chronic Pain-Telephone-Established Note (Follow up visit)   The patient location is:  At home   The chief complaint leading to consultation is:  Low back pain   Visit type: Virtual visit with synchronous audio  Total time spent with patient:  10 min 57 sec   Each patient to whom he or she provides medical services by telemedicine is: (1) informed of the relationship between the physician and patient and the respective role of any other health care provider with respect to management of the patient; and (2) notified that he or she may decline to receive medical services by telemedicine and may withdraw from such care at any time.   Notes:   SUBJECTIVE:   Rivka Jurado presents tele-medicine appointment for a follow-up appointment for low back pain. Since the last visit, Rivka Jurado states the pain has been stable.  She underwent bilateral lumbar medial branch blocks with steroid in November and reports excellent symptomatic pain relief until fairly recently 1 symptoms restarted.  She has been evaluated by Rheumatology and it was noted that she had elevated creatinine and decreased GFR therefore she was stopped on naproxen and started on prednisone 5 mg tablets.  She reports since stopping the naproxen her symptoms have increased and worsened.  She denies having radiating symptoms in her legs.  She is worried that this could produce a fall has heard for her which she is very wary of as she has already had numerous surgeries on her leg from previous falls.  Her symptoms are worse with activity and changes in the weather and somewhat improved with rest.  Pain Medications:   - Opioids:  None   - NSAIDs:  None   - Anti-Depressants:  Cymbalta   - Anti-Convulsants:  None   - Others:  Baclofen, Klonopin, Prednisone         Physical Therapy/Home Exercise: no    report: Reviewed and consistent with medication use as prescribed.   Pain Procedures:  Bilateral lumbar medial branch blocks with steroid   Imaging:  Lumbar  spine x-ray   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/2017    [facility]    [facility]    Recurrent UTI 10/13/2017    Right bundle branch block     Right foot drop 10/13/2017    Urinary incontinence 10/13/2017    Weakness of right lower extremity 10/13/2017      Past Surgical History:   Procedure Laterality Date    BREAST SURGERY      breast lump removed    CARDIAC CATHETERIZATION Right 03/2018    wrist    CHOLECYSTECTOMY      ENDOSCOPIC ULTRASOUND OF UPPER GASTROINTESTINAL TRACT N/A 4/9/2019    Procedure: ULTRASOUND, UPPER GI TRACT, ENDOSCOPIC;  Surgeon: Demario Menjivar MD;  [facility];  Service: Endoscopy;  Laterality: N/A;    ENDOSCOPIC ULTRASOUND OF UPPER GASTROINTESTINAL TRACT N/A 5/28/2019    Procedure: ULTRASOUND, UPPER GI TRACT, ENDOSCOPIC;  Surgeon: Justin Munoz MD;  Location: [facility];  Service: Endoscopy;  Laterality: N/A;    foot drop surgery      HYSTERECTOMY      INJECTION OF ANESTHETIC AGENT AROUND MEDIAL BRANCH NERVES INNERVATING LUMBAR FACET JOINT Bilateral 8/9/2019    Procedure: Block-nerve-medial branch-lumbar;  Surgeon: Fady Salmon MD;  Location: [facility] PAIN MGT;  Service: Pain Management;  Laterality: Bilateral;    INJECTION OF ANESTHETIC AGENT AROUND MEDIAL BRANCH NERVES INNERVATING LUMBAR FACET JOINT Bilateral 11/5/2019    Procedure: Bilateral L3-5 MBB;  Surgeon: Fady Salmon MD;  Location: [facility] PAIN MGT;  Service: Pain Management;  Laterality: Bilateral;    KNEE SURGERY      13     RECTAL SURGERY      TONSILLECTOMY        Social History     Socioeconomic History    Marital status:      Spouse name: Not on file    Number of children: Not on file    Years of education: Not on file    Highest  education level: Not on file   Occupational History    Not on file   Social Needs    Financial resource strain: Not on file    Food insecurity:     Worry: Not on file     Inability: Not on file    Transportation needs:     Medical: Not on file     Non-medical: Not on file   Tobacco Use    Smoking status: Never Smoker    Smokeless tobacco: Never Used   Substance and Sexual Activity    Alcohol use: No    Drug use: Never    Sexual activity: Not Currently     Partners: Male   Lifestyle    Physical activity:     Days per week: Not on file     Minutes per session: Not on file    Stress: Not on file   Relationships    Social connections:     Talks on phone: Not on file     Gets together: Not on file     Attends Taoism service: Not on file     Active member of club or organization: Not on file     Attends meetings of clubs or organizations: Not on file     Relationship status: Not on file   Other Topics Concern    Not on file   Social History Narrative    Not on file      Family History   Problem Relation Age of Onset    Colon cancer Mother     Prostate cancer Father     Cancer Sister       Review of patient's allergies indicates:   Allergen Reactions    Clindamycin Diarrhea and Nausea And Vomiting    Lisinopril Other (See Comments)     Low BP    Morphine Nausea And Vomiting    Stadol [butorphanol tartrate] Hallucinations      Current Outpatient Medications   Medication Sig    baclofen (LIORESAL) 10 MG tablet Take 1 tablet (10 mg total) by mouth nightly as needed.    benazepriL (LOTENSIN) 20 MG tablet Take 1 tablet (20 mg total) by mouth once daily.    cholestyramine, with sugar, 4 gram Powd TAKE 4 GRAMS BY MOUTH EVERY DAY    clonazePAM (KLONOPIN) 1 MG tablet Take 1 tablet (1 mg total) by mouth 2 (two) times daily.    clotrimazole-betamethasone 1-0.05% (LOTRISONE) cream Apply topically 2 (two) times daily as needed.    CREON 24,000-76,000 -120,000 unit capsule Take 1 capsule by mouth once  daily.    DULoxetine (CYMBALTA) 60 MG capsule Take 1 capsule (60 mg total) by mouth once daily.    esomeprazole (NEXIUM) 40 MG capsule Take 1 capsule (40 mg total) by mouth before breakfast.    fluconazole (DIFLUCAN) 200 MG Tab Take 1 tablet (200 mg total) by mouth twice a week.    fluticasone propionate (FLONASE) 50 mcg/actuation nasal spray 2 sprays (100 mcg total) by Each Nostril route once daily.    hydroCHLOROthiazide (HYDRODIURIL) 25 MG tablet Take 1 tablet (25 mg total) by mouth once daily.    hydrocortisone (PROCTOSOL HC) 2.5 % rectal cream Place rectally 2 (two) times daily as needed for Hemorrhoids.    LINZESS 72 mcg Cap capsule Take 1 capsule by mouth once daily.    metroNIDAZOLE (METROGEL) 0.75 % gel AAA of face bid    montelukast (SINGULAIR) 10 mg tablet Take 10 mg by mouth once daily.    mupirocin (BACTROBAN) 2 % ointment Apply topically 2 (two) times daily.    naproxen (NAPROSYN) 500 MG tablet Take 1 tablet (500 mg total) by mouth 2 (two) times daily with meals.    nitrofurantoin, macrocrystal-monohydrate, (MACROBID) 100 MG capsule Take 1 capsule (100 mg total) by mouth every evening.    nystatin (MYCOSTATIN) powder Apply topically 2 (two) times daily.    nystatin-triamcinolone (MYCOLOG II) cream Apply topically 2 (two) times daily.    predniSONE (DELTASONE) 5 MG tablet Take 1 tablet (5 mg total) by mouth once daily.    pregabalin (LYRICA) 75 MG capsule Take 1 capsule (75 mg total) by mouth 2 (two) times daily. (Patient taking differently: Take 75 mg by mouth once daily. )    sulfacetamide sodium, acne, 10 % Susp APPLY 1 APPLICATION TOPICALLY EVERY DAY    tiZANidine (ZANAFLEX) 4 MG tablet Take 2 tablets (8 mg total) by mouth once daily.    tolterodine (DETROL LA) 4 MG 24 hr capsule Take 1 capsule (4 mg total) by mouth every evening.    traMADoL (ULTRAM) 50 mg tablet Take 1 tablet (50 mg total) by mouth every 8 (eight) hours as needed for Pain. Greater than 7 day supply medically  necessary.    turmeric root extract 500 mg Cap Take 500 mg by mouth 2 (two) times daily.    valACYclovir (VALTREX) 1000 MG tablet Take 1 tablet (1,000 mg total) by mouth once daily.    vancomycin (VANCOCIN) 500 mg injection      No current facility-administered medications for this visit.       REVIEW OF SYSTEMS:   GENERAL: No weight loss, malaise or fevers.   HEENT: No recent changes in vision or hearing   NECK: Negative for lumps, no difficulty with swallowing.   RESPIRATORY: Negative for cough, wheezing or shortness of breath, patient denies any recent URI.   CARDIOVASCULAR: Negative for chest pain, leg swelling or palpitations.   GI: Negative for abdominal discomfort, blood in stools or black stools or change in bowel habits.   MUSCULOSKELETAL: See HPI.   SKIN: Negative for lesions, rash, and itching.   PSYCH: No mood disorder or recent psychosocial stressors. Patients sleep is not disturbed secondary to pain.   HEMATOLOGY/LYMPHOLOGY: Negative for prolonged bleeding, bruising easily or swollen nodes. Patient is not currently taking any anti-coagulants   NEURO: No history of headaches, syncope, paralysis, seizures or tremors.   All other reviewed and negative other than HPI.   OBJECTIVE:   Psych:  Normal affect to  Respiratory:  Nonlabored breathing    ASSESSMENT: 59 y.o. year old female with low back pain, consistent with   Lumbar spondylosis  PLAN:   -will start tramadol 50 mg tablets 3 times daily as needed  -prescribed turmeric 500 mg twice daily as needed  -will schedule for bilateral lumbar medial branch blocks targeting the L4/5 and L5/S1 facet joints with steroid  -follow up in clinic 4 weeks after the injection  The above plan and management options were discussed at length with patient. Patient is in agreement with the above and verbalized understanding. It will be communicated with the referring physician via electronic record, fax, or mail.   Fady Salmon   04/14/2020

## 2020-04-14 NOTE — TELEPHONE ENCOUNTER
----- Message from Michelle Brandt sent at 4/14/2020  9:54 AM CDT -----  Contact: pt  .Name of Caller  pt   Reason for Visit/Symptoms pain   Best Contact Number or Confirm if Mychart Preferred  .[phone] (home)     Preferred Date/Time of Appointment -  Interested in Virtual Visit (yes/no) no, audio   Additional Information pt does not want pain pills, she needs something (maybe the patch  )prescribed for pain pt stated she suffering

## 2020-04-14 NOTE — H&P (VIEW-ONLY)
Chronic Pain-Telephone-Established Note (Follow up visit)   The patient location is:  At home   The chief complaint leading to consultation is:  Low back pain   Visit type: Virtual visit with synchronous audio  Total time spent with patient:  10 min 57 sec   Each patient to whom he or she provides medical services by telemedicine is: (1) informed of the relationship between the physician and patient and the respective role of any other health care provider with respect to management of the patient; and (2) notified that he or she may decline to receive medical services by telemedicine and may withdraw from such care at any time.   Notes:   SUBJECTIVE:   Rivka Jurado presents tele-medicine appointment for a follow-up appointment for low back pain. Since the last visit, Rivka Jurado states the pain has been stable.  She underwent bilateral lumbar medial branch blocks with steroid in November and reports excellent symptomatic pain relief until fairly recently 1 symptoms restarted.  She has been evaluated by Rheumatology and it was noted that she had elevated creatinine and decreased GFR therefore she was stopped on naproxen and started on prednisone 5 mg tablets.  She reports since stopping the naproxen her symptoms have increased and worsened.  She denies having radiating symptoms in her legs.  She is worried that this could produce a fall has heard for her which she is very wary of as she has already had numerous surgeries on her leg from previous falls.  Her symptoms are worse with activity and changes in the weather and somewhat improved with rest.  Pain Medications:   - Opioids:  None   - NSAIDs:  None   - Anti-Depressants:  Cymbalta   - Anti-Convulsants:  None   - Others:  Baclofen, Klonopin, Prednisone         Physical Therapy/Home Exercise: no    report: Reviewed and consistent with medication use as prescribed.   Pain Procedures:  Bilateral lumbar medial branch blocks with steroid   Imaging:  Lumbar  spine x-ray   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/2017    [facility]    Recurrent UTI 10/13/2017    Right bundle branch block     Right foot drop 10/13/2017    Urinary incontinence 10/13/2017    Weakness of right lower extremity 10/13/2017      Past Surgical History:   Procedure Laterality Date    BREAST SURGERY      breast lump removed    CARDIAC CATHETERIZATION Right 03/2018    wrist    CHOLECYSTECTOMY      ENDOSCOPIC ULTRASOUND OF UPPER GASTROINTESTINAL TRACT N/A 4/9/2019    Procedure: ULTRASOUND, UPPER GI TRACT, ENDOSCOPIC;  Surgeon: Demario Menjivar MD;  Location: [facility] ENDO;  Service: Endoscopy;  Laterality: N/A;    ENDOSCOPIC ULTRASOUND OF UPPER GASTROINTESTINAL TRACT N/A 5/28/2019    Procedure: ULTRASOUND, UPPER GI TRACT, ENDOSCOPIC;  Surgeon: Justin Munoz MD;  Location[facility];  Service: Endoscopy;  Laterality: N/A;    foot drop surgery      HYSTERECTOMY      INJECTION OF ANESTHETIC AGENT AROUND MEDIAL BRANCH NERVES INNERVATING LUMBAR FACET JOINT Bilateral 8/9/2019    Procedure: Block-nerve-medial branch-lumbar;  Surgeon: Fady Salmon MD;  Location: [facility] PAIN MGT;  Service: Pain Management;  Laterality: Bilateral;    INJECTION OF ANESTHETIC AGENT AROUND MEDIAL BRANCH NERVES INNERVATING LUMBAR FACET JOINT Bilateral 11/5/2019    Procedure: Bilateral L3-5 MBB;  Surgeon: Fady Salmon MD;  Location: [facility] PAIN MGT;  Service: Pain Management;  Laterality: Bilateral;    KNEE SURGERY      13     RECTAL SURGERY      TONSILLECTOMY        Social History     Socioeconomic History    Marital status:      Spouse name: Not on file    Number of children: Not on file    Years of education: Not on file    Highest  education level: Not on file   Occupational History    Not on file   Social Needs    Financial resource strain: Not on file    Food insecurity:     Worry: Not on file     Inability: Not on file    Transportation needs:     Medical: Not on file     Non-medical: Not on file   Tobacco Use    Smoking status: Never Smoker    Smokeless tobacco: Never Used   Substance and Sexual Activity    Alcohol use: No    Drug use: Never    Sexual activity: Not Currently     Partners: Male   Lifestyle    Physical activity:     Days per week: Not on file     Minutes per session: Not on file    Stress: Not on file   Relationships    Social connections:     Talks on phone: Not on file     Gets together: Not on file     Attends Mandaen service: Not on file     Active member of club or organization: Not on file     Attends meetings of clubs or organizations: Not on file     Relationship status: Not on file   Other Topics Concern    Not on file   Social History Narrative    Not on file      Family History   Problem Relation Age of Onset    Colon cancer Mother     Prostate cancer Father     Cancer Sister       Review of patient's allergies indicates:   Allergen Reactions    Clindamycin Diarrhea and Nausea And Vomiting    Lisinopril Other (See Comments)     Low BP    Morphine Nausea And Vomiting    Stadol [butorphanol tartrate] Hallucinations      Current Outpatient Medications   Medication Sig    baclofen (LIORESAL) 10 MG tablet Take 1 tablet (10 mg total) by mouth nightly as needed.    benazepriL (LOTENSIN) 20 MG tablet Take 1 tablet (20 mg total) by mouth once daily.    cholestyramine, with sugar, 4 gram Powd TAKE 4 GRAMS BY MOUTH EVERY DAY    clonazePAM (KLONOPIN) 1 MG tablet Take 1 tablet (1 mg total) by mouth 2 (two) times daily.    clotrimazole-betamethasone 1-0.05% (LOTRISONE) cream Apply topically 2 (two) times daily as needed.    CREON 24,000-76,000 -120,000 unit capsule Take 1 capsule by mouth once  daily.    DULoxetine (CYMBALTA) 60 MG capsule Take 1 capsule (60 mg total) by mouth once daily.    esomeprazole (NEXIUM) 40 MG capsule Take 1 capsule (40 mg total) by mouth before breakfast.    fluconazole (DIFLUCAN) 200 MG Tab Take 1 tablet (200 mg total) by mouth twice a week.    fluticasone propionate (FLONASE) 50 mcg/actuation nasal spray 2 sprays (100 mcg total) by Each Nostril route once daily.    hydroCHLOROthiazide (HYDRODIURIL) 25 MG tablet Take 1 tablet (25 mg total) by mouth once daily.    hydrocortisone (PROCTOSOL HC) 2.5 % rectal cream Place rectally 2 (two) times daily as needed for Hemorrhoids.    LINZESS 72 mcg Cap capsule Take 1 capsule by mouth once daily.    metroNIDAZOLE (METROGEL) 0.75 % gel AAA of face bid    montelukast (SINGULAIR) 10 mg tablet Take 10 mg by mouth once daily.    mupirocin (BACTROBAN) 2 % ointment Apply topically 2 (two) times daily.    naproxen (NAPROSYN) 500 MG tablet Take 1 tablet (500 mg total) by mouth 2 (two) times daily with meals.    nitrofurantoin, macrocrystal-monohydrate, (MACROBID) 100 MG capsule Take 1 capsule (100 mg total) by mouth every evening.    nystatin (MYCOSTATIN) powder Apply topically 2 (two) times daily.    nystatin-triamcinolone (MYCOLOG II) cream Apply topically 2 (two) times daily.    predniSONE (DELTASONE) 5 MG tablet Take 1 tablet (5 mg total) by mouth once daily.    pregabalin (LYRICA) 75 MG capsule Take 1 capsule (75 mg total) by mouth 2 (two) times daily. (Patient taking differently: Take 75 mg by mouth once daily. )    sulfacetamide sodium, acne, 10 % Susp APPLY 1 APPLICATION TOPICALLY EVERY DAY    tiZANidine (ZANAFLEX) 4 MG tablet Take 2 tablets (8 mg total) by mouth once daily.    tolterodine (DETROL LA) 4 MG 24 hr capsule Take 1 capsule (4 mg total) by mouth every evening.    traMADoL (ULTRAM) 50 mg tablet Take 1 tablet (50 mg total) by mouth every 8 (eight) hours as needed for Pain. Greater than 7 day supply medically  necessary.    turmeric root extract 500 mg Cap Take 500 mg by mouth 2 (two) times daily.    valACYclovir (VALTREX) 1000 MG tablet Take 1 tablet (1,000 mg total) by mouth once daily.    vancomycin (VANCOCIN) 500 mg injection      No current facility-administered medications for this visit.       REVIEW OF SYSTEMS:   GENERAL: No weight loss, malaise or fevers.   HEENT: No recent changes in vision or hearing   NECK: Negative for lumps, no difficulty with swallowing.   RESPIRATORY: Negative for cough, wheezing or shortness of breath, patient denies any recent URI.   CARDIOVASCULAR: Negative for chest pain, leg swelling or palpitations.   GI: Negative for abdominal discomfort, blood in stools or black stools or change in bowel habits.   MUSCULOSKELETAL: See HPI.   SKIN: Negative for lesions, rash, and itching.   PSYCH: No mood disorder or recent psychosocial stressors. Patients sleep is not disturbed secondary to pain.   HEMATOLOGY/LYMPHOLOGY: Negative for prolonged bleeding, bruising easily or swollen nodes. Patient is not currently taking any anti-coagulants   NEURO: No history of headaches, syncope, paralysis, seizures or tremors.   All other reviewed and negative other than HPI.   OBJECTIVE:   Psych:  Normal affect to  Respiratory:  Nonlabored breathing    ASSESSMENT: 59 y.o. year old female with low back pain, consistent with   Lumbar spondylosis  PLAN:   -will start tramadol 50 mg tablets 3 times daily as needed  -prescribed turmeric 500 mg twice daily as needed  -will schedule for bilateral lumbar medial branch blocks targeting the L4/5 and L5/S1 facet joints with steroid  -follow up in clinic 4 weeks after the injection  The above plan and management options were discussed at length with patient. Patient is in agreement with the above and verbalized understanding. It will be communicated with the referring physician via electronic record, fax, or mail.   Fady Salmon   04/14/2020

## 2020-04-14 NOTE — PATIENT INSTRUCTIONS
-will start tramadol 50 mg tablets 3 times daily as needed  -will start turmeric 500 mg tablets twice daily  -will schedule for bilateral lumbar medial branch blocks targeting L4/5 and L5/S1 facet joints with steroid  -continue all medications as prescribed  -follow up in clinic 4 weeks after the injections

## 2020-04-16 RX ORDER — TIZANIDINE 4 MG/1
TABLET ORAL
Qty: 180 TABLET | Refills: 0 | OUTPATIENT
Start: 2020-04-16

## 2020-04-20 RX ORDER — TIZANIDINE 4 MG/1
TABLET ORAL
Qty: 180 TABLET | Refills: 0 | OUTPATIENT
Start: 2020-04-20

## 2020-04-21 ENCOUNTER — TELEPHONE (OUTPATIENT)
Dept: PAIN MEDICINE | Facility: CLINIC | Age: 60
End: 2020-04-21

## 2020-04-21 RX ORDER — PREDNISONE 5 MG/1
5 TABLET ORAL DAILY
Qty: 30 TABLET | Refills: 0 | OUTPATIENT
Start: 2020-04-21

## 2020-04-21 RX ORDER — TRAMADOL HYDROCHLORIDE 50 MG/1
50 TABLET ORAL EVERY 8 HOURS PRN
Qty: 90 TABLET | Refills: 1 | Status: SHIPPED | OUTPATIENT
Start: 2020-04-21 | End: 2020-05-21

## 2020-04-21 NOTE — PROGRESS NOTES
Patient contacted the clinic as she was having difficulty filling the recent prescription of tramadol that was prescribed by Dr. Salmon on 04/14/2020 at her [facility] pharmacy.  [facility]  I reviewed the  and it is consistent with patient's history.  Therefore I changed the order of the Tramadol 50 mg Q 8 p.r.n. Pain,# 90 tablets, 1 refill to be filled at new pharmacy per patient request.    Anjel Jennings MD  Interventional Pain Medicine  [facility] [place]

## 2020-04-21 NOTE — TELEPHONE ENCOUNTER
----- Message from Felicitas Lara sent at 4/21/2020  8:29 AM CDT -----  Contact: pt  States [facility] told her there is an interaction with tramadol and her klonopin. [facility] states they called Dr Salmon. States she has taking OxyContin with klononpin and she's never had a problem. States she would like to have it sent to [facility].    Pt uses .  [facility] -- [place] - [address]  [address] [place] [address]  Phone: [phone] Fax: [phone]    Please call pt [phone]. Thank you

## 2020-04-27 NOTE — TELEPHONE ENCOUNTER
----- Message from Tariq Sotero sent at 4/27/2020  3:46 PM CDT -----  Contact: pt   ..Type:  RX Refill Request    Who Called:  Pt   Refill or New Rx refill   RX Name and Strength: prednisone  How is the patient currently taking it? (ex. 1XDay): 1 day   Is this a 30 day or 90 day RX: 30 days   Preferred Pharmacy with phone number: [facility]   Local or Mail Order: local   Ordering Provider: feroz arias   Would the patient rather a call back or a response via [facility]?  [facility]   Best Call Back Number: .[phone] (home)    Additional Information:        .  [facility] -- [place] [facility] - [place] - [address] [place] [address]  Phone: [phone] Fax: [phone]

## 2020-04-28 ENCOUNTER — PATIENT OUTREACH (OUTPATIENT)
Dept: ADMINISTRATIVE | Facility: OTHER | Age: 60
End: 2020-04-28

## 2020-04-28 RX ORDER — PREDNISONE 5 MG/1
5 TABLET ORAL DAILY
Qty: 30 TABLET | Refills: 0 | Status: SHIPPED | OUTPATIENT
Start: 2020-04-28 | End: 2020-07-29 | Stop reason: ALTCHOICE

## 2020-04-29 ENCOUNTER — TELEPHONE (OUTPATIENT)
Dept: PAIN MEDICINE | Facility: CLINIC | Age: 60
End: 2020-04-29

## 2020-04-29 DIAGNOSIS — Z03.818 ENCOUNTER FOR OBSERVATION FOR SUSPECTED EXPOSURE TO OTHER BIOLOGICAL AGENTS RULED OUT: Primary | ICD-10-CM

## 2020-04-29 DIAGNOSIS — R11.2 NON-INTRACTABLE VOMITING WITH NAUSEA, UNSPECIFIED VOMITING TYPE: ICD-10-CM

## 2020-04-29 DIAGNOSIS — R10.13 ABDOMINAL PAIN, ACUTE, EPIGASTRIC: ICD-10-CM

## 2020-04-29 PROBLEM — M06.9 RHEUMATOID ARTHRITIS: Status: ACTIVE | Noted: 2020-04-09

## 2020-04-29 RX ORDER — POLYETHYLENE GLYCOL 3350 17 G/17G
17 POWDER, FOR SOLUTION ORAL
COMMUNITY
End: 2020-09-15

## 2020-04-29 RX ORDER — ESOMEPRAZOLE MAGNESIUM 40 MG/1
40 CAPSULE, DELAYED RELEASE ORAL
Qty: 90 CAPSULE | Refills: 0 | Status: SHIPPED | OUTPATIENT
Start: 2020-04-29 | End: 2020-08-17

## 2020-04-29 RX ORDER — PREDNISONE 5 MG/1
5 TABLET ORAL DAILY
Qty: 30 TABLET | Refills: 0 | Status: CANCELLED | OUTPATIENT
Start: 2020-04-29

## 2020-04-29 RX ORDER — NAPROXEN 500 MG/1
500 TABLET ORAL 2 TIMES DAILY WITH MEALS
Qty: 180 TABLET | Refills: 0 | Status: SHIPPED | OUTPATIENT
Start: 2020-04-29 | End: 2020-06-01

## 2020-04-29 NOTE — TELEPHONE ENCOUNTER
Contacted pt. Appt for procedure scheduled may 05  with .Appt for covid may 01, pt denied symptoms. Phone call f/u may 19 . Went over instructions with pt and pt verbalized understanding.I.  All questions answered.

## 2020-04-29 NOTE — TELEPHONE ENCOUNTER
Called pt to r/s 4/30/20 tele visit due to Dr. IZABELA rivas. Pt r/s for nxt opening 5/13/20. Pt states she is out of Prednisone & needing to have something to help with joint pain as she has stopped the naprosen as instructed to. Please advise

## 2020-04-30 NOTE — PRE-PROCEDURE INSTRUCTIONS
Spoke with  patient     regarding procedure scheduled on 5/5/2020  Arrival time 0630  Has patient been sick with fever or on antibiotics within the last 7 days? no  Has patient received a vaccination within the last 7 days? no  Has the patient stopped all medications as directed? NA  Does patient have a pacemaker and or defibrillator? no  Does the patient have a ride to and from procedure and someone reliable to remain with patient? Yes zoya sommers [phone]  Is the patient diabetic? no  Does the patient have sleep apnea? Or use O2 at home? No no  Is the patient receiving sedation? yes  Is the patient instructed to remain NPO beginning at midnight the night before their procedure? yes  Procedure location confirmed with patient? yes  covid testing scheduled 5/1/2020@1031

## 2020-05-01 ENCOUNTER — LAB VISIT (OUTPATIENT)
Dept: OTOLARYNGOLOGY | Facility: CLINIC | Age: 60
End: 2020-05-01
Payer: MEDICARE

## 2020-05-01 DIAGNOSIS — Z03.818 ENCOUNTER FOR OBSERVATION FOR SUSPECTED EXPOSURE TO OTHER BIOLOGICAL AGENTS RULED OUT: ICD-10-CM

## 2020-05-01 PROCEDURE — U0002 COVID-19 LAB TEST NON-CDC: HCPCS

## 2020-05-02 LAB — SARS-COV-2 RNA RESP QL NAA+PROBE: NOT DETECTED

## 2020-05-04 ENCOUNTER — TELEPHONE (OUTPATIENT)
Dept: PAIN MEDICINE | Facility: CLINIC | Age: 60
End: 2020-05-04

## 2020-05-04 NOTE — TELEPHONE ENCOUNTER
Conatacted pt to tell her what her covid result was as she requested . Pt understood. All questions answered.

## 2020-05-05 ENCOUNTER — HOSPITAL ENCOUNTER (OUTPATIENT)
Facility: HOSPITAL | Age: 60
Discharge: HOME | End: 2020-05-05
Attending: PAIN MEDICINE | Admitting: PAIN MEDICINE
Payer: MEDICARE

## 2020-05-05 VITALS
TEMPERATURE: 98 F | DIASTOLIC BLOOD PRESSURE: 66 MMHG | BODY MASS INDEX: 51.91 KG/M2 | WEIGHT: 293 LBS | OXYGEN SATURATION: 100 % | HEART RATE: 69 BPM | RESPIRATION RATE: 18 BRPM | SYSTOLIC BLOOD PRESSURE: 132 MMHG | HEIGHT: 63 IN

## 2020-05-05 DIAGNOSIS — M47.816 LUMBAR SPONDYLOSIS: Primary | ICD-10-CM

## 2020-05-05 LAB — SARS-COV-2 RDRP RESP QL NAA+PROBE: NEGATIVE

## 2020-05-05 PROCEDURE — 64495 INJ PARAVERT F JNT L/S 3 LEV: CPT | Mod: 50 | Performed by: PAIN MEDICINE

## 2020-05-05 PROCEDURE — 99152 MOD SED SAME PHYS/QHP 5/>YRS: CPT | Performed by: PAIN MEDICINE

## 2020-05-05 PROCEDURE — 64494 INJ PARAVERT F JNT L/S 2 LEV: CPT | Mod: LT,,, | Performed by: PAIN MEDICINE

## 2020-05-05 PROCEDURE — 64493 INJ PARAVERT F JNT L/S 1 LEV: CPT | Mod: 50 | Performed by: PAIN MEDICINE

## 2020-05-05 PROCEDURE — 64494 PR INJ DX/THER AGNT PARAVERT FACET JOINT,IMG GUIDE,LUMBAR/SAC, 2ND LEVEL: ICD-10-PCS | Mod: RT,,, | Performed by: PAIN MEDICINE

## 2020-05-05 PROCEDURE — 25000003 PHARM REV CODE 250: Mod: HCNC | Performed by: PAIN MEDICINE

## 2020-05-05 PROCEDURE — 64493 PR INJ DX/THER AGNT PARAVERT FACET JOINT,IMG GUIDE,LUMBAR/SAC,1ST LVL: ICD-10-PCS | Mod: 50,,, | Performed by: PAIN MEDICINE

## 2020-05-05 PROCEDURE — U0002 COVID-19 LAB TEST NON-CDC: HCPCS

## 2020-05-05 PROCEDURE — 64494 INJ PARAVERT F JNT L/S 2 LEV: CPT | Mod: 50 | Performed by: PAIN MEDICINE

## 2020-05-05 PROCEDURE — 64493 INJ PARAVERT F JNT L/S 1 LEV: CPT | Mod: 50,,, | Performed by: PAIN MEDICINE

## 2020-05-05 PROCEDURE — 63600175 PHARM REV CODE 636 W HCPCS: Performed by: PAIN MEDICINE

## 2020-05-05 RX ORDER — LIDOCAINE HYDROCHLORIDE 20 MG/ML
INJECTION, SOLUTION EPIDURAL; INFILTRATION; INTRACAUDAL; PERINEURAL
Status: DISCONTINUED | OUTPATIENT
Start: 2020-05-05 | End: 2020-05-05 | Stop reason: HOSPADM

## 2020-05-05 RX ORDER — MIDAZOLAM HYDROCHLORIDE 1 MG/ML
INJECTION, SOLUTION INTRAMUSCULAR; INTRAVENOUS
Status: DISCONTINUED | OUTPATIENT
Start: 2020-05-05 | End: 2020-05-05 | Stop reason: HOSPADM

## 2020-05-05 RX ORDER — FENTANYL CITRATE 50 UG/ML
INJECTION, SOLUTION INTRAMUSCULAR; INTRAVENOUS
Status: DISCONTINUED | OUTPATIENT
Start: 2020-05-05 | End: 2020-05-05 | Stop reason: HOSPADM

## 2020-05-05 RX ORDER — METHYLPREDNISOLONE ACETATE 40 MG/ML
INJECTION, SUSPENSION INTRA-ARTICULAR; INTRALESIONAL; INTRAMUSCULAR; SOFT TISSUE
Status: DISCONTINUED | OUTPATIENT
Start: 2020-05-05 | End: 2020-05-05 | Stop reason: HOSPADM

## 2020-05-05 NOTE — OP NOTE
Procedure: Lumbar Medial Branch Block under Fluoroscopic Guidance    Side: bilateral     Level:  Sacral ala (Corresponding to the L5 dorsal ramus), L5 transverse process (Corresponding to the L4 medial branch) and L4 transverse process (Corresponding to the L3 medial branch)     PROCEDURE DATE: 5/5/2020    Pre-operative Diagnosis: Lumbar Spondylosis  Post-operative Diagnosis: Lumbar Spondylosis    Provider: LUPE Salmon MD  Assistant(s): none    Anesthesia: Local, IV Sedation    >> 1 mg of VERSED    >> 25 mcg of FENTANYL     Indication: Low back pain without radiculopathy. Symptoms unresponsive to conservative treatments. Fluoroscopy was used to optimize visualization of needle placement and to maximize safety.     Procedure Description / Technique:  The patient was seen and identified in the preoperative area. Risks, benefits, complications, and alternatives were discussed with the patient. The patient agreed to proceed with the procedure and signed the consent. The site and side of the procedure was identified and marked. An iv was started.     The patient was taken to the procedural suite and positioned in prone orientation on the procedure table. A pillow was placed under the abdomen to reduce lumbar lordosis. A time out was performed. The procedure, site, side, and allergies were stated and agreed to by all present. The lumbosacral area was widely prepped with ChloraPrep. The procedural site was draped in usual sterile fashion. Vital signs were closely monitored throughout this procedure. Conscious sedation was used for this procedure to decrease patient anxiety.    The Left sacral ala and superior articular process was identified and marked on AP fluoroscopic imaging. Subcutaneous tissues were localized using 1% PF Lidocaine to improve patient comfort. A 22 gauge 5 inch spinal needle was advance until the needle rested on OS at the interface of the sacral ala and the base of the sacral superior articular  process. After negative aspiration, 1ml of a 2% lidocaine and 40mg depomedrol solution was injected. No pain or paresthesia was noted on injection. After bilateral injection, the fluoroscope was rotated into the oblique view and the spinal needle was advanced towards the eye of the ayla dog until os was contacted. 1ml of a 2% lidocaine and 40mg depomedrol solution was injected after negative aspiration. No pain or paresthesia was noted. This technique was repeated at each of the above noted levels. The spinal needle was removed intact following injection at each targeted site. The stylet was replaced prior to needle removal at each site.    Description of Findings: Not applicable    Prosthetic devices, grafts, tissues, or devices implanted: None    Specimen Removed: No    ESTIMATED BLOOD LOSS: minimal    COMPLICATIONS: None    DISPOSITION / PLANS: The patient was transferred to the recovery area in a stable condition for observation. The patient was reexamined prior to discharge. There was no evidence of acute neurologic injury following the procedure.  Patient was discharged from the recovery room after meeting discharge criteria. Home discharge instructions were given to the patient by the staff.

## 2020-05-05 NOTE — DISCHARGE INSTRUCTIONS

## 2020-05-06 NOTE — DISCHARGE SUMMARY
[facility]  Short Stay  Discharge Summary    Admit Date: 5/5/2020    Discharge Date and Time: 5/5/2020  8:45 AM      Discharge Attending Physician: LUPE Salmon MD     Hospital Course (synopsis of major diagnoses, care, treatment, and services provided during the course of the hospital stay): Patient was admitted to Pre-op where informed consent was signed.  The patient was then taken to the procedure suite where the procedure was performed.  The patient was then return to the Pre-Op area and discharge was performed.     Final Diagnoses:    Principal Problem: <principal problem not specified>   Secondary Diagnoses:   Active Hospital Problems    Diagnosis  POA    Lumbar spondylosis [M47.816]  Yes      Resolved Hospital Problems   No resolved problems to display.       Discharged Condition: good    Disposition: Home or Self Care    Follow up/Patient Instructions:    Medications:  Reconciled Home Medications:      Medication List      CHANGE how you take these medications    pregabalin 75 MG capsule  Commonly known as:  LYRICA  Take 1 capsule (75 mg total) by mouth 2 (two) times daily.  What changed:  when to take this        CONTINUE taking these medications    baclofen 10 MG tablet  Commonly known as:  LIORESAL  Take 1 tablet (10 mg total) by mouth nightly as needed.     benazepriL 20 MG tablet  Commonly known as:  LOTENSIN  Take 1 tablet (20 mg total) by mouth once daily.     cholestyramine (with sugar) 4 gram Powd  TAKE 4 GRAMS BY MOUTH EVERY DAY     clonazePAM 1 MG tablet  Commonly known as:  KLONOPIN  Take 1 tablet (1 mg total) by mouth 2 (two) times daily.     clotrimazole-betamethasone 1-0.05% cream  Commonly known as:  LOTRISONE  Apply topically 2 (two) times daily as needed.     DULoxetine 60 MG capsule  Commonly known as:  CYMBALTA  Take 1 capsule (60 mg total) by mouth once daily.     esomeprazole 40 MG capsule  Commonly known as:  NEXIUM  Take 1 capsule (40 mg total) by mouth before  breakfast.     fluconazole 200 MG Tab  Commonly known as:  DIFLUCAN  Take 1 tablet (200 mg total) by mouth twice a week.     fluticasone propionate 50 mcg/actuation nasal spray  Commonly known as:  FLONASE  2 sprays (100 mcg total) by Each Nostril route once daily.     hydroCHLOROthiazide 25 MG tablet  Commonly known as:  HYDRODIURIL  Take 1 tablet (25 mg total) by mouth once daily.     hydrocortisone 2.5 % rectal cream  Commonly known as:  PROCTOSOL HC  Place rectally 2 (two) times daily as needed for Hemorrhoids.     metroNIDAZOLE 0.75 % gel  Commonly known as:  METROGEL  AAA of face bid     mupirocin 2 % ointment  Commonly known as:  BACTROBAN  Apply topically 2 (two) times daily.     naproxen 500 MG tablet  Commonly known as:  NAPROSYN  Take 1 tablet (500 mg total) by mouth 2 (two) times daily with meals.     nitrofurantoin (macrocrystal-monohydrate) 100 MG capsule  Commonly known as:  MACROBID  Take 1 capsule (100 mg total) by mouth every evening.     nystatin powder  Commonly known as:  MYCOSTATIN  Apply topically 2 (two) times daily.     nystatin-triamcinolone cream  Commonly known as:  MYCOLOG II  Apply topically 2 (two) times daily.     polyethylene glycol 17 gram Pwpk  Commonly known as:  GLYCOLAX  Take 17 g by mouth.     predniSONE 5 MG tablet  Commonly known as:  DELTASONE  Take 1 tablet (5 mg total) by mouth once daily.     sulfacetamide sodium (acne) 10 % Susp  APPLY 1 APPLICATION TOPICALLY EVERY DAY     tiZANidine 4 MG tablet  Commonly known as:  ZANAFLEX  Take 2 tablets (8 mg total) by mouth once daily.     tolterodine 4 MG 24 hr capsule  Commonly known as:  DETROL LA  Take 1 capsule (4 mg total) by mouth every evening.     traMADoL 50 mg tablet  Commonly known as:  ULTRAM  Take 1 tablet (50 mg total) by mouth every 8 (eight) hours as needed for Pain. Greater than 7 day supply medically necessary.     turmeric root extract 500 mg Cap  Take 500 mg by mouth 2 (two) times daily.        ASK your doctor  about these medications    CREON 24,000-76,000 -120,000 unit capsule  Generic drug:  lipase-protease-amylase 24,000-76,000-120,000 units  Take 1 capsule by mouth once daily.     LINZESS 72 mcg Cap capsule  Generic drug:  linaCLOtide  Take 1 capsule by mouth once daily.     montelukast 10 mg tablet  Commonly known as:  SINGULAIR  Take 10 mg by mouth once daily.     valACYclovir 1000 MG tablet  Commonly known as:  VALTREX  Take 1 tablet (1,000 mg total) by mouth once daily.     vancomycin 500 mg injection  Commonly known as:  VANCOCIN          Discharge Procedure Orders   Diet general     Call MD for:  severe uncontrolled pain     Call MD for:  difficulty breathing, headache or visual disturbances     Call MD for:  redness, tenderness, or signs of infection (pain, swelling, redness, odor or green/yellow discharge around incision site)     Activity as tolerated

## 2020-05-12 ENCOUNTER — PATIENT OUTREACH (OUTPATIENT)
Dept: ADMINISTRATIVE | Facility: OTHER | Age: 60
End: 2020-05-12

## 2020-05-13 ENCOUNTER — TELEPHONE (OUTPATIENT)
Dept: PAIN MEDICINE | Facility: CLINIC | Age: 60
End: 2020-05-13

## 2020-05-13 ENCOUNTER — TELEPHONE (OUTPATIENT)
Dept: PHARMACY | Facility: CLINIC | Age: 60
End: 2020-05-13

## 2020-05-13 ENCOUNTER — OFFICE VISIT (OUTPATIENT)
Dept: RHEUMATOLOGY | Facility: CLINIC | Age: 60
End: 2020-05-13
Payer: COMMERCIAL

## 2020-05-13 DIAGNOSIS — Z79.899 HIGH RISK MEDICATION USE: ICD-10-CM

## 2020-05-13 DIAGNOSIS — M06.09 RHEUMATOID ARTHRITIS, SERONEGATIVE, MULTIPLE SITES: Primary | ICD-10-CM

## 2020-05-13 DIAGNOSIS — M79.7 FIBROMYALGIA: ICD-10-CM

## 2020-05-13 DIAGNOSIS — M47.812 CERVICAL SPONDYLOSIS: ICD-10-CM

## 2020-05-13 DIAGNOSIS — M47.816 LUMBAR SPONDYLOSIS: Primary | ICD-10-CM

## 2020-05-13 PROCEDURE — 99443 PR PHYSICIAN TELEPHONE EVALUATION 21-30 MIN: CPT | Mod: 95,,, | Performed by: STUDENT IN AN ORGANIZED HEALTH CARE EDUCATION/TRAINING PROGRAM

## 2020-05-13 PROCEDURE — 99443 PR PHYSICIAN TELEPHONE EVALUATION 21-30 MIN: ICD-10-PCS | Mod: 95,,, | Performed by: STUDENT IN AN ORGANIZED HEALTH CARE EDUCATION/TRAINING PROGRAM

## 2020-05-13 RX ORDER — PREDNISONE 2.5 MG/1
2.5 TABLET ORAL DAILY
Qty: 30 TABLET | Refills: 0 | Status: SHIPPED | OUTPATIENT
Start: 2020-05-13 | End: 2020-07-29 | Stop reason: SDUPTHER

## 2020-05-13 NOTE — PROGRESS NOTES
Established Patient - Audio Only Telehealth Visit     The patient location is: home  The chief complaint leading to consultation is: joint pain  Visit type: Virtual visit with audio only (telephone)  Total time spent with patient: 25min       The reason for the audio only service rather than synchronous audio and video virtual visit was related to technical difficulties or patient preference/necessity.     Each patient to whom I provide medical services by telemedicine is:  (1) informed of the relationship between the physician and patient and the respective role of any other health care provider with respect to management of the patient; and (2) notified that they may decline to receive medical services by telemedicine and may withdraw from such care at any time. Patient verbally consented to receive this service via voice-only telephone call.            RHEUMATOLOGY OUTPATIENT CLINIC NOTE        Attending Rheumatologist: Ynes Williamson  Primary Care Provider: Kennedy Singh MD   Physician Requesting Consultation: No referring provider defined for this encounter.  Chief Complaint/Reason For Consultation:      Subjective:       SHARMIN Jurado is a 59 y.o. White female with rheumatoid arthritis.  Previous complicated history starting several years ago.  -TKR--> surgery done , then had repetitive falls which ultimately led to dislocated knee-->severed popliteal artery-->compartment syndrome--> right foot drop. Bedridden 23 months .     Previous treatments include:  -Mtx/PLQ  -Enbrel  -Remicade  -Kineret:    Since last visit, pt reports recurrence of joint pains since finishing prednisone trial. +AM stiffness >60min. No relief w otc analgesics. C/o pain and swelling in mcps b/l. States xeljanz had worked well for her in the past, but was discontinued for unclear reasons. Denies hx blood clots.     Pt reports diffuse achiness and generalized pain. Reports significant fatigue. Not sleeping well. Does not feel  "refreshed when wakes up. States that if they over-do it in terms of physical activity, they feel like they are "paying for it the next day." +Significant stress in personal life. Not exercising.     14pt ROS negative except as otherwise stated above    Review of Systems   Constitutional: Negative for chills and fever.   HENT: Negative for congestion and hearing loss.    Eyes: Negative for blurred vision and pain.   Respiratory: Negative for cough and sputum production.    Cardiovascular: Negative for chest pain and leg swelling.   Gastrointestinal: Negative for abdominal pain and heartburn.   Genitourinary: Negative for dysuria and hematuria.   Musculoskeletal: Positive for back pain, falls, joint pain, myalgias and neck pain.   Skin: Negative for itching and rash.   Neurological: Negative for dizziness, sensory change, seizures and weakness.   Endo/Heme/Allergies: Negative for environmental allergies. Does not bruise/bleed easily.   Psychiatric/Behavioral: Negative for depression. The patient is not nervous/anxious and does not have insomnia.        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    Recurrent UTI 10/13/2017    Right bundle branch block     Right foot drop 10/13/2017    Urinary incontinence 10/13/2017    Weakness of right lower extremity 10/13/2017     Past Surgical History:   Procedure Laterality Date    BREAST SURGERY      breast lump removed    CARDIAC CATHETERIZATION Right 03/2018    wrist    CHOLECYSTECTOMY      ENDOSCOPIC ULTRASOUND OF UPPER GASTROINTESTINAL TRACT N/A 4/9/2019    Procedure: ULTRASOUND, UPPER GI TRACT, ENDOSCOPIC;  Surgeon: " Demario Menjivar MD;  Location: [facility];  Service: Endoscopy;  Laterality: N/A;    ENDOSCOPIC ULTRASOUND OF UPPER GASTROINTESTINAL TRACT N/A 5/28/2019    Procedure: ULTRASOUND, UPPER GI TRACT, ENDOSCOPIC;  Surgeon: Justin Munoz MD;  Location: [facility] ENDO;  Service: Endoscopy;  Laterality: N/A;    foot drop surgery      HYSTERECTOMY      INJECTION OF ANESTHETIC AGENT AROUND MEDIAL BRANCH NERVES INNERVATING LUMBAR FACET JOINT Bilateral 8/9/2019    Procedure: Block-nerve-medial branch-lumbar;  Surgeon: Fady Salmon MD;  Location: [facility] MGT;  Service: Pain Management;  Laterality: Bilateral;    INJECTION OF ANESTHETIC AGENT AROUND MEDIAL BRANCH NERVES INNERVATING LUMBAR FACET JOINT Bilateral 11/5/2019    Procedure: Bilateral L3-5 MBB;  Surgeon: Fady Salmon MD;  Location: [facility]GT;  Service: Pain Management;  Laterality: Bilateral;    INJECTION OF ANESTHETIC AGENT AROUND MEDIAL BRANCH NERVES INNERVATING LUMBAR FACET JOINT Bilateral 5/5/2020    Procedure: Bilateral L3-5 MBB with steroid;  Surgeon: Fady Salmon MD;  Location: [facility]GT;  Service: Pain Management;  Laterality: Bilateral;    KNEE SURGERY      13     RECTAL SURGERY      TONSILLECTOMY       Family History   Problem Relation Age of Onset    Colon cancer Mother     Prostate cancer Father     Cancer Sister      Social History     Substance and Sexual Activity   Alcohol Use No     Social History     Tobacco Use   Smoking Status Never Smoker   Smokeless Tobacco Never Used     Social History     Substance and Sexual Activity   Drug Use Never       Current Outpatient Medications:     baclofen (LIORESAL) 10 MG tablet, Take 1 tablet (10 mg total) by mouth nightly as needed., Disp: 30 tablet, Rfl: 3    benazepriL (LOTENSIN) 20 MG tablet, Take 1 tablet (20 mg total) by mouth once daily., Disp: 90 tablet, Rfl: 3    cholestyramine, with sugar, 4 gram Powd, TAKE 4 GRAMS BY MOUTH EVERY DAY, Disp: 378 g, Rfl: 0    clonazePAM  (KLONOPIN) 1 MG tablet, Take 1 tablet (1 mg total) by mouth 2 (two) times daily., Disp: 60 tablet, Rfl: 0    clotrimazole-betamethasone 1-0.05% (LOTRISONE) cream, Apply topically 2 (two) times daily as needed., Disp: 45 g, Rfl: 11    CREON 24,000-76,000 -120,000 unit capsule, Take 1 capsule by mouth once daily., Disp: , Rfl:     DULoxetine (CYMBALTA) 60 MG capsule, Take 1 capsule (60 mg total) by mouth once daily., Disp: 90 capsule, Rfl: 3    esomeprazole (NEXIUM) 40 MG capsule, Take 1 capsule (40 mg total) by mouth before breakfast., Disp: 90 capsule, Rfl: 0    fluconazole (DIFLUCAN) 200 MG Tab, Take 1 tablet (200 mg total) by mouth twice a week., Disp: 8 tablet, Rfl: 3    fluticasone propionate (FLONASE) 50 mcg/actuation nasal spray, 2 sprays (100 mcg total) by Each Nostril route once daily., Disp: 48 g, Rfl: 3    hydroCHLOROthiazide (HYDRODIURIL) 25 MG tablet, Take 1 tablet (25 mg total) by mouth once daily., Disp: 30 tablet, Rfl: 11    hydrocortisone (PROCTOSOL HC) 2.5 % rectal cream, Place rectally 2 (two) times daily as needed for Hemorrhoids., Disp: 28 g, Rfl: 11    LINZESS 72 mcg Cap capsule, Take 1 capsule by mouth once daily., Disp: , Rfl:     metroNIDAZOLE (METROGEL) 0.75 % gel, AAA of face bid, Disp: 45 g, Rfl: 3    montelukast (SINGULAIR) 10 mg tablet, Take 10 mg by mouth once daily., Disp: , Rfl:     mupirocin (BACTROBAN) 2 % ointment, Apply topically 2 (two) times daily., Disp: 30 g, Rfl: 0    naproxen (NAPROSYN) 500 MG tablet, Take 1 tablet (500 mg total) by mouth 2 (two) times daily with meals., Disp: 180 tablet, Rfl: 0    nitrofurantoin, macrocrystal-monohydrate, (MACROBID) 100 MG capsule, Take 1 capsule (100 mg total) by mouth every evening., Disp: 90 capsule, Rfl: 3    nystatin (MYCOSTATIN) powder, Apply topically 2 (two) times daily., Disp: 60 g, Rfl: 11    nystatin-triamcinolone (MYCOLOG II) cream, Apply topically 2 (two) times daily., Disp: 60 g, Rfl: 3    polyethylene glycol  (GLYCOLAX) 17 gram PwPk, Take 17 g by mouth., Disp: , Rfl:     predniSONE (DELTASONE) 2.5 MG tablet, Take 1 tablet (2.5 mg total) by mouth once daily., Disp: 30 tablet, Rfl: 0    predniSONE (DELTASONE) 5 MG tablet, Take 1 tablet (5 mg total) by mouth once daily., Disp: 30 tablet, Rfl: 0    pregabalin (LYRICA) 75 MG capsule, Take 1 capsule (75 mg total) by mouth 2 (two) times daily. (Patient taking differently: Take 75 mg by mouth once daily. ), Disp: 180 capsule, Rfl: 3    sulfacetamide sodium, acne, 10 % Susp, APPLY 1 APPLICATION TOPICALLY EVERY DAY, Disp: 118 mL, Rfl: 3    tiZANidine (ZANAFLEX) 4 MG tablet, Take 2 tablets (8 mg total) by mouth once daily., Disp: 180 tablet, Rfl: 0    tolterodine (DETROL LA) 4 MG 24 hr capsule, Take 1 capsule (4 mg total) by mouth every evening., Disp: 90 capsule, Rfl: 3    traMADoL (ULTRAM) 50 mg tablet, Take 1 tablet (50 mg total) by mouth every 8 (eight) hours as needed for Pain. Greater than 7 day supply medically necessary., Disp: 90 tablet, Rfl: 1    turmeric root extract 500 mg Cap, Take 500 mg by mouth 2 (two) times daily., Disp: 60 capsule, Rfl: 3    upadacitinib (RINVOQ) 15 mg ER 24 hr tablet, Take 1 tablet (15 mg total) by mouth once daily., Disp: 30 tablet, Rfl: 3    valACYclovir (VALTREX) 1000 MG tablet, Take 1 tablet (1,000 mg total) by mouth once daily., Disp: 90 tablet, Rfl: 3    vancomycin (VANCOCIN) 500 mg injection, , Disp: , Rfl:        Objective:         There were no vitals filed for this visit.        Reviewed old and all outside pertinent medical records available.    All lab results personally reviewed and interpreted by me.  Lab Results   Component Value Date    WBC 8.43 03/05/2020    HGB 11.6 (L) 03/05/2020    HCT 37.7 03/05/2020    MCV 91 03/05/2020    MCH 28.0 03/05/2020    MCHC 30.8 (L) 03/05/2020    RDW 13.3 03/05/2020     (H) 03/05/2020    MPV 9.9 03/05/2020       Lab Results   Component Value Date     03/05/2020    K 4.5  03/05/2020    CL 98 03/05/2020    CO2 26 03/05/2020     03/05/2020    BUN 20 03/05/2020    CALCIUM 9.6 03/05/2020    PROT 8.0 03/05/2020    ALBUMIN 3.5 03/05/2020    BILITOT 0.5 03/05/2020    AST 13 03/05/2020    ALKPHOS 108 03/05/2020    ALT 10 03/05/2020       Lab Results   Component Value Date    COLORU Yellow 10/10/2017    APPEARANCEUA Clear 10/10/2017    SPECGRAV <=1.005 (A) 10/10/2017    PHUR 6.0 10/10/2017    PROTEINUA Negative 10/10/2017    KETONESU Negative 10/10/2017    LEUKOCYTESUR 1+ (A) 10/10/2017    NITRITE Negative 10/10/2017       Lab Results   Component Value Date    CRP 58.7 (H) 03/05/2020       Lab Results   Component Value Date    SEDRATE 24 03/05/2020       Lab Results   Component Value Date    RF <10.0 03/05/2020    SEDRATE 24 03/05/2020       No components found for: 25OHVITDTOT, 86MWOZGG4, 73YCWJZZ2, METHODNOTE    No results found for: URICACID    No components found for: TSPOTTB      Imaging:     ASSESSMENT / PLAN:     Rivka Jurado is a 59 y.o. White female with:      1. Rheumatoid arthritis, seronegative, multiple sites  -States she had previously been treated w numerous meds as noted above and had been in remission. Will get all previous records. Reports significant improvement in hand pain/stiffnes w prednisone trial, but now w recurrence symptoms.   -Discussed risks/benefits of all meds including increased risk GI perforation, infections, and dvt.   -Restart prednisone w/ taper as discussed at reduced dose  -Will start PA for rinvoq. Needs shingrix vaccine      2. Fibromyalgia  -+Diffuse achiness/pain, +fatigue, +significant stress in personal life  -Discussed importance of proper sleep hygiene and exercise. Recommend low impact aerobics.    #High risk med use  -No sign or symptoms suggestive of infection today. Hold if infection.      Method of contact with patient concerns: MyChart attn Rheumatology      Ynes Williamson M.D.  Rheumatology Department   [facility] -  [address] [address]  Phone: [phone]  Fax: [phone]                   This service was not originating from a related E/M service provided within the previous 7 days nor will  to an E/M service or procedure within the next 24 hours or my soonest available appointment.  Prevailing standard of care was able to be met in this audio-only visit.

## 2020-05-13 NOTE — TELEPHONE ENCOUNTER
----- Message from Cindy Thurston sent at 5/13/2020 11:51 AM CDT -----  Contact: pt   Would like to consult with nurse regarding a question she has,  wants her to have a shingles shot. Wants to know what will be a good time to have get it due to her just having a injection. Please give a call back at [phone].            Thanks,  Cindy REEDER

## 2020-05-13 NOTE — TELEPHONE ENCOUNTER
Informed Patient  that [facility] received prescription for Rinvoq and benefits investigation is required.  [facility] will be back in touch once insurance determination is received.

## 2020-05-14 ENCOUNTER — TELEPHONE (OUTPATIENT)
Dept: PAIN MEDICINE | Facility: CLINIC | Age: 60
End: 2020-05-14

## 2020-05-14 NOTE — TELEPHONE ENCOUNTER
Made pt aware that her insurance may not approve the Neck MRI as the provider has not discussed any issues with neck at this time.  Provided the Pt with the number to the [place] location as she has a very busy scheduled and prefer to call and scheduled on her own//dp

## 2020-05-14 NOTE — TELEPHONE ENCOUNTER
----- Message from Fady Salmon MD sent at 5/13/2020  5:14 PM CDT -----  Ok, please let he know there is a good chance that insurance will deny this as we have not addressed her neck in past.  ----- Message -----  From: Effie Alberto MA  Sent: 5/13/2020   8:51 AM CDT  To: Fady Salmon MD    Please put in order for MRI of Neck and Back     Thanks  Effie

## 2020-05-14 NOTE — TELEPHONE ENCOUNTER
Pt s/p bilat L3-5 MBB 05/05/2020 per .     Contacted pt. Pt reports 81% improvement day of procedure. Please advise.dp

## 2020-05-18 ENCOUNTER — PATIENT OUTREACH (OUTPATIENT)
Dept: ADMINISTRATIVE | Facility: OTHER | Age: 60
End: 2020-05-18

## 2020-05-18 DIAGNOSIS — M06.09 RHEUMATOID ARTHRITIS, SERONEGATIVE, MULTIPLE SITES: Primary | ICD-10-CM

## 2020-05-18 DIAGNOSIS — Z79.899 HIGH RISK MEDICATION USE: ICD-10-CM

## 2020-05-18 DIAGNOSIS — M79.7 FIBROMYALGIA: ICD-10-CM

## 2020-05-19 ENCOUNTER — IMMUNIZATION (OUTPATIENT)
Dept: PHARMACY | Facility: CLINIC | Age: 60
End: 2020-05-19
Payer: COMMERCIAL

## 2020-05-19 ENCOUNTER — TELEPHONE (OUTPATIENT)
Dept: INTERNAL MEDICINE | Facility: CLINIC | Age: 60
End: 2020-05-19

## 2020-05-19 ENCOUNTER — LAB VISIT (OUTPATIENT)
Dept: LAB | Facility: HOSPITAL | Age: 60
End: 2020-05-19
Attending: STUDENT IN AN ORGANIZED HEALTH CARE EDUCATION/TRAINING PROGRAM
Payer: COMMERCIAL

## 2020-05-19 ENCOUNTER — OFFICE VISIT (OUTPATIENT)
Dept: PAIN MEDICINE | Facility: CLINIC | Age: 60
End: 2020-05-19
Payer: COMMERCIAL

## 2020-05-19 DIAGNOSIS — Z79.899 HIGH RISK MEDICATION USE: ICD-10-CM

## 2020-05-19 DIAGNOSIS — M47.816 LUMBAR SPONDYLOSIS: Primary | ICD-10-CM

## 2020-05-19 DIAGNOSIS — M79.7 FIBROMYALGIA: ICD-10-CM

## 2020-05-19 DIAGNOSIS — M06.09 RHEUMATOID ARTHRITIS, SERONEGATIVE, MULTIPLE SITES: ICD-10-CM

## 2020-05-19 PROCEDURE — 86480 TB TEST CELL IMMUN MEASURE: CPT

## 2020-05-19 PROCEDURE — 99441 PR PHYSICIAN TELEPHONE EVALUATION 5-10 MIN: CPT | Mod: 95,,, | Performed by: PAIN MEDICINE

## 2020-05-19 PROCEDURE — 36415 COLL VENOUS BLD VENIPUNCTURE: CPT

## 2020-05-19 PROCEDURE — 99441 PR PHYSICIAN TELEPHONE EVALUATION 5-10 MIN: ICD-10-PCS | Mod: 95,,, | Performed by: PAIN MEDICINE

## 2020-05-19 NOTE — PATIENT INSTRUCTIONS
-will schedule for bilateral lumbar medial branch blocks in September of 2020  -continue all medications as prescribed  -follow up in clinic as needed

## 2020-05-19 NOTE — PROGRESS NOTES
Established Patient - Audio Only Telehealth Visit     The patient location is:  At home  The chief complaint leading to consultation is:  Low back pain  Visit type: Virtual visit with audio only (telephone)  Total time spent with patient:  6 min 2 sec       The reason for the audio only service rather than synchronous audio and video virtual visit was related to technical difficulties or patient preference/necessity.     Each patient to whom I provide medical services by telemedicine is:  (1) informed of the relationship between the physician and patient and the respective role of any other health care provider with respect to management of the patient; and (2) notified that they may decline to receive medical services by telemedicine and may withdraw from such care at any time. Patient verbally consented to receive this service via voice-only telephone call.       HPI: Ms. Jurado underwent bilateral lumbar medial branch blocks with steroid approximately 2 weeks ago.  She reports since then her symptoms have almost completely resolved in her low back feels much better.  It took approximately 1 week for the steroid to kick in a provide this benefit for her.  She denies having any symptoms related to the corona virus including fever, cough, shortness of breath.  She would like to go ahead and schedule a repeat injection in approximately September of 2020 which will be approximately 1-2 weeks before her wedding.    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/2017    [facility]reat[facility]/18/2019    [facility]    Recurrent UTI 10/13/2017    Right bundle branch block     Right foot drop 10/13/2017    Urinary incontinence 10/13/2017    Weakness of right lower extremity 10/13/2017       Past Surgical History:   Procedure Laterality Date    BREAST SURGERY      breast lump removed    CARDIAC CATHETERIZATION Right 03/2018    wrist    CHOLECYSTECTOMY      ENDOSCOPIC ULTRASOUND OF UPPER GASTROINTESTINAL TRACT N/A 4/9/2019    Procedure: ULTRASOUND, UPPER GI TRACT, ENDOSCOPIC;  Surgeon: Demario Menjivar MD;  Location: [facility] ENDO;  Service: Endoscopy;  Laterality: N/A;    ENDOSCOPIC ULTRASOUND OF UPPER GASTROINTESTINAL TRACT N/A 5/28/2019    Procedure: ULTRASOUND, UPPER GI TRACT, ENDOSCOPIC;  Surgeon: Justin Munoz MD;  Location: [facility] ENDO;  Service: Endoscopy;  Laterality: N/A;    foot drop surgery      HYSTERECTOMY      INJECTION OF ANESTHETIC AGENT AROUND MEDIAL BRANCH NERVES INNERVATING LUMBAR FACET JOINT Bilateral 8/9/2019    Procedure: Block-nerve-medial branch-lumbar;  Surgeon: Fady Salmon MD;  Location: [facility] PAIN MGT;  Service: Pain Management;  Laterality: Bilateral;    INJECTION OF ANESTHETIC AGENT AROUND MEDIAL BRANCH NERVES INNERVATING LUMBAR FACET JOINT Bilateral 11/5/2019    Procedure: Bilateral L3-5 MBB;  Surgeon: Fady Salmon MD;  Location: [facility] PAIN MGT;  Service: Pain Management;  Laterality: Bilateral;    INJECTION OF ANESTHETIC AGENT AROUND MEDIAL BRANCH NERVES INNERVATING LUMBAR FACET JOINT Bilateral 5/5/2020    Procedure: Bilateral L3-5 MBB with steroid;  Surgeon: Fady Salmon MD;  Location: [facility] PAIN MGT;  Service: Pain Management;  Laterality: Bilateral;    KNEE SURGERY      13     RECTAL SURGERY      TONSILLECTOMY        Social History     Socioeconomic History    Marital status:      Spouse name: Not on file    Number of children: Not on file    Years of education: Not on file    Highest education level: Not on file   Occupational History    Not on file   Social Needs    Financial resource strain: Not on file    Food insecurity:     Worry: Not on file     Inability: Not on file    Transportation needs:     Medical: Not on file      Non-medical: Not on file   Tobacco Use    Smoking status: Never Smoker    Smokeless tobacco: Never Used   Substance and Sexual Activity    Alcohol use: No    Drug use: Never    Sexual activity: Not Currently     Partners: Male   Lifestyle    Physical activity:     Days per week: Not on file     Minutes per session: Not on file    Stress: Not on file   Relationships    Social connections:     Talks on phone: Not on file     Gets together: Not on file     Attends Restorationist service: Not on file     Active member of club or organization: Not on file     Attends meetings of clubs or organizations: Not on file     Relationship status: Not on file   Other Topics Concern    Not on file   Social History Narrative    Not on file      Family History   Problem Relation Age of Onset    Colon cancer Mother     Prostate cancer Father     Cancer Sister       Review of patient's allergies indicates:   Allergen Reactions    Clindamycin Diarrhea and Nausea And Vomiting    Lisinopril Other (See Comments)     Low BP    Morphine Nausea And Vomiting    Stadol [butorphanol tartrate] Hallucinations      Current Outpatient Medications   Medication Sig    baclofen (LIORESAL) 10 MG tablet Take 1 tablet (10 mg total) by mouth nightly as needed.    benazepriL (LOTENSIN) 20 MG tablet Take 1 tablet (20 mg total) by mouth once daily.    cholestyramine, with sugar, 4 gram Powd TAKE 4 GRAMS BY MOUTH EVERY DAY    clonazePAM (KLONOPIN) 1 MG tablet Take 1 tablet (1 mg total) by mouth 2 (two) times daily.    clotrimazole-betamethasone 1-0.05% (LOTRISONE) cream Apply topically 2 (two) times daily as needed.    CREON 24,000-76,000 -120,000 unit capsule Take 1 capsule by mouth once daily.    DULoxetine (CYMBALTA) 60 MG capsule Take 1 capsule (60 mg total) by mouth once daily.    esomeprazole (NEXIUM) 40 MG capsule Take 1 capsule (40 mg total) by mouth before breakfast.    fluconazole (DIFLUCAN) 200 MG Tab Take 1 tablet (200 mg  total) by mouth twice a week.    fluticasone propionate (FLONASE) 50 mcg/actuation nasal spray 2 sprays (100 mcg total) by Each Nostril route once daily.    hydroCHLOROthiazide (HYDRODIURIL) 25 MG tablet Take 1 tablet (25 mg total) by mouth once daily.    hydrocortisone (PROCTOSOL HC) 2.5 % rectal cream Place rectally 2 (two) times daily as needed for Hemorrhoids.    LINZESS 72 mcg Cap capsule Take 1 capsule by mouth once daily.    metroNIDAZOLE (METROGEL) 0.75 % gel AAA of face bid    montelukast (SINGULAIR) 10 mg tablet Take 10 mg by mouth once daily.    mupirocin (BACTROBAN) 2 % ointment Apply topically 2 (two) times daily.    naproxen (NAPROSYN) 500 MG tablet Take 1 tablet (500 mg total) by mouth 2 (two) times daily with meals.    nitrofurantoin, macrocrystal-monohydrate, (MACROBID) 100 MG capsule Take 1 capsule (100 mg total) by mouth every evening.    nystatin (MYCOSTATIN) powder Apply topically 2 (two) times daily.    nystatin-triamcinolone (MYCOLOG II) cream Apply topically 2 (two) times daily.    polyethylene glycol (GLYCOLAX) 17 gram PwPk Take 17 g by mouth.    predniSONE (DELTASONE) 2.5 MG tablet Take 1 tablet (2.5 mg total) by mouth once daily.    predniSONE (DELTASONE) 5 MG tablet Take 1 tablet (5 mg total) by mouth once daily.    pregabalin (LYRICA) 75 MG capsule Take 1 capsule (75 mg total) by mouth 2 (two) times daily. (Patient taking differently: Take 75 mg by mouth once daily. )    sulfacetamide sodium, acne, 10 % Susp APPLY 1 APPLICATION TOPICALLY EVERY DAY    tiZANidine (ZANAFLEX) 4 MG tablet Take 2 tablets (8 mg total) by mouth once daily.    tolterodine (DETROL LA) 4 MG 24 hr capsule Take 1 capsule (4 mg total) by mouth every evening.    traMADoL (ULTRAM) 50 mg tablet Take 1 tablet (50 mg total) by mouth every 8 (eight) hours as needed for Pain. Greater than 7 day supply medically necessary.    turmeric root extract 500 mg Cap Take 500 mg by mouth 2 (two) times daily.     upadacitinib (RINVOQ) 15 mg ER 24 hr tablet Take 1 tablet (15 mg total) by mouth once daily.    valACYclovir (VALTREX) 1000 MG tablet Take 1 tablet (1,000 mg total) by mouth once daily.    vancomycin (VANCOCIN) 500 mg injection     varicella-zoster [id number], PF, (SHINGRIX, PF,) 50 mcg/0.5 mL injection Use as directed     No current facility-administered medications for this visit.       REVIEW OF SYSTEMS:   GENERAL: No weight loss, malaise or fevers.   HEENT: No recent changes in vision or hearing   NECK: Negative for lumps, no difficulty with swallowing.   RESPIRATORY: Negative for cough, wheezing or shortness of breath, patient denies any recent URI.   CARDIOVASCULAR: Negative for chest pain, leg swelling or palpitations.   GI: Negative for abdominal discomfort, blood in stools or black stools or change in bowel habits.   MUSCULOSKELETAL: See HPI.   SKIN: Negative for lesions, rash, and itching.   PSYCH: No mood disorder or recent psychosocial stressors. Patients sleep is not disturbed secondary to pain.   HEMATOLOGY/LYMPHOLOGY: Negative for prolonged bleeding, bruising easily or swollen nodes. Patient is not currently taking any anti-coagulants   NEURO: No history of headaches, syncope, paralysis, seizures or tremors.   All other reviewed and negative other than HPI.   OBJECTIVE:   Respiratory:  Nonlabored breathing  Psych:  Normal affect    ASSESSMENT: 59 y.o. year old female with low back pain, consistent with   Lumbar spondylosis  PLAN:   -continue all medications as prescribed  -will order lumbar medial branch blocks for September 2020  -follow up in clinic for imaging review  The above plan and management options were discussed at length with patient. Patient is in agreement with the above and verbalized understanding. It will be communicated with the referring physician via electronic record, fax, or mail.   Fady Salmon   05/19/2020           This service was not originating from a related E/M service  provided within the previous 7 days nor will  to an E/M service or procedure within the next 24 hours or my soonest available appointment.  Prevailing standard of care was able to be met in this audio-only visit.

## 2020-05-19 NOTE — TELEPHONE ENCOUNTER
----- Message from Mishel Chan sent at 5/18/2020  4:52 PM CDT -----  Contact: pt   Stated calling about some labs test that needs to be redone, she can be reached at [phone] Thanks

## 2020-05-19 NOTE — TELEPHONE ENCOUNTER
Patient phoned and stated that she was supposed to repeat labs in a month but Covid-19 hit. She is coming today to get labs drawn at 2:10 pm and would like to repeat those labs: C-reactive protein, lipid panel, CMP, and CBC.//KM

## 2020-05-20 ENCOUNTER — TELEPHONE (OUTPATIENT)
Dept: RHEUMATOLOGY | Facility: CLINIC | Age: 60
End: 2020-05-20

## 2020-05-20 NOTE — TELEPHONE ENCOUNTER
DOCUMENTATION ONLY:  Prior authorization for Rinvoq approved from 5/20/2020 to 12/31/2020.  Case ID# [id number]     Co-pay: $3.90    Patient Assistance IS NOT required.     Forward to clinical pharmacist for consult & shipment.

## 2020-05-20 NOTE — TELEPHONE ENCOUNTER
----- Message from Sukhwinder Dejesus sent at 5/20/2020 10:11 AM CDT -----  Contact: pt [phone]  Would like to consult with nurse regarding the shingles vaccine and being able to start sammie meds  Please call pt [phone].  Thanks/As

## 2020-05-20 NOTE — TELEPHONE ENCOUNTER
Spoke with Ayanna Popeye she reports that she had her Shingles vaccine on yesterday and will be taking the second one in 2 months

## 2020-05-21 ENCOUNTER — TELEPHONE (OUTPATIENT)
Dept: INTERNAL MEDICINE | Facility: CLINIC | Age: 60
End: 2020-05-21

## 2020-05-21 LAB
GAMMA INTERFERON BACKGROUND BLD IA-ACNC: 0.02 IU/ML
M TB IFN-G CD4+ BCKGRND COR BLD-ACNC: 0 IU/ML
MITOGEN IGNF BCKGRD COR BLD-ACNC: >10 IU/ML
TB GOLD PLUS: NEGATIVE
TB2 - NIL: 0 IU/ML

## 2020-05-21 NOTE — TELEPHONE ENCOUNTER
Spoke with patient in regards to phone message, ask if her labs can be mailed out today, nurse verbalized yes she will mailed them out today.//KM

## 2020-05-21 NOTE — TELEPHONE ENCOUNTER
----- Message from Lindsey Mandujano sent at 5/21/2020 11:29 AM CDT -----  Contact: pt  Pt would like a call back in regards to results and pt is requesting the actual numbers and can be reached at [phone]        Thanks,  Lindsey Mandujano

## 2020-05-26 ENCOUNTER — TELEPHONE (OUTPATIENT)
Dept: RHEUMATOLOGY | Facility: CLINIC | Age: 60
End: 2020-05-26

## 2020-05-26 NOTE — TELEPHONE ENCOUNTER
Spoke with pt and she got a call from [employer] stating that her Rinvoq was approved. States [facility] saw she received the shingles vaccine on 5.19.20 and asked if she was told by Dr. Cruz to wait two weeks after getting the shingles vaccine to start taking the Rinvoq. Pt also states she is seeing [facility] with [facility] because shw may have some kind of kidney failure. States she did labs for them on Friday, 5.20.20 and it showed hyperparathyroid and she is still waiting for the results of the 24 hour urine. Wants to know if Dr. Williamson needs to check with Dr. Gonzalez if she can take the Rinvoq due to kidney function. Pt would like to know before getting medication./    1. Does she need to wait 2 weeks after shingles vaccine before starting Rinvoq?  2. Do we need to check with Dr. Gonzalez at [facility] before starting Rinvoq?

## 2020-05-26 NOTE — TELEPHONE ENCOUNTER
----- Message from Tiffany Maldonado sent at 5/26/2020  2:08 PM CDT -----  ..Type:  Patient Returning Call    Who Called:pt   Who Left Message for Patient:  Does the patient know what this is regarding?: medication   Would the patient rather a call back or a response via [facility]? Call back   Best Call Back Number: [phone]  Additional Information: Pt is requesting a call from nurse to discuss when should she start a medication after shingles shot. Please f/u with Dr [facility] ( [facility]

## 2020-05-27 ENCOUNTER — TELEPHONE (OUTPATIENT)
Dept: RADIOLOGY | Facility: HOSPITAL | Age: 60
End: 2020-05-27

## 2020-05-27 ENCOUNTER — PATIENT OUTREACH (OUTPATIENT)
Dept: ADMINISTRATIVE | Facility: HOSPITAL | Age: 60
End: 2020-05-27

## 2020-05-27 NOTE — PROGRESS NOTES
Patient is on the [employer] non complaint report due to RA and not being on a DMARD. Patienti seeing Rheum and is on Rinvoq.

## 2020-05-28 ENCOUNTER — HOSPITAL ENCOUNTER (OUTPATIENT)
Dept: RADIOLOGY | Facility: HOSPITAL | Age: 60
Discharge: HOME | End: 2020-05-28
Attending: PAIN MEDICINE
Payer: COMMERCIAL

## 2020-05-28 DIAGNOSIS — M47.812 CERVICAL SPONDYLOSIS: ICD-10-CM

## 2020-05-28 DIAGNOSIS — M47.816 LUMBAR SPONDYLOSIS: ICD-10-CM

## 2020-05-28 PROCEDURE — 72141 MRI NECK SPINE W/O DYE: CPT | Mod: TC

## 2020-05-28 PROCEDURE — 72148 MRI LUMBAR SPINE WITHOUT CONTRAST: ICD-10-PCS | Mod: 26,,, | Performed by: RADIOLOGY

## 2020-05-28 PROCEDURE — 72141 MRI NECK SPINE W/O DYE: CPT | Mod: 26,,, | Performed by: RADIOLOGY

## 2020-05-28 PROCEDURE — 72141 MRI CERVICAL SPINE WITHOUT CONTRAST: ICD-10-PCS | Mod: 26,,, | Performed by: RADIOLOGY

## 2020-05-28 PROCEDURE — 72148 MRI LUMBAR SPINE W/O DYE: CPT | Mod: TC

## 2020-05-28 PROCEDURE — 72148 MRI LUMBAR SPINE W/O DYE: CPT | Mod: 26,,, | Performed by: RADIOLOGY

## 2020-05-29 ENCOUNTER — TELEPHONE (OUTPATIENT)
Dept: RHEUMATOLOGY | Facility: CLINIC | Age: 60
End: 2020-05-29

## 2020-05-29 ENCOUNTER — TELEPHONE (OUTPATIENT)
Dept: PAIN MEDICINE | Facility: CLINIC | Age: 60
End: 2020-05-29

## 2020-05-29 ENCOUNTER — PATIENT OUTREACH (OUTPATIENT)
Dept: ADMINISTRATIVE | Facility: OTHER | Age: 60
End: 2020-05-29

## 2020-05-29 ENCOUNTER — OFFICE VISIT (OUTPATIENT)
Dept: PAIN MEDICINE | Facility: CLINIC | Age: 60
End: 2020-05-29
Payer: COMMERCIAL

## 2020-05-29 DIAGNOSIS — M47.812 CERVICAL SPONDYLOSIS: Primary | ICD-10-CM

## 2020-05-29 DIAGNOSIS — M54.12 CERVICAL RADICULOPATHY: ICD-10-CM

## 2020-05-29 PROCEDURE — 99442 PR PHYSICIAN TELEPHONE EVALUATION 11-20 MIN: CPT | Mod: 95,,, | Performed by: PAIN MEDICINE

## 2020-05-29 PROCEDURE — 99442 PR PHYSICIAN TELEPHONE EVALUATION 11-20 MIN: ICD-10-PCS | Mod: 95,,, | Performed by: PAIN MEDICINE

## 2020-05-29 NOTE — PROGRESS NOTES
Patient's chart was reviewed.   Requested updates within Care Everywhere.  Immunizations reconciled.    Health Maintenance was updated.  Mammogram scheduled 6/1/2020

## 2020-05-29 NOTE — PATIENT INSTRUCTIONS
-will schedule for C7/T1 interlaminar epidural steroid injection  -continue all medications as prescribed  -follow up in clinic 4 weeks after the injection

## 2020-05-29 NOTE — PROGRESS NOTES
Established Patient - Audio Only Telehealth Visit     The patient location is:  At home  The chief complaint leading to consultation is:  Neck and back pain  Visit type: Virtual visit with audio only (telephone)  Total time spent with patient:  18 min 2 sec       The reason for the audio only service rather than synchronous audio and video virtual visit was related to technical difficulties or patient preference/necessity.     Each patient to whom I provide medical services by telemedicine is:  (1) informed of the relationship between the physician and patient and the respective role of any other health care provider with respect to management of the patient; and (2) notified that they may decline to receive medical services by telemedicine and may withdraw from such care at any time. Patient verbally consented to receive this service via voice-only telephone call.      SUBJECTIVE:   Rivka Jurado presents tele-medicine appointment for a follow-up appointment for neck and low back pain.  She has recently undergone bilateral lumbar medial branch blocks with steroid which has provided significant pain relief for the lumbar spine.  She does find that when she 1st wakes up in the morning or like yesterday when she had a lay on the MRI table for an hour that she is stiff when she 1st tries to get up however with activity her symptoms do improve in her low back feels much better.  She recent underwent cervical and lumbar MRI for updates and evaluations and she would like to have the results during this phone call.  She continues to have numbness and tingling in her bilateral upper extremities.  She denies having numbness and weakness in her legs bilaterally.  She has been taking baclofen, Cymbalta, naproxen and Lyrica which all do provide some symptomatic pain relief.  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/2017    [facility]/18/2019    [facility]    Recurrent UTI 10/13/2017    Right bundle branch block     Right foot drop 10/13/2017    Urinary incontinence 10/13/2017    Weakness of right lower extremity 10/13/2017      Past Surgical History:   Procedure Laterality Date    BREAST SURGERY      breast lump removed    CARDIAC CATHETERIZATION Right 03/2018    wrist    CHOLECYSTECTOMY      ENDOSCOPIC ULTRASOUND OF UPPER GASTROINTESTINAL TRACT N/A 4/9/2019    Procedure: ULTRASOUND, UPPER GI TRACT, ENDOSCOPIC;  Surgeon: Demario Menjivar MD;  Location: [facility];  Service: Endoscopy;  Laterality: N/A;    ENDOSCOPIC ULTRASOUND OF UPPER GASTROINTESTINAL TRACT N/A 5/28/2019    Procedure: ULTRASOUND, UPPER GI TRACT, ENDOSCOPIC;  Surgeon: Justin Munoz MD;  Location: [facility];  Service: Endoscopy;  Laterality: N/A;    foot drop surgery      HYSTERECTOMY      INJECTION OF ANESTHETIC AGENT AROUND MEDIAL BRANCH NERVES INNERVATING LUMBAR FACET JOINT Bilateral 8/9/2019    Procedure: Block-nerve-medial branch-lumbar;  Surgeon: Fady Salmon MD;  Location: [facility];  Service: Pain Management;  Laterality: Bilateral;    INJECTION OF ANESTHETIC AGENT AROUND MEDIAL BRANCH NERVES INNERVATING LUMBAR FACET JOINT Bilateral 11/5/2019    Procedure: Bilateral L3-5 MBB;  Surgeon: Fady Salmon MD;  Location: [facility];  Service: Pain Management;  Laterality: Bilateral;    INJECTION OF ANESTHETIC AGENT AROUND MEDIAL BRANCH NERVES INNERVATING LUMBAR FACET  JOINT Bilateral 5/5/2020    Procedure: Bilateral L3-5 MBB with steroid;  Surgeon: Fady Salmon MD;  Location: [facility];  Service: Pain Management;  Laterality: Bilateral;    KNEE SURGERY      13     RECTAL SURGERY      TONSILLECTOMY        Social History     Socioeconomic History    Marital status:      Spouse name: Not on file    Number of children: Not on file    Years of education: Not on file    Highest education level: Not on file   Occupational History    Not on file   Social Needs    Financial resource strain: Not on file    Food insecurity:     Worry: Not on file     Inability: Not on file    Transportation needs:     Medical: Not on file     Non-medical: Not on file   Tobacco Use    Smoking status: Never Smoker    Smokeless tobacco: Never Used   Substance and Sexual Activity    Alcohol use: No    Drug use: Never    Sexual activity: Not Currently     Partners: Male   Lifestyle    Physical activity:     Days per week: Not on file     Minutes per session: Not on file    Stress: Not on file   Relationships    Social connections:     Talks on phone: Not on file     Gets together: Not on file     Attends Bahai service: Not on file     Active member of club or organization: Not on file     Attends meetings of clubs or organizations: Not on file     Relationship status: Not on file   Other Topics Concern    Not on file   Social History Narrative    Not on file      Family History   Problem Relation Age of Onset    Colon cancer Mother     Prostate cancer Father     Cancer Sister       Review of patient's allergies indicates:   Allergen Reactions    Clindamycin Diarrhea and Nausea And Vomiting    Lisinopril Other (See Comments)     Low BP    Morphine Nausea And Vomiting    Stadol [butorphanol tartrate] Hallucinations      Current Outpatient Medications   Medication Sig    baclofen (LIORESAL) 10 MG tablet Take 1 tablet (10 mg total) by mouth nightly as needed.     benazepriL (LOTENSIN) 20 MG tablet Take 1 tablet (20 mg total) by mouth once daily.    cholestyramine, with sugar, 4 gram Powd TAKE 4 GRAMS BY MOUTH EVERY DAY    clonazePAM (KLONOPIN) 1 MG tablet Take 1 tablet (1 mg total) by mouth 2 (two) times daily.    clotrimazole-betamethasone 1-0.05% (LOTRISONE) cream Apply topically 2 (two) times daily as needed.    CREON 24,000-76,000 -120,000 unit capsule Take 1 capsule by mouth once daily.    DULoxetine (CYMBALTA) 60 MG capsule Take 1 capsule (60 mg total) by mouth once daily.    esomeprazole (NEXIUM) 40 MG capsule Take 1 capsule (40 mg total) by mouth before breakfast.    fluconazole (DIFLUCAN) 200 MG Tab Take 1 tablet (200 mg total) by mouth twice a week.    fluticasone propionate (FLONASE) 50 mcg/actuation nasal spray 2 sprays (100 mcg total) by Each Nostril route once daily.    hydroCHLOROthiazide (HYDRODIURIL) 25 MG tablet Take 1 tablet (25 mg total) by mouth once daily.    hydrocortisone (PROCTOSOL HC) 2.5 % rectal cream Place rectally 2 (two) times daily as needed for Hemorrhoids.    LINZESS 72 mcg Cap capsule Take 1 capsule by mouth once daily.    metroNIDAZOLE (METROGEL) 0.75 % gel AAA of face bid    montelukast (SINGULAIR) 10 mg tablet Take 10 mg by mouth once daily.    mupirocin (BACTROBAN) 2 % ointment Apply topically 2 (two) times daily.    naproxen (NAPROSYN) 500 MG tablet Take 1 tablet (500 mg total) by mouth 2 (two) times daily with meals.    nitrofurantoin, macrocrystal-monohydrate, (MACROBID) 100 MG capsule Take 1 capsule (100 mg total) by mouth every evening.    nystatin (MYCOSTATIN) powder Apply topically 2 (two) times daily.    nystatin-triamcinolone (MYCOLOG II) cream Apply topically 2 (two) times daily.    polyethylene glycol (GLYCOLAX) 17 gram PwPk Take 17 g by mouth.    predniSONE (DELTASONE) 2.5 MG tablet Take 1 tablet (2.5 mg total) by mouth once daily.    predniSONE (DELTASONE) 5 MG tablet Take 1 tablet (5 mg total) by mouth  once daily.    pregabalin (LYRICA) 75 MG capsule Take 1 capsule (75 mg total) by mouth 2 (two) times daily. (Patient taking differently: Take 75 mg by mouth once daily. )    sulfacetamide sodium, acne, 10 % Susp APPLY 1 APPLICATION TOPICALLY EVERY DAY    tiZANidine (ZANAFLEX) 4 MG tablet Take 2 tablets (8 mg total) by mouth once daily.    tolterodine (DETROL LA) 4 MG 24 hr capsule Take 1 capsule (4 mg total) by mouth every evening.    turmeric root extract 500 mg Cap Take 500 mg by mouth 2 (two) times daily.    upadacitinib (RINVOQ) 15 mg ER 24 hr tablet Take 1 tablet (15 mg total) by mouth once daily.    valACYclovir (VALTREX) 1000 MG tablet Take 1 tablet (1,000 mg total) by mouth once daily.    vancomycin (VANCOCIN) 500 mg injection      No current facility-administered medications for this visit.       REVIEW OF SYSTEMS:   GENERAL: No weight loss, malaise or fevers.   HEENT: No recent changes in vision or hearing   NECK: Negative for lumps, no difficulty with swallowing.   RESPIRATORY: Negative for cough, wheezing or shortness of breath, patient denies any recent URI.   CARDIOVASCULAR: Negative for chest pain, leg swelling or palpitations.   GI: Negative for abdominal discomfort, blood in stools or black stools or change in bowel habits.   MUSCULOSKELETAL: See HPI.   SKIN: Negative for lesions, rash, and itching.   PSYCH: No mood disorder or recent psychosocial stressors. Patients sleep is not disturbed secondary to pain.   HEMATOLOGY/LYMPHOLOGY: Negative for prolonged bleeding, bruising easily or swollen nodes. Patient is not currently taking any anti-coagulants   NEURO: No history of headaches, syncope, paralysis, seizures or tremors.   All other reviewed and negative other than HPI.   OBJECTIVE:   Respiratory:  Nonlabored breathing  Psych:  Normal affect    ASSESSMENT: 59 y.o. year old female with cervical and lumbar pain, consistent with   Cervical spondylosis  Lumbar spondylosis  Cervical  radiculopathy  PLAN:   -will schedule for C7/T1 interlaminar epidural steroid injection for cervical radiculopathy  -continue all medications as prescribed  -follow up in clinic 4 weeks after the injection  The above plan and management options were discussed at length with patient. Patient is in agreement with the above and verbalized understanding. It will be communicated with the referring physician via electronic record, fax, or mail.   Fady Salmon   05/29/2020                           This service was not originating from a related E/M service provided within the previous 7 days nor will  to an E/M service or procedure within the next 24 hours or my soonest available appointment.  Prevailing standard of care was able to be met in this audio-only visit.

## 2020-05-29 NOTE — TELEPHONE ENCOUNTER
Was able to get patient scheduled for Audio visit to discuses MRI results all questions answered//dp

## 2020-05-29 NOTE — TELEPHONE ENCOUNTER
----- Message from Cipriano Jasmine sent at 5/29/2020  8:54 AM CDT -----  Contact: Pt  Pt is calling the staff regarding a new medication prescribed and the pt stated that the pharamcy asked if the staff had mention two weeks to start medication after the shingle shot was given. Pt has questions.    Pt call back to be advised [phone]    Thanks

## 2020-05-29 NOTE — H&P (VIEW-ONLY)
Established Patient - Audio Only Telehealth Visit     The patient location is:  At home  The chief complaint leading to consultation is:  Neck and back pain  Visit type: Virtual visit with audio only (telephone)  Total time spent with patient:  18 min 2 sec       The reason for the audio only service rather than synchronous audio and video virtual visit was related to technical difficulties or patient preference/necessity.     Each patient to whom I provide medical services by telemedicine is:  (1) informed of the relationship between the physician and patient and the respective role of any other health care provider with respect to management of the patient; and (2) notified that they may decline to receive medical services by telemedicine and may withdraw from such care at any time. Patient verbally consented to receive this service via voice-only telephone call.      SUBJECTIVE:   Rivka Jurado presents tele-medicine appointment for a follow-up appointment for neck and low back pain.  She has recently undergone bilateral lumbar medial branch blocks with steroid which has provided significant pain relief for the lumbar spine.  She does find that when she 1st wakes up in the morning or like yesterday when she had a lay on the MRI table for an hour that she is stiff when she 1st tries to get up however with activity her symptoms do improve in her low back feels much better.  She recent underwent cervical and lumbar MRI for updates and evaluations and she would like to have the results during this phone call.  She continues to have numbness and tingling in her bilateral upper extremities.  She denies having numbness and weakness in her legs bilaterally.  She has been taking baclofen, Cymbalta, naproxen and Lyrica which all do provide some symptomatic pain relief.  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/2017    [facility]/18/[facility]    [facility]    Recurrent UTI 10/13/2017    Right bundle branch block     Right foot drop 10/13/2017    Urinary incontinence 10/13/2017    Weakness of right lower extremity 10/13/2017      Past Surgical History:   Procedure Laterality Date    BREAST SURGERY      breast lump removed    CARDIAC CATHETERIZATION Right 03/2018    wrist    CHOLECYSTECTOMY      ENDOSCOPIC ULTRASOUND OF UPPER GASTROINTESTINAL TRACT N/A 4/9/2019    Procedure: ULTRASOUND, UPPER GI TRACT, ENDOSCOPIC;  Surgeon: Demario Menjivar MD;  Location: [facility];  Service: Endoscopy;  Laterality: N/A;    ENDOSCOPIC ULTRASOUND OF UPPER GASTROINTESTINAL TRACT N/A 5/28/2019    Procedure: ULTRASOUND, UPPER GI TRACT, ENDOSCOPIC;  Surgeon: Justin Munoz MD;  Location: [facility];  Service: Endoscopy;  Laterality: N/A;    foot drop surgery      HYSTERECTOMY      INJECTION OF ANESTHETIC AGENT AROUND MEDIAL BRANCH NERVES INNERVATING LUMBAR FACET JOINT Bilateral 8/9/2019    Procedure: Block-nerve-medial branch-lumbar;  Surgeon: Fady Salmon MD;  [facility];  Service: Pain Management;  Laterality: Bilateral;    INJECTION OF ANESTHETIC AGENT AROUND MEDIAL BRANCH NERVES INNERVATING LUMBAR FACET JOINT Bilateral 11/5/2019    Procedure: Bilateral L3-5 MBB;  Surgeon: Fady Salmon MD;  Location: [facility];  Service: Pain Management;  Laterality: Bilateral;    INJECTION OF ANESTHETIC AGENT AROUND MEDIAL BRANCH NERVES INNERVATING LUMBAR FACET  JOINT Bilateral 5/5/2020    Procedure: Bilateral L3-5 MBB with steroid;  Surgeon: Fady Salmon MD;  Location: [facility];  Service: Pain Management;  Laterality: Bilateral;    KNEE SURGERY      13     RECTAL SURGERY      TONSILLECTOMY        Social History     Socioeconomic History    Marital status:      Spouse name: Not on file    Number of children: Not on file    Years of education: Not on file    Highest education level: Not on file   Occupational History    Not on file   Social Needs    Financial resource strain: Not on file    Food insecurity:     Worry: Not on file     Inability: Not on file    Transportation needs:     Medical: Not on file     Non-medical: Not on file   Tobacco Use    Smoking status: Never Smoker    Smokeless tobacco: Never Used   Substance and Sexual Activity    Alcohol use: No    Drug use: Never    Sexual activity: Not Currently     Partners: Male   Lifestyle    Physical activity:     Days per week: Not on file     Minutes per session: Not on file    Stress: Not on file   Relationships    Social connections:     Talks on phone: Not on file     Gets together: Not on file     Attends Scientologist service: Not on file     Active member of club or organization: Not on file     Attends meetings of clubs or organizations: Not on file     Relationship status: Not on file   Other Topics Concern    Not on file   Social History Narrative    Not on file      Family History   Problem Relation Age of Onset    Colon cancer Mother     Prostate cancer Father     Cancer Sister       Review of patient's allergies indicates:   Allergen Reactions    Clindamycin Diarrhea and Nausea And Vomiting    Lisinopril Other (See Comments)     Low BP    Morphine Nausea And Vomiting    Stadol [butorphanol tartrate] Hallucinations      Current Outpatient Medications   Medication Sig    baclofen (LIORESAL) 10 MG tablet Take 1 tablet (10 mg total) by mouth nightly as needed.     benazepriL (LOTENSIN) 20 MG tablet Take 1 tablet (20 mg total) by mouth once daily.    cholestyramine, with sugar, 4 gram Powd TAKE 4 GRAMS BY MOUTH EVERY DAY    clonazePAM (KLONOPIN) 1 MG tablet Take 1 tablet (1 mg total) by mouth 2 (two) times daily.    clotrimazole-betamethasone 1-0.05% (LOTRISONE) cream Apply topically 2 (two) times daily as needed.    CREON 24,000-76,000 -120,000 unit capsule Take 1 capsule by mouth once daily.    DULoxetine (CYMBALTA) 60 MG capsule Take 1 capsule (60 mg total) by mouth once daily.    esomeprazole (NEXIUM) 40 MG capsule Take 1 capsule (40 mg total) by mouth before breakfast.    fluconazole (DIFLUCAN) 200 MG Tab Take 1 tablet (200 mg total) by mouth twice a week.    fluticasone propionate (FLONASE) 50 mcg/actuation nasal spray 2 sprays (100 mcg total) by Each Nostril route once daily.    hydroCHLOROthiazide (HYDRODIURIL) 25 MG tablet Take 1 tablet (25 mg total) by mouth once daily.    hydrocortisone (PROCTOSOL HC) 2.5 % rectal cream Place rectally 2 (two) times daily as needed for Hemorrhoids.    LINZESS 72 mcg Cap capsule Take 1 capsule by mouth once daily.    metroNIDAZOLE (METROGEL) 0.75 % gel AAA of face bid    montelukast (SINGULAIR) 10 mg tablet Take 10 mg by mouth once daily.    mupirocin (BACTROBAN) 2 % ointment Apply topically 2 (two) times daily.    naproxen (NAPROSYN) 500 MG tablet Take 1 tablet (500 mg total) by mouth 2 (two) times daily with meals.    nitrofurantoin, macrocrystal-monohydrate, (MACROBID) 100 MG capsule Take 1 capsule (100 mg total) by mouth every evening.    nystatin (MYCOSTATIN) powder Apply topically 2 (two) times daily.    nystatin-triamcinolone (MYCOLOG II) cream Apply topically 2 (two) times daily.    polyethylene glycol (GLYCOLAX) 17 gram PwPk Take 17 g by mouth.    predniSONE (DELTASONE) 2.5 MG tablet Take 1 tablet (2.5 mg total) by mouth once daily.    predniSONE (DELTASONE) 5 MG tablet Take 1 tablet (5 mg total) by mouth  once daily.    pregabalin (LYRICA) 75 MG capsule Take 1 capsule (75 mg total) by mouth 2 (two) times daily. (Patient taking differently: Take 75 mg by mouth once daily. )    sulfacetamide sodium, acne, 10 % Susp APPLY 1 APPLICATION TOPICALLY EVERY DAY    tiZANidine (ZANAFLEX) 4 MG tablet Take 2 tablets (8 mg total) by mouth once daily.    tolterodine (DETROL LA) 4 MG 24 hr capsule Take 1 capsule (4 mg total) by mouth every evening.    turmeric root extract 500 mg Cap Take 500 mg by mouth 2 (two) times daily.    upadacitinib (RINVOQ) 15 mg ER 24 hr tablet Take 1 tablet (15 mg total) by mouth once daily.    valACYclovir (VALTREX) 1000 MG tablet Take 1 tablet (1,000 mg total) by mouth once daily.    vancomycin (VANCOCIN) 500 mg injection      No current facility-administered medications for this visit.       REVIEW OF SYSTEMS:   GENERAL: No weight loss, malaise or fevers.   HEENT: No recent changes in vision or hearing   NECK: Negative for lumps, no difficulty with swallowing.   RESPIRATORY: Negative for cough, wheezing or shortness of breath, patient denies any recent URI.   CARDIOVASCULAR: Negative for chest pain, leg swelling or palpitations.   GI: Negative for abdominal discomfort, blood in stools or black stools or change in bowel habits.   MUSCULOSKELETAL: See HPI.   SKIN: Negative for lesions, rash, and itching.   PSYCH: No mood disorder or recent psychosocial stressors. Patients sleep is not disturbed secondary to pain.   HEMATOLOGY/LYMPHOLOGY: Negative for prolonged bleeding, bruising easily or swollen nodes. Patient is not currently taking any anti-coagulants   NEURO: No history of headaches, syncope, paralysis, seizures or tremors.   All other reviewed and negative other than HPI.   OBJECTIVE:   Respiratory:  Nonlabored breathing  Psych:  Normal affect    ASSESSMENT: 59 y.o. year old female with cervical and lumbar pain, consistent with   Cervical spondylosis  Lumbar spondylosis  Cervical  radiculopathy  PLAN:   -will schedule for C7/T1 interlaminar epidural steroid injection for cervical radiculopathy  -continue all medications as prescribed  -follow up in clinic 4 weeks after the injection  The above plan and management options were discussed at length with patient. Patient is in agreement with the above and verbalized understanding. It will be communicated with the referring physician via electronic record, fax, or mail.   Fady Salmon   05/29/2020                           This service was not originating from a related E/M service provided within the previous 7 days nor will  to an E/M service or procedure within the next 24 hours or my soonest available appointment.  Prevailing standard of care was able to be met in this audio-only visit.

## 2020-05-29 NOTE — TELEPHONE ENCOUNTER
----- Message from Cipriano Jasmine sent at 5/29/2020  8:52 AM CDT -----  Contact: Pt  Pt is calling the staff regarding test results of an MRI done on yesterday  Pt call back to be advised [phone]    Thanks

## 2020-06-01 ENCOUNTER — TELEPHONE (OUTPATIENT)
Dept: PHARMACY | Facility: CLINIC | Age: 60
End: 2020-06-01

## 2020-06-01 NOTE — TELEPHONE ENCOUNTER
Initial Rinvoq consult completed on 20 . Rinvoq will be shipped on  to arrive at patient's home on 6/3/2020 via [employer]. $ 3.90 copay. Patient will start Rinvoq on 6/3/20. Address confirmed, CC on file. Confirmed 2 patient identifiers - name and . Therapy Appropriate.     Patient was counseled on the administration directions for Rinvoq:  -Take 1 tablet (15mg) by mouth once daily, with or without food  -If dose is missed, skip the missed dose and go back to your normal time.  Do not take 2 doses at the same time or extra doses    Patient was counseled on the following possible side effects, which include, but are not limited to:   -diarrhea, headache, nausea, cough. Contact provider if allergic reaction, changes in heartbeat, muscle pain or weakness, signs of infection, liver problems - dark urine, fatigue, light-colored stools, yellow skin or eyes, signs of thrombosis.       DDIs:  Medication list reviewed      Patient confirmed understanding. Patient did not have additional questions.  Patient will start Rinvoq on 6/3/20. Consultation included: indication; goals of treatment; administration; storage and handling; side effects; how to handle side effects; the importance of compliance; how to handle missed doses; the importance of laboratory monitoring; the importance of keeping all follow up appointments.  Patient understands to report any medication changes to [facility] and provider. All questions answered and addressed to patients satisfaction. [facility] to contact patient in 3 weeks for refills.

## 2020-06-02 ENCOUNTER — TELEPHONE (OUTPATIENT)
Dept: PAIN MEDICINE | Facility: CLINIC | Age: 60
End: 2020-06-02

## 2020-06-02 ENCOUNTER — TELEPHONE (OUTPATIENT)
Dept: RHEUMATOLOGY | Facility: CLINIC | Age: 60
End: 2020-06-02

## 2020-06-02 DIAGNOSIS — Z03.818 ENCOUNTER FOR OBSERVATION FOR SUSPECTED EXPOSURE TO OTHER BIOLOGICAL AGENTS RULED OUT: Primary | ICD-10-CM

## 2020-06-02 NOTE — TELEPHONE ENCOUNTER
Contacted pt. Injection scheduled. Pre injection instructions taught. Covid Orders entered. Pt was able to verbalize all understanding. All questions answered.//dp

## 2020-06-02 NOTE — TELEPHONE ENCOUNTER
----- Message from Michelle Brandt sent at 6/2/2020  2:07 PM CDT -----  Contact: pt  .Type:  Patient Returning Call    Who Called: pt  Who Left Message for Patient:   Does the patient know what this is regarding?: shingles vaccination   Would the patient rather a call back or a response via [facility]?  Call back   Best Call Back Number: [phone]  Additional Information: if no answer call tomorrow pt will be home

## 2020-06-02 NOTE — TELEPHONE ENCOUNTER
----- Message from Rupali Retana sent at 6/2/2020  9:19 AM CDT -----  Contact: Patient  Patient is checking on status of orders for an injection, please call her back at [phone]. Thank you

## 2020-06-05 ENCOUNTER — TELEPHONE (OUTPATIENT)
Dept: PAIN MEDICINE | Facility: CLINIC | Age: 60
End: 2020-06-05

## 2020-06-05 NOTE — TELEPHONE ENCOUNTER
Called pt to moved f/u appt on 07/22 to in clinic visit instead of phone call . Pt sated she needed to move inj , due to an afternoon time not working and needing morning . Moved to June 17. Also moved follow to in clinic 4 weeks later . Pt understood. All questions answered.   Naeem Powers MA  [facility]

## 2020-06-11 NOTE — PRE-PROCEDURE INSTRUCTIONS
Spoke with patient regarding procedure scheduled on 2020  Arrival time 0600  Has patient been sick with fever or on antibiotics within the last 7 days? no  Has patient received a vaccination within the last 7 days? no  Has the patient stopped all medications as directed? Yes multivitamin last dose   Does patient have a pacemaker and or defibrillator? no  Does the patient have a ride to and from procedure and someone reliable to remain with patient? [facility] [phone]  Is the patient diabetic? no  Does the patient have sleep apnea? Or use O2 at home? no  Is the patient receiving sedation? yes  Is the patient instructed to remain NPO beginning at midnight the night before their procedure? yes  Procedure location confirmed with patient? yes    Covid testin/15/2020 110 pm [place]

## 2020-06-15 ENCOUNTER — LAB VISIT (OUTPATIENT)
Dept: OTOLARYNGOLOGY | Facility: CLINIC | Age: 60
End: 2020-06-15
Payer: COMMERCIAL

## 2020-06-15 DIAGNOSIS — Z01.818 PRE-OP TESTING: Primary | ICD-10-CM

## 2020-06-15 PROCEDURE — U0003 INFECTIOUS AGENT DETECTION BY NUCLEIC ACID (DNA OR RNA); SEVERE ACUTE RESPIRATORY SYNDROME CORONAVIRUS 2 (SARS-COV-2) (CORONAVIRUS DISEASE [COVID-19]), AMPLIFIED PROBE TECHNIQUE, MAKING USE OF HIGH THROUGHPUT TECHNOLOGIES AS DESCRIBED BY CMS-2020-01-R: HCPCS

## 2020-06-16 ENCOUNTER — TELEPHONE (OUTPATIENT)
Dept: INTERNAL MEDICINE | Facility: CLINIC | Age: 60
End: 2020-06-16

## 2020-06-16 DIAGNOSIS — Z03.818 ENCOUNTER FOR OBSERVATION FOR SUSPECTED EXPOSURE TO OTHER BIOLOGICAL AGENTS RULED OUT: Primary | ICD-10-CM

## 2020-06-16 NOTE — TELEPHONE ENCOUNTER
----- Message from Irina Cardenas sent at 6/16/2020  2:38 PM CDT -----  Regarding: COVID RESULTS  Contact: PATIENT  Type:  Test Results    Who Called: PATIENT  Name of Test (Lab/Mammo/Etc): COVID 19  Date of Test: YESTERDAY  Ordering Provider: TERE  Where the test was performed: [facility]  Would the patient rather a call back or a response via [facility]? CALL  Best Call Back Number: [phone]  Additional Information:  PLEASE CALL PATIENT ASAP TODAY

## 2020-06-16 NOTE — TELEPHONE ENCOUNTER
Spoke with patient in regards to phone message. Nurse informed patient that her Covid labs were still pending and did not come back yet, verbalized understanding.//KM

## 2020-06-17 ENCOUNTER — HOSPITAL ENCOUNTER (OUTPATIENT)
Facility: HOSPITAL | Age: 60
Discharge: HOME | End: 2020-06-17
Attending: PAIN MEDICINE | Admitting: PAIN MEDICINE
Payer: COMMERCIAL

## 2020-06-17 VITALS
DIASTOLIC BLOOD PRESSURE: 66 MMHG | RESPIRATION RATE: 18 BRPM | SYSTOLIC BLOOD PRESSURE: 116 MMHG | HEIGHT: 63 IN | HEART RATE: 78 BPM | OXYGEN SATURATION: 96 % | WEIGHT: 288.81 LBS | BODY MASS INDEX: 51.17 KG/M2 | TEMPERATURE: 98 F

## 2020-06-17 DIAGNOSIS — M54.12 CERVICAL RADICULOPATHY: ICD-10-CM

## 2020-06-17 DIAGNOSIS — M47.812 CERVICAL SPONDYLOSIS: Primary | ICD-10-CM

## 2020-06-17 LAB — SARS-COV-2 RNA RESP QL NAA+PROBE: NOT DETECTED

## 2020-06-17 PROCEDURE — 99152 MOD SED SAME PHYS/QHP 5/>YRS: CPT | Performed by: PAIN MEDICINE

## 2020-06-17 PROCEDURE — 62321 PR INJ CERV/THORAC, W/GUIDANCE: ICD-10-PCS | Mod: ,,, | Performed by: PAIN MEDICINE

## 2020-06-17 PROCEDURE — 25500020 PHARM REV CODE 255: Performed by: PAIN MEDICINE

## 2020-06-17 PROCEDURE — 62321 NJX INTERLAMINAR CRV/THRC: CPT | Performed by: PAIN MEDICINE

## 2020-06-17 PROCEDURE — 25000003 PHARM REV CODE 250: Performed by: PAIN MEDICINE

## 2020-06-17 PROCEDURE — 63600175 PHARM REV CODE 636 W HCPCS: Performed by: PAIN MEDICINE

## 2020-06-17 PROCEDURE — 62321 NJX INTERLAMINAR CRV/THRC: CPT | Mod: ,,, | Performed by: PAIN MEDICINE

## 2020-06-17 RX ORDER — MIDAZOLAM HYDROCHLORIDE 1 MG/ML
INJECTION, SOLUTION INTRAMUSCULAR; INTRAVENOUS
Status: DISCONTINUED | OUTPATIENT
Start: 2020-06-17 | End: 2020-06-17 | Stop reason: HOSPADM

## 2020-06-17 RX ORDER — FENTANYL CITRATE 50 UG/ML
INJECTION, SOLUTION INTRAMUSCULAR; INTRAVENOUS
Status: DISCONTINUED | OUTPATIENT
Start: 2020-06-17 | End: 2020-06-17 | Stop reason: HOSPADM

## 2020-06-17 RX ORDER — DEXAMETHASONE SODIUM PHOSPHATE 100 MG/10ML
INJECTION INTRAMUSCULAR; INTRAVENOUS
Status: DISCONTINUED | OUTPATIENT
Start: 2020-06-17 | End: 2020-06-17 | Stop reason: HOSPADM

## 2020-06-17 RX ORDER — LIDOCAINE HYDROCHLORIDE 10 MG/ML
INJECTION, SOLUTION EPIDURAL; INFILTRATION; INTRACAUDAL; PERINEURAL
Status: DISCONTINUED | OUTPATIENT
Start: 2020-06-17 | End: 2020-06-17 | Stop reason: HOSPADM

## 2020-06-17 NOTE — OP NOTE
PROCEDURE: Cervical epidural steroid injection under fluoroscopic guidance    LEVEL: C7/T1   SIDE: Midline     PROCEDURE DATE: 6/17/2020    PREOPERATIVE DIAGNOSIS: Cervical radiculopathy  POSTOPERATIVE DIAGNOSIS: Cervical radiculopathy    PROVIDER: LUPE Salmon MD  Assistant(s): None    ANESTHESIA: Local, IV Sedation    >> 1 mg of VERSED    >> 50 mcg of FENTANYL     INDICATION: The patient has neck pain and radiculopathy symptoms unresponsive to conservative treatments. Fluoroscopy was used to optimize visualization of needle placement and to maximize safety.        PROCEDURE DESCRIPTION / TECHNIQUE:   The patient was seen and identified in the preoperative area. Risks, benefits, complications, and alternatives were discussed with the patient. The patient agreed to proceed with the procedure and signed the consent. The site and side of the procedure was identified and marked. An IV was started.    The patient was taken to the procedural suite. The patient was positioned in prone orientation on procedure table. A time out was performed prior to any intervention. The procedure, site, side, and allergies were stated and agreed to by all present. The posterior cervical area was widely prepped with ChloraPrep. The procedural site was draped in usual sterile fashion. Vital signs were closely monitored throughout this procedure. Conscious sedation was used for this procedure to decrease patient anxiety.    Using anterior-posterior fluoroscopy, the above noted INTERLAMINAR SPACE was identified and the overlying subcutaneous tissues were infiltrated with 3-4 mL of PF 1% LIDOCAINE using a 1.5 inch 27 gauge needle. A 20-gauge Tuohy epidural needle was then introduced through the same puncture site and advanced toward the epidural space incrementally under fluoroscopic guidance. A Loss of Resistance technique was used as the epidural needle was advanced. Contralateral oblique imaging was used to help gauge needle depth as  the Tuohy was advanced. Once loss of resistance was realized and after negative aspiration of blood and spinal fluid, correct needle placement within the epidural space was verified with the injection of 0.5 to 1 mL of water soluble contrast dye (Omnipaque 240). Appropriate epidural contrast spread was seen on imaging. Again, after negative aspiration of blood and spinal fluid a 3 mL mixture containing 1 mL of Dexamethasone (10 mg/mL) and 1 mL of preservative free Normal Saline and 1ml of preservative free 1% lidocaine was injected. No pain or paresthesias were noted with injection. There was low resistance during the injection. Washout of epidurogram was seen on fluoroscopy following injection. The stylet was replaced and the Tuohy needle was withdrawn intact. The skin was cleansed, and bandages were applied.    Description of Findings: Not applicable    Prosthetic devices, grafts, tissues, or devices implanted: None    Specimen Removed: No    Estimated Blood Loss: minimal    COMPLICATIONS: None    DISPOSITION / PLANS: The patient was transferred to the recovery area in a stable condition for observation. The patient was reexamined prior to discharge. There was no evidence of acute neurologic injury following the procedure.  Patient was discharged from the recovery room after meeting discharge criteria. Home discharge instructions were given to the patient by the staff.

## 2020-06-17 NOTE — DISCHARGE INSTRUCTIONS

## 2020-06-18 NOTE — DISCHARGE SUMMARY
[facility]  Short Stay  Discharge Summary    Admit Date: 6/17/2020    Discharge Date and Time: 6/17/2020  7:58 AM      Discharge Attending Physician: LUPE Salmon MD     Hospital Course (synopsis of major diagnoses, care, treatment, and services provided during the course of the hospital stay): Patient was admitted to Pre-op where informed consent was signed.  The patient was then taken to the procedure suite where the procedure was performed.  The patient was then return to the Pre-Op area and discharge was performed.     Final Diagnoses:    Principal Problem: <principal problem not specified>   Secondary Diagnoses:   Active Hospital Problems    Diagnosis  POA    Cervical radiculopathy [M54.12]  Yes      Resolved Hospital Problems   No resolved problems to display.       Discharged Condition: good    Disposition: Home or Self Care    Follow up/Patient Instructions:    Medications:  Reconciled Home Medications:      Medication List      CHANGE how you take these medications    pregabalin 75 MG capsule  Commonly known as: LYRICA  Take 1 capsule (75 mg total) by mouth 2 (two) times daily.  What changed: when to take this        CONTINUE taking these medications    baclofen 10 MG tablet  Commonly known as: LIORESAL  Take 1 tablet (10 mg total) by mouth nightly as needed.     benazepriL 20 MG tablet  Commonly known as: LOTENSIN  Take 1 tablet (20 mg total) by mouth once daily.     clonazePAM 1 MG tablet  Commonly known as: KLONOPIN  Take 1 tablet (1 mg total) by mouth 2 (two) times daily.     clotrimazole-betamethasone 1-0.05% cream  Commonly known as: LOTRISONE  Apply topically 2 (two) times daily as needed.     DULoxetine 60 MG capsule  Commonly known as: CYMBALTA  Take 1 capsule (60 mg total) by mouth once daily.     esomeprazole 40 MG capsule  Commonly known as: NEXIUM  Take 1 capsule (40 mg total) by mouth before breakfast.     fluconazole 200 MG Tab  Commonly known as: DIFLUCAN  Take 1 tablet  (200 mg total) by mouth twice a week.     fluticasone propionate 50 mcg/actuation nasal spray  Commonly known as: FLONASE  2 sprays (100 mcg total) by Each Nostril route once daily.     hydroCHLOROthiazide 25 MG tablet  Commonly known as: HYDRODIURIL  Take 1 tablet (25 mg total) by mouth once daily.     hydrocortisone 2.5 % rectal cream  Commonly known as: PROCTOSOL HC  Place rectally 2 (two) times daily as needed for Hemorrhoids.     LINZESS 72 mcg Cap capsule  Generic drug: linaCLOtide  Take 1 capsule by mouth once daily.     metroNIDAZOLE 0.75 % gel  Commonly known as: METROGEL  AAA of face bid     mupirocin 2 % ointment  Commonly known as: BACTROBAN  Apply topically 2 (two) times daily.     nitrofurantoin (macrocrystal-monohydrate) 100 MG capsule  Commonly known as: MACROBID  Take 1 capsule (100 mg total) by mouth every evening.     nystatin powder  Commonly known as: MYCOSTATIN  Apply topically 2 (two) times daily.     nystatin-triamcinolone cream  Commonly known as: MYCOLOG II  Apply topically 2 (two) times daily.     polyethylene glycol 17 gram Pwpk  Commonly known as: GLYCOLAX  Take 17 g by mouth.     * predniSONE 5 MG tablet  Commonly known as: DELTASONE  Take 1 tablet (5 mg total) by mouth once daily.     * predniSONE 2.5 MG tablet  Commonly known as: DELTASONE  Take 1 tablet (2.5 mg total) by mouth once daily.     RINVOQ 15 mg 24 hr tablet  Generic drug: upadacitinib  Take 1 tablet (15 mg total) by mouth once daily.     sulfacetamide sodium (acne) 10 % Susp  APPLY 1 APPLICATION TOPICALLY EVERY DAY     tiZANidine 4 MG tablet  Commonly known as: ZANAFLEX  Take 2 tablets (8 mg total) by mouth once daily.     tolterodine 4 MG 24 hr capsule  Commonly known as: DETROL LA  Take 1 capsule (4 mg total) by mouth every evening.     turmeric root extract 500 mg Cap  Take 500 mg by mouth 2 (two) times daily.     valACYclovir 1000 MG tablet  Commonly known as: VALTREX  Take 1 tablet (1,000 mg total) by mouth once  daily.     vancomycin 500 mg injection  Commonly known as: VANCOCIN         * This list has 2 medication(s) that are the same as other medications prescribed for you. Read the directions carefully, and ask your doctor or other care provider to review them with you.              No discharge procedures on file.

## 2020-07-06 ENCOUNTER — TELEPHONE (OUTPATIENT)
Dept: PAIN MEDICINE | Facility: CLINIC | Age: 60
End: 2020-07-06

## 2020-07-06 NOTE — TELEPHONE ENCOUNTER
----- Message from Hillary Marcos sent at 7/6/2020  4:54 PM CDT -----  Type:  Needs Medical Advice    Who Called: Rivka Jurado  Would the patient rather a call back or a response via [facility]? Call back   Best Call Back Number: [phone] (cell)   Additional Information: Patient wanted to make sure that it is a Mychart Virtual Visit Only.???? Patient's appointment is July 15/20. Patient stated that she received a letter for different date.

## 2020-07-07 ENCOUNTER — TELEPHONE (OUTPATIENT)
Dept: INTERNAL MEDICINE | Facility: CLINIC | Age: 60
End: 2020-07-07

## 2020-07-07 NOTE — TELEPHONE ENCOUNTER
----- Message from Kennedy Singh MD sent at 7/6/2020  6:04 PM CDT -----  Prescription refilled  ----- Message -----  From: Torri Rodríguez LPN  Sent: 7/6/2020  11:08 AM CDT  To: Kennedy Singh MD      ----- Message -----  From: Hillary Rodríguez  Sent: 7/6/2020   8:24 AM CDT  To: [facility] Staff    Type:  RX Refill Request    Who Called: Rivka Jurado  Refill or New Rx: REFILL  RX Name and Strength: tiZANidine (ZANAFLEX) 4 MG tablet  How is the patient currently taking it? (ex. 1XDay): Take 2 tablets (8 mg total) by mouth once daily. - Oral  Is this a 30 day or 90 day RX: 30   Preferred Pharmacy with phone number:  [facility] -- [place] - [place], [place] - [address] [address]  [address] [address]  Phone: [phone] Fax: [phone]  Local or Mail Order:MAIL ORDER  Ordering Provider:Kennedy Singh  Would the patient rather a call back or a response via [facility]? Call back   Best Call Back Number:[phone] (cell)   Additional Information: Patient is stating that she is completley out .

## 2020-07-15 ENCOUNTER — TELEPHONE (OUTPATIENT)
Dept: PAIN MEDICINE | Facility: CLINIC | Age: 60
End: 2020-07-15

## 2020-07-15 ENCOUNTER — OFFICE VISIT (OUTPATIENT)
Dept: PAIN MEDICINE | Facility: CLINIC | Age: 60
End: 2020-07-15
Payer: MEDICARE

## 2020-07-15 ENCOUNTER — PATIENT OUTREACH (OUTPATIENT)
Dept: ADMINISTRATIVE | Facility: OTHER | Age: 60
End: 2020-07-15

## 2020-07-15 ENCOUNTER — LAB VISIT (OUTPATIENT)
Dept: LAB | Facility: HOSPITAL | Age: 60
End: 2020-07-15
Attending: PAIN MEDICINE
Payer: COMMERCIAL

## 2020-07-15 VITALS
HEART RATE: 98 BPM | HEIGHT: 63 IN | BODY MASS INDEX: 51.17 KG/M2 | WEIGHT: 288.81 LBS | DIASTOLIC BLOOD PRESSURE: 90 MMHG | SYSTOLIC BLOOD PRESSURE: 152 MMHG

## 2020-07-15 DIAGNOSIS — M47.816 LUMBAR SPONDYLOSIS: Primary | ICD-10-CM

## 2020-07-15 DIAGNOSIS — M47.816 LUMBAR SPONDYLOSIS: ICD-10-CM

## 2020-07-15 DIAGNOSIS — Z03.818 ENCOUNTER FOR OBSERVATION FOR SUSPECTED EXPOSURE TO OTHER BIOLOGICAL AGENTS RULED OUT: Primary | ICD-10-CM

## 2020-07-15 LAB — ERYTHROCYTE [SEDIMENTATION RATE] IN BLOOD BY WESTERGREN METHOD: 19 MM/HR (ref 0–36)

## 2020-07-15 PROCEDURE — 99214 PR OFFICE/OUTPT VISIT, EST, LEVL IV, 30-39 MIN: ICD-10-PCS | Mod: S$GLB,,, | Performed by: PAIN MEDICINE

## 2020-07-15 PROCEDURE — 3077F PR MOST RECENT SYSTOLIC BLOOD PRESSURE >= 140 MM HG: ICD-10-PCS | Mod: CPTII,S$GLB,, | Performed by: PAIN MEDICINE

## 2020-07-15 PROCEDURE — 85652 RBC SED RATE AUTOMATED: CPT

## 2020-07-15 PROCEDURE — 99214 OFFICE O/P EST MOD 30 MIN: CPT | Mod: S$GLB,,, | Performed by: PAIN MEDICINE

## 2020-07-15 PROCEDURE — 86140 C-REACTIVE PROTEIN: CPT

## 2020-07-15 PROCEDURE — 3077F SYST BP >= 140 MM HG: CPT | Mod: CPTII,S$GLB,, | Performed by: PAIN MEDICINE

## 2020-07-15 PROCEDURE — 3008F PR BODY MASS INDEX (BMI) DOCUMENTED: ICD-10-PCS | Mod: CPTII,S$GLB,, | Performed by: PAIN MEDICINE

## 2020-07-15 PROCEDURE — 99999 PR PBB SHADOW E&M-EST. PATIENT-LVL IV: ICD-10-PCS | Mod: PBBFAC,,, | Performed by: PAIN MEDICINE

## 2020-07-15 PROCEDURE — 3080F DIAST BP >= 90 MM HG: CPT | Mod: CPTII,S$GLB,, | Performed by: PAIN MEDICINE

## 2020-07-15 PROCEDURE — 99999 PR PBB SHADOW E&M-EST. PATIENT-LVL IV: CPT | Mod: PBBFAC,,, | Performed by: PAIN MEDICINE

## 2020-07-15 PROCEDURE — 36415 COLL VENOUS BLD VENIPUNCTURE: CPT

## 2020-07-15 PROCEDURE — 3080F PR MOST RECENT DIASTOLIC BLOOD PRESSURE >= 90 MM HG: ICD-10-PCS | Mod: CPTII,S$GLB,, | Performed by: PAIN MEDICINE

## 2020-07-15 PROCEDURE — 3008F BODY MASS INDEX DOCD: CPT | Mod: CPTII,S$GLB,, | Performed by: PAIN MEDICINE

## 2020-07-15 RX ORDER — METHYLPREDNISOLONE 4 MG/1
TABLET ORAL
Qty: 1 PACKAGE | Refills: 0 | Status: SHIPPED | OUTPATIENT
Start: 2020-07-15 | End: 2020-07-29 | Stop reason: ALTCHOICE

## 2020-07-15 NOTE — H&P (VIEW-ONLY)
Chief Pain Complaint:  Left Knee Pain    History of Present Illness:   Ms. Jurado returns to clinic for follow-up.  She underwent cervical spine epidural steroid injection C7-T1 on May 29, 2020 and reports 100% symptomatic pain relief.  Unfortunately her low back has started to give her trouble again which we have performed numerous lumbar medial branch blocks with steroid which has provided good 100% symptomatic pain relief for her for several months.  Today she rates her low back pain as an 8/10 which is worse with activity and somewhat improved with rest.  She and her fiance have purchased a house and therefore she is starting to pack and will be packing lots of boxes over the next few months in addition to getting  in October therefore she would like a more permanent fix for the low back.  She has been on prednisone in past however this has recently been tapered down by Rheumatology.  Medrol Dosepaks have helped her tremendously in the past.  Initial HPI:  Ms. Jurado returns to clinic for follow-up.  She underwent bilateral lumbar medial branch blocks yesterday with steroid however today she is here for a left intra-articular knee joint injection.  She has undergone intra-articular knee injections in the past however this caused increased pain so she is very nervous about having injection today.  She currently rates her pain as a 4/10 which is primarily located the medial lateral joint lines of the left knee.  She has arthritis in the knee and has had a knee replacement on the right side which has lead to further complications therefore she is trying to avoid knee replacement on the left if at all possible.  She denies having numbness or weakness in the left leg.  Her symptoms are improved with sitting and worse with standing and walking long distances.    Initial HPI:  This patient is a 59 y.o. female who presents today complaining of the above noted pain/s. The patient describes the pain as follows.  Ms. 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  She has a history of low back pain which has been present for many years.  [place] where she was seen by several different pain physicians including Dr. Micah Reyes and Dr. Salomon performed several injections in her lumbar spine which have provided 6 -9 months of pain relief with each injection. [place] she sought to establish care with Dr. Lara at [facility] however after her 1st visitation she decided to seek treatment elsewhere.  After she had the unfortunate incident with the dislocation of the knee replacement she was bed-bound for approximately 24 months which ultimately led to a lot of her increased low back pain. She denies having radiation into her lower extremities and denies having weakness in her legs however she does have numbness in the right lower extremity secondary to numerous surgeries on the foot and ankle.  She denies having difficulty with bowel bladder.  She has taken Lyrica in the past which has provided some benefit however insurance no longer covers this medication and she has been taking naproxen which provides some benefit.    Previous Therapy:  Medications:  Lyrica, naproxen, Cymbalta  Injections:  Numerous lumbar injections in the past, unsure if these are epidural steroid injections or medial branch blocks; lumbar medial branch blocks with steroid, C7/T1 interlaminar epidural steroid injection  Surgeries:  Numerous right lower extremity surgeries  Physical Therapy:  Has participated in physical therapy in the past    Past Surgical History:   Procedure  Laterality Date    BREAST SURGERY      breast lump removed    CARDIAC CATHETERIZATION Right 03/2018    wrist    CHOLECYSTECTOMY      ENDOSCOPIC ULTRASOUND OF UPPER GASTROINTESTINAL TRACT N/A 4/9/2019    Procedure: ULTRASOUND, UPPER GI TRACT, ENDOSCOPIC;  Surgeon: Demario Menjivar MD;  Location: [facility] END[facility];  Service: Endoscopy;  Laterality: N/A;    ENDOSCOPIC ULTRASOUND OF UPPER GASTROINTESTINAL TRACT N/A 5/28/2019    Procedure: ULTRASOUND, UPPER GI TRACT, ENDOSCOPIC;  Surgeon: Justin Munoz MD;  Location: [facility] ENDO;  Service: Endoscopy;  Laterality: N/A;    EPIDURAL STEROID INJECTION INTO CERVICAL SPINE N/A 6/17/2020    Procedure: C7/T1 IL JAGDISH;  Surgeon: Fady Salmon MD;  Location: [facility] PAIN MGT;  Service: Pain Management;  Laterality: N/A;    foot drop surgery      HYSTERECTOMY      INJECTION OF ANESTHETIC AGENT AROUND MEDIAL BRANCH NERVES INNERVATING LUMBAR FACET JOINT Bilateral 8/9/2019    Procedure: Block-nerve-medial branch-lumbar;  Surgeon: Fady Salmon MD;  Location: [facility] PAIN MGT;  Service: Pain Management;  Laterality: Bilateral;    INJECTION OF ANESTHETIC AGENT AROUND MEDIAL BRANCH NERVES INNERVATING LUMBAR FACET JOINT Bilateral 11/5/2019    Procedure: Bilateral L3-5 MBB;  Surgeon: Fady Salmon MD;  Location: [facility] PAIN [facility]GT;  Service: Pain Management;  Laterality: Bilateral;    INJECTION OF ANESTHETIC AGENT AROUND MEDIAL BRANCH NERVES INNERVATING LUMBAR FACET JOINT Bilateral 5/5/2020    Procedure: Bilateral L3-5 MBB with steroid;  Surgeon: Fady Salmon MD;  [facility];  Service: Pain Management;  Laterality: Bilateral;    KNEE SURGERY      13     RECTAL SURGERY      TONSILLECTOMY       Imaging / Labs / Studies (reviewed on 7/15/2020):    Review of Systems:  CONSTITUTIONAL: patient denies any fever, chills, or weight loss  SKIN: patient denies any rash or itching  RESPIRATORY: patient denies having any shortness of breath  GASTROINTESTINAL: patient denies having  "any diarrhea, constipation, or bowel incontinence  GENITOURINARY: patient denies having any abnormal bladder function    MUSCULOSKELETAL:  - patient complains of the above noted pain/s (see chief pain complaint)    NEUROLOGICAL:   - pain as above  - strength in Lower extremities is decreased, on the RIGHT  - sensation in Lower extremities is abnormal, on the RIGHT  - patient denies any loss of bowel or bladder control      PSYCHIATRIC: patient denies any change in mood    Other:  All other systems reviewed and are negative    Physical Exam:  BP (!) 152/90   Pulse 98   Ht 5' 3" (1.6 m)   Wt 131 kg (288 lb 12.8 oz)   BMI 51.16 kg/m²  (reviewed on 7/15/2020)\  General:  Alert and oriented, No acute distress.    HEENT:       EOMI.  Normocephalic, atraumatic.   Cardiovascular:  Regular rate.    Gastrointestinal:  Soft.    Respiratory:  Respirations are non-labored.    Cervical Spine:  No masses or atrophy,  Thoracic Spine:  No masses or atrophy   Lumbar Spine:  No masses or atrophy, increased pain lumbar extension and left right lateral bending; minimal increase in pain lumbar flexion; tender palpation over bilateral lower lumbar facets    Sensory Exam:  Full and equal sensation to light touch throughout.    Reflexes   Left DTR's     Knee.   2  Ankle.   1  Right DTR's    Knee.   2  Ankle.   1  Neurologic:  Cranial Nerves II-XII are grossly intact.    Pathologic reflexes:            Clonus - Neg   Psychiatric:  Cooperative.    Gait:  Very antalgic; boot on right foot    Assessment  Lumbar Spondylosis  Lumbar Degenerative Disc Disease    1. 59 y.o. 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 Pain Generators / Etiology :  Lumbar degenerative disc disease, lumbar spondylosis  3. Failed Meds (E- Effective, NE- Not Effective) Lyrica-effective, naproxen-effective, Cymbalta-effective  4. Physical Therapy - complete physical therapy for greater than 6 weeks in the past  5. Psychological comorbidities -  anxiety  6. Anticoagulants / Antiplatelets:  None     PLAN:  1. Medications:  Will prescribe prescription for Medrol Dosepak  2. PT - continue physical therapy exercises as tolerated  3. Psychological - continue Klonopin for anxiety  4. Labs - obtain ESR and CRP; patient reports that these are to be drawn by Rheumatology in the coming months however they wanted her off of steroids for these labs therefore will draw these today prior to starting the Medrol Dosepak  5. Imaging - obtain  none;  6. Interventions - will schedule for bilateral lumbar radiofrequency ablation with the right side taking place 1st be followed by left side targeting the L4/5 and L5/S1 facet joints; she has had numerous lumbar medial branch blocks over the years with 100% symptomatic pain relief  7. Referrals - provide a referral to rheumatology per the patient's request to establish care  8. Records - none  9. Follow up visit - follow up in clinic 8 weeks after the procedures  10. Patient Questions - answered all patient's questions regarding diagnosis, therapy, treatment    LUPE Salmon MD  Interventional Pain  [facility] [place]

## 2020-07-15 NOTE — PROGRESS NOTES
Chief Pain Complaint:  Left Knee Pain    History of Present Illness:   Ms. Jurado returns to clinic for follow-up.  She underwent cervical spine epidural steroid injection C7-T1 on May 29, 2020 and reports 100% symptomatic pain relief.  Unfortunately her low back has started to give her trouble again which we have performed numerous lumbar medial branch blocks with steroid which has provided good 100% symptomatic pain relief for her for several months.  Today she rates her low back pain as an 8/10 which is worse with activity and somewhat improved with rest.  She and her fiance have purchased a house and therefore she is starting to pack and will be packing lots of boxes over the next few months in addition to getting  in October therefore she would like a more permanent fix for the low back.  She has been on prednisone in past however this has recently been tapered down by Rheumatology.  Medrol Dosepaks have helped her tremendously in the past.  Initial HPI:  Ms. Jurado returns to clinic for follow-up.  She underwent bilateral lumbar medial branch blocks yesterday with steroid however today she is here for a left intra-articular knee joint injection.  She has undergone intra-articular knee injections in the past however this caused increased pain so she is very nervous about having injection today.  She currently rates her pain as a 4/10 which is primarily located the medial lateral joint lines of the left knee.  She has arthritis in the knee and has had a knee replacement on the right side which has lead to further complications therefore she is trying to avoid knee replacement on the left if at all possible.  She denies having numbness or weakness in the left leg.  Her symptoms are improved with sitting and worse with standing and walking long distances.    Initial HPI:  This patient is a 59 y.o. female who presents today complaining of the above noted pain/s. The patient describes the pain as follows.  Ms. 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  She has a history of low back pain which has been present for many years.  [place] 1 [place] ago from [place] where she was seen by several different pain physicians including Dr. Micah Reyes and Dr. Salomon performed several injections in her lumbar spine which have provided 6 -9 months of pain relief with each injection. [place] she sought to establish care with Dr. Lara at [facility] however after her 1st visitation she decided to seek treatment elsewhere.  After she had the unfortunate incident with the dislocation of the knee replacement she was bed-bound for approximately 24 months which ultimately led to a lot of her increased low back pain. She denies having radiation into her lower extremities and denies having weakness in her legs however she does have numbness in the right lower extremity secondary to numerous surgeries on the foot and ankle.  She denies having difficulty with bowel bladder.  She has taken Lyrica in the past which has provided some benefit however insurance no longer covers this medication and she has been taking naproxen which provides some benefit.    Previous Therapy:  Medications:  Lyrica, naproxen, Cymbalta  Injections:  Numerous lumbar injections in the past, unsure if these are epidural steroid injections or medial branch blocks; lumbar medial branch blocks with steroid, C7/T1 interlaminar epidural steroid injection  Surgeries:  Numerous right lower extremity surgeries  Physical Therapy:  Has participated in physical therapy in the past    Past Surgical History:   Procedure  Laterality Date    BREAST SURGERY      breast lump removed    CARDIAC CATHETERIZATION Right 03/2018    wrist    CHOLECYSTECTOMY      ENDOSCOPIC ULTRASOUND OF UPPER GASTROINTESTINAL TRACT N/A 4/9/2019    Procedure: ULTRASOUND, UPPER GI TRACT, ENDOSCOPIC;  Surgeon: Demario Menjivar MD;  Location: [facility] ENDO;  Service: Endoscopy;  Laterality: N/A;    ENDOSCOPIC ULTRASOUND OF UPPER GASTROINTESTINAL TRACT N/A 5/28/2019    Procedure: ULTRASOUND, UPPER GI TRACT, ENDOSCOPIC;  Surgeon: Justin Munoz MD;  Location: [facility] ENDO;  Service: Endoscopy;  Laterality: N/A;    EPIDURAL STEROID INJECTION INTO CERVICAL SPINE N/A 6/17/2020    Procedure: C7/T1 IL JAGDISH;  Surgeon: Fady Salmon MD;  Location: [facility] PAIN MGT;  Service: Pain Management;  Laterality: N/A;    foot drop surgery      HYSTERECTOMY      INJECTION OF ANESTHETIC AGENT AROUND MEDIAL BRANCH NERVES INNERVATING LUMBAR FACET JOINT Bilateral 8/9/2019    Procedure: Block-nerve-medial branch-lumbar;  Surgeon: Fady Salmon MD;  Location: [facility] PAIN MGT;  Service: Pain Management;  Laterality: Bilateral;    INJECTION OF ANESTHETIC AGENT AROUND MEDIAL BRANCH NERVES INNERVATING LUMBAR FACET JOINT Bilateral 11/5/2019    Procedure: Bilateral L3-5 MBB;  Surgeon: Fady Salmon MD;  Location: [facility] PAIN [facility]GT;  Service: Pain Management;  Laterality: Bilateral;    INJECTION OF ANESTHETIC AGENT AROUND MEDIAL BRANCH NERVES INNERVATING LUMBAR FACET JOINT Bilateral 5/5/2020    Procedure: Bilateral L3-5 MBB with steroid;  Surgeon: Fady Salmon MD;  [facility];  Service: Pain Management;  Laterality: Bilateral;    KNEE SURGERY      13     RECTAL SURGERY      TONSILLECTOMY       Imaging / Labs / Studies (reviewed on 7/15/2020):    Review of Systems:  CONSTITUTIONAL: patient denies any fever, chills, or weight loss  SKIN: patient denies any rash or itching  RESPIRATORY: patient denies having any shortness of breath  GASTROINTESTINAL: patient denies having  "any diarrhea, constipation, or bowel incontinence  GENITOURINARY: patient denies having any abnormal bladder function    MUSCULOSKELETAL:  - patient complains of the above noted pain/s (see chief pain complaint)    NEUROLOGICAL:   - pain as above  - strength in Lower extremities is decreased, on the RIGHT  - sensation in Lower extremities is abnormal, on the RIGHT  - patient denies any loss of bowel or bladder control      PSYCHIATRIC: patient denies any change in mood    Other:  All other systems reviewed and are negative    Physical Exam:  BP (!) 152/90   Pulse 98   Ht 5' 3" (1.6 m)   Wt 131 kg (288 lb 12.8 oz)   BMI 51.16 kg/m²  (reviewed on 7/15/2020)\  General:  Alert and oriented, No acute distress.    HEENT:       EOMI.  Normocephalic, atraumatic.   Cardiovascular:  Regular rate.    Gastrointestinal:  Soft.    Respiratory:  Respirations are non-labored.    Cervical Spine:  No masses or atrophy,  Thoracic Spine:  No masses or atrophy   Lumbar Spine:  No masses or atrophy, increased pain lumbar extension and left right lateral bending; minimal increase in pain lumbar flexion; tender palpation over bilateral lower lumbar facets    Sensory Exam:  Full and equal sensation to light touch throughout.    Reflexes   Left DTR's     Knee.   2  Ankle.   1  Right DTR's    Knee.   2  Ankle.   1  Neurologic:  Cranial Nerves II-XII are grossly intact.    Pathologic reflexes:            Clonus - Neg   Psychiatric:  Cooperative.    Gait:  Very antalgic; boot on right foot    Assessment  Lumbar Spondylosis  Lumbar Degenerative Disc Disease    1. 59 y.o. year old patient with PMH of   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/2017    Pancreatitis 03/18/2019    [facility]    Recurrent UTI " 10/13/2017    Right bundle branch block     Right foot drop 10/13/2017    Urinary incontinence 10/13/2017    Weakness of right lower extremity 10/13/2017      presenting with pain located lumbar spine  2. Pain Generators / Etiology :  Lumbar degenerative disc disease, lumbar spondylosis  3. Failed Meds (E- Effective, NE- Not Effective) Lyrica-effective, naproxen-effective, Cymbalta-effective  4. Physical Therapy - complete physical therapy for greater than 6 weeks in the past  5. Psychological comorbidities -  anxiety  6. Anticoagulants / Antiplatelets:  None     PLAN:  1. Medications:  Will prescribe prescription for Medrol Dosepak  2. PT - continue physical therapy exercises as tolerated  3. Psychological - continue Klonopin for anxiety  4. Labs - obtain ESR and CRP; patient reports that these are to be drawn by Rheumatology in the coming months however they wanted her off of steroids for these labs therefore will draw these today prior to starting the Medrol Dosepak  5. Imaging - obtain  none;  6. Interventions - will schedule for bilateral lumbar radiofrequency ablation with the right side taking place 1st be followed by left side targeting the L4/5 and L5/S1 facet joints; she has had numerous lumbar medial branch blocks over the years with 100% symptomatic pain relief  7. Referrals - provide a referral to rheumatology per the patient's request to establish care  8. Records - none  9. Follow up visit - follow up in clinic 8 weeks after the procedures  10. Patient Questions - answered all patient's questions regarding diagnosis, therapy, treatment    LUPE Salmon MD  Interventional Pain  [facility] [place]

## 2020-07-15 NOTE — TELEPHONE ENCOUNTER
Lumbar RFA Injection scheduled. Pre injection instructions taught. Covid Orders entered.  7 DAYS prior to procedure: patient denies taking any medicaton  Stop taking Plavix/Clopridogrel/aspirin/Advil/Ibuprofen/Excedrin  STOP ALL SUPPLEMENT AND VITAMINS     Pt was able to verbalize all understanding. All questions answered.//dp

## 2020-07-15 NOTE — PATIENT INSTRUCTIONS
Will schedule ESR and CRP labs today prior to starting Medrol Dosepak  -provide prescription for Medrol Dosepak  -will schedule for right and then left lumbar radiofrequency ablation  -follow up patient follow up in clinic 8 weeks after the radiofrequency ablation is a complete

## 2020-07-16 ENCOUNTER — TELEPHONE (OUTPATIENT)
Dept: PAIN MEDICINE | Facility: CLINIC | Age: 60
End: 2020-07-16

## 2020-07-16 LAB — CRP SERPL-MCNC: 0.9 MG/L (ref 0–8.2)

## 2020-07-16 NOTE — TELEPHONE ENCOUNTER
----- Message from Malcolm Simon sent at 7/16/2020  1:41 PM CDT -----  Regarding: Test Results  Type:  Test Results    Who Called:  Pt   Name of Test (Lab/Mammo/Etc): labs   Date of Test:  07/15/2020  Ordering Provider:  rosa   Where the test was performed:  [facility]  Would the patient rather a call back or a response via [facility]? Call back   Best Call Back Number: [phone] ([place])   Additional Information:  n/a

## 2020-07-23 ENCOUNTER — TELEPHONE (OUTPATIENT)
Dept: PHARMACY | Facility: CLINIC | Age: 60
End: 2020-07-23

## 2020-07-23 NOTE — TELEPHONE ENCOUNTER
RX outgoing call regarding Rinvoq @ [facility]. Shipping out  for  arrival with patients consent. Copay of $0.00 @ 004. Address and  confirmed. Patient has 10 tablets on hand at this time. Patient has not started any new medications, has had no missed doses and no side effects present. Patient is currently taking the medication as directed by doctors instruction. Patient states they do not have any questions or concerns at this time

## 2020-07-29 DIAGNOSIS — M06.09 RHEUMATOID ARTHRITIS, SERONEGATIVE, MULTIPLE SITES: Primary | ICD-10-CM

## 2020-07-29 NOTE — TELEPHONE ENCOUNTER
----- Message from Shirley Larson sent at 7/29/2020 10:32 AM CDT -----  Contact: self [phone]  Requesting an RX refill or new RX.  Is this a refill or new RX:  new  RX name and strength: predniSONE (DELTASONE) 2.5 MG tablet  Directions (copy/paste from chart):  Take 1 tablet (2.5 mg total) by mouth once daily. - Oral  Is this a 30 day or 90 day RX:    Local pharmacy or mail order pharmacy:  local  Pharmacy name and phone # (copy/paste from chart):   [facility] [place] - [address] [phone] (Phone)  [phone] (Fax)  Comments:

## 2020-07-30 ENCOUNTER — TELEPHONE (OUTPATIENT)
Dept: INTERNAL MEDICINE | Facility: CLINIC | Age: 60
End: 2020-07-30

## 2020-07-30 RX ORDER — PREDNISONE 2.5 MG/1
2.5 TABLET ORAL DAILY
Qty: 30 TABLET | Refills: 0 | Status: SHIPPED | OUTPATIENT
Start: 2020-07-30 | End: 2020-11-06

## 2020-07-30 NOTE — TELEPHONE ENCOUNTER
----- Message from Leigha Justice sent at 7/30/2020  3:55 PM CDT -----  Contact: Self   Pt states that her step-son was expose to covid-19 and she had contact with him. Pt would like advice on being tested. Please advise.

## 2020-08-04 NOTE — PRE-PROCEDURE INSTRUCTIONS
Spoke with patient regarding procedure scheduled on 8/11  Arrival time 0600  Has patient been sick with fever or on antibiotics within the last 7 days? no  Has patient received a vaccination within the last 7 days? no  Has the patient stopped all medications as directed? Yes multivitamin and tumeric on 8/4.  Does patient have a pacemaker and or defibrillator? no  Does the patient have a ride to and from procedure and someone reliable to remain with patient? [facility] [phone]  Is the patient diabetic? no  Does the patient have sleep apnea? Or use O2 at home? no  Is the patient receiving sedation? yes  Is the patient instructed to remain NPO beginning at midnight the night before their procedure? yes  Procedure location confirmed with patient? yes    Covid denies signs or symptoms

## 2020-08-05 NOTE — PRE-PROCEDURE INSTRUCTIONS
BMI 51. Patient is scheduled for procedure with Dr. Salmon on 8/11. SCOTT Cooper in PAT notified and reviewing patients chart. SN to follow-up. Supervisor Nayana FERGUSON aware.

## 2020-08-07 NOTE — SIGNIFICANT EVENT
The patient is s 58 yo female scheduled for a Right lumbar RFA with sedation on 8/11/20 by Dr. Salmon at [facility]. Chart reviewed as requested by PAT nurse.   The patient has PMHx of HTN, Anxiety, Right foot drop, Fibromyalgia, Cervical/lumbar DDD, chronic pain, Rheumatoid arthritis, Neurogenic bladder, and Morbid obesity - BMI 51.   Due to morbid obesity, the patient is at risk for hypoventilation/apnea during procedural sedation. The patient did undergo Cervical JAGDISH on 6/17/20 without incident. It is recommended for the pt to have no procedural sedation. If procedural sedation is required, it is recommended to consult Respiratory therapy or Anesthesia.

## 2020-08-10 DIAGNOSIS — I12.9 HYPERTENSIVE CHRONIC KIDNEY DISEASE WITH STAGE 1 THROUGH STAGE 4 CHRONIC KIDNEY DISEASE, OR UNSPECIFIED CHRONIC KIDNEY DISEASE: ICD-10-CM

## 2020-08-10 RX ORDER — HYDROCHLOROTHIAZIDE 25 MG/1
25 TABLET ORAL DAILY
Qty: 90 TABLET | Refills: 3 | Status: SHIPPED | OUTPATIENT
Start: 2020-08-10 | End: 2021-08-10

## 2020-08-10 NOTE — TELEPHONE ENCOUNTER
----- Message from Rosmery Bautista sent at 8/10/2020  9:01 AM CDT -----  Contact: [phone]  Requesting an RX refill or new RX.  Is this a refill or new RX:  Refill 1  RX name and strength: hydroCHLOROthiazide (HYDRODIURIL) 25 MG tablet  Directions (copy/paste from chart):    Is this a 30 day or 90 day RX:    Local pharmacy or mail order pharmacy:  local pharmacy  Pharmacy name and phone # (copy/paste from chart):  [facility] [place] - [address] [phone] (Phone)  [phone] (Fax)       Comments:  Patient ran out of medication. Thank you

## 2020-08-11 ENCOUNTER — HOSPITAL ENCOUNTER (OUTPATIENT)
Facility: HOSPITAL | Age: 60
Discharge: HOME | End: 2020-08-11
Attending: PAIN MEDICINE | Admitting: PAIN MEDICINE
Payer: MEDICARE

## 2020-08-11 VITALS
DIASTOLIC BLOOD PRESSURE: 70 MMHG | WEIGHT: 283 LBS | BODY MASS INDEX: 50.14 KG/M2 | TEMPERATURE: 98 F | HEART RATE: 67 BPM | OXYGEN SATURATION: 100 % | SYSTOLIC BLOOD PRESSURE: 127 MMHG | RESPIRATION RATE: 18 BRPM | HEIGHT: 63 IN

## 2020-08-11 DIAGNOSIS — M47.816 LUMBAR SPONDYLOSIS: Primary | ICD-10-CM

## 2020-08-11 PROCEDURE — 63600175 PHARM REV CODE 636 W HCPCS: Mod: HCNC | Performed by: PAIN MEDICINE

## 2020-08-11 PROCEDURE — 27000221 HC OXYGEN, UP TO 24 HOURS: Mod: HCNC

## 2020-08-11 PROCEDURE — 64635 DESTROY LUMB/SAC FACET JNT: CPT | Mod: HCNC | Performed by: PAIN MEDICINE

## 2020-08-11 PROCEDURE — 99152 MOD SED SAME PHYS/QHP 5/>YRS: CPT | Mod: HCNC | Performed by: PAIN MEDICINE

## 2020-08-11 PROCEDURE — 64635 PR DESTROY LUMB/SAC FACET JNT: ICD-10-PCS | Mod: HCNC,RT,, | Performed by: PAIN MEDICINE

## 2020-08-11 PROCEDURE — S0020 INJECTION, BUPIVICAINE HYDRO: HCPCS | Mod: HCNC | Performed by: PAIN MEDICINE

## 2020-08-11 PROCEDURE — 64636 DESTROY L/S FACET JNT ADDL: CPT | Mod: HCNC | Performed by: PAIN MEDICINE

## 2020-08-11 PROCEDURE — 64636 DESTROY L/S FACET JNT ADDL: CPT | Mod: HCNC,RT,, | Performed by: PAIN MEDICINE

## 2020-08-11 PROCEDURE — 25000003 PHARM REV CODE 250: Mod: HCNC | Performed by: PAIN MEDICINE

## 2020-08-11 PROCEDURE — 64636 PR DESTROY L/S FACET JNT ADDL: ICD-10-PCS | Mod: HCNC,RT,, | Performed by: PAIN MEDICINE

## 2020-08-11 PROCEDURE — 64635 DESTROY LUMB/SAC FACET JNT: CPT | Mod: HCNC,RT,, | Performed by: PAIN MEDICINE

## 2020-08-11 RX ORDER — MIDAZOLAM HYDROCHLORIDE 1 MG/ML
INJECTION, SOLUTION INTRAMUSCULAR; INTRAVENOUS
Status: DISCONTINUED | OUTPATIENT
Start: 2020-08-11 | End: 2020-08-11 | Stop reason: HOSPADM

## 2020-08-11 RX ORDER — GUAIFENESIN AND PHENYLEPHRINE HCL 400; 10 MG/1; MG/1
500 TABLET ORAL 2 TIMES DAILY
Qty: 60 CAPSULE | Refills: 3 | COMMUNITY
Start: 2020-08-11 | End: 2020-09-15

## 2020-08-11 RX ORDER — BUPIVACAINE HYDROCHLORIDE 5 MG/ML
INJECTION, SOLUTION EPIDURAL; INTRACAUDAL
Status: DISCONTINUED | OUTPATIENT
Start: 2020-08-11 | End: 2020-08-11 | Stop reason: HOSPADM

## 2020-08-11 RX ORDER — FENTANYL CITRATE 50 UG/ML
INJECTION, SOLUTION INTRAMUSCULAR; INTRAVENOUS
Status: DISCONTINUED | OUTPATIENT
Start: 2020-08-11 | End: 2020-08-11 | Stop reason: HOSPADM

## 2020-08-11 RX ORDER — LIDOCAINE HYDROCHLORIDE 20 MG/ML
INJECTION, SOLUTION EPIDURAL; INFILTRATION; INTRACAUDAL; PERINEURAL
Status: DISCONTINUED | OUTPATIENT
Start: 2020-08-11 | End: 2020-08-11 | Stop reason: HOSPADM

## 2020-08-11 NOTE — OP NOTE
PROCEDURE: Lumbar medial branch radiofrequency (RF) ablation under fluoroscopic guidance    SIDE: right    LEVEL:   Sacral ala (Corresponding to the L5 dorsal ramus),   L5 transverse process (Corresponding to the L4 medial branch),   L4 transverse process (Corresponding to the L3 medial branch),     PROCEDURE DATE: 8/11/2020    PREOPERATIVE DIAGNOSIS: Lumbar spondylosis  POSTOPERATIVE DIAGNOSIS: Lumbar spondylosis    PROVIDER: LUPE Salmon MD  Assistant(s): None    ANESTHESIA: Local, IV Sedation    >> 1 mg of VERSED    >> 50 mcg of FENTANYL     INDICATION: The patient has low back pain without radiculopathy symptoms unresponsive to conservative treatments. Fluoroscopy was used to optimize visualization of needle placement and to maximize safety.        PROCEDURE DESCRIPTION / TECHNIQUE:   The patient was seen and identified in the preoperative area. Risks, benefits, complications, and alternatives were discussed with the patient. The patient agreed to proceed with the procedure and signed the consent. The site and side of the procedure was identified and marked. An iv was started.    The patient was taken to the procedural suite. The patient was positioned in prone orientation on procedure table and a pillow was placed under the abdomen to reduce lumbar lordosis. A time out was performed prior to any intervention. The procedure, site, side, and allergies were stated and agreed to by all present. The lumbosacral area was widely prepped with ChloraPrep. The procedural site was draped in usual sterile fashion. Vital signs were closely monitored throughout this procedure. Conscious sedation was used for this procedure to decrease patient anxiety.    Using AP fluoroscopy, the junction of the vetebral transverse process (TP) and the superior articular process (SAP) at the above noted levels was identified. The skin caudal and oblique to the SAP-TP junctions described above was marked. Superficial tissues were localized  with 2% PF Lidocaine at each level. Higher One 150 mm 20-gauge radiofrequency cannulae with curved 10-mm active tips were advanced to the above noted TP-SAP junction until osseus contact was made. The needle was walked off of the sulcus. The fluoroscope was obliqued to the ipsilateral side and the needles were repositioned as needed until the active tip was aligned along the base of the SAP and the needle tip met the anterior fluoroscopic shadow of the SAP. Lateral fluoroscopic imaging was captured and the needle tips were adjusted such that the tips extended into the SAP shadow, but not beyond the SAP silhouette into the intervertebral foramen. After satisfactory cannula positioning was realized in true lateral orientation, the needle stylets were removed, RF electrodes were introduced, and sensory and motor testing was performed.     Nerves were tested sequentially. Sensory testing was not performed at 50 Hz up to  0.5 volt with production of concordant pain. Motor testing was performed at 2 Hz up to 1.5 volts with elicitation of mulitifidus muscle contraction and with no radicular pain, paresthesias, or distal muscle contraction beyond the buttocks produced during motor testing. Following testing, RF electrodes were removed and 1 mL of 2% lidocaine and 1ml of 0.5% bupivacaine was injected through each cannula following negative aspiration. The RF electrodes were then replaced and each targeted medial branch was sequentially subjected to thermal coagulation at 80 degrees Celsius for 90 seconds. Following the burn, 1ml of 0.5% bupivacaine was injected, RF electrodes were removed, stylets were replaced, and each cannua was removed intact.    Description of Findings: Not applicable    Prosthetic devices, grafts, tissues, or devices implanted: None    Specimen Removed: No    Estimated Blood Loss: minimal    COMPLICATIONS: None    DISPOSITION / PLANS: The patient was transferred to the recovery area in a stable condition  for observation. The patient was reexamined prior to discharge. There was no evidence of acute neurologic injury following the procedure.  Patient was discharged from the recovery room after meeting discharge criteria. Home discharge instructions were given to the patient by the staff.

## 2020-08-11 NOTE — PLAN OF CARE
Pt aaa o x4, denies any pain/ discomfort, what to expect during recovery discussed with Pt and family at bedside. Pt and family verbalized understanding of post op instructions. All questions and concerns addressed, will continue to monitor until discharged.

## 2020-08-11 NOTE — DISCHARGE SUMMARY
[facility]  Short Stay  Discharge Summary    Admit Date: 8/11/2020    Discharge Date and Time: 8/11/2020  7:59 AM      Discharge Attending Physician: LUPE Salmon MD     Hospital Course (synopsis of major diagnoses, care, treatment, and services provided during the course of the hospital stay): Patient was admitted to Pre-op where informed consent was signed.  The patient was then taken to the procedure suite where the procedure was performed.  The patient was then return to the Pre-Op area and discharge was performed.     Final Diagnoses:    Principal Problem: <principal problem not specified>   Secondary Diagnoses:   Active Hospital Problems    Diagnosis  POA    Lumbar spondylosis [M47.816]  Yes      Resolved Hospital Problems   No resolved problems to display.       Discharged Condition: good    Disposition: Home or Self Care    Follow up/Patient Instructions:    Medications:  Reconciled Home Medications:      Medication List      CONTINUE taking these medications    baclofen 10 MG tablet  Commonly known as: LIORESAL  Take 1 tablet (10 mg total) by mouth nightly as needed.     benazepriL 20 MG tablet  Commonly known as: LOTENSIN  Take 1 tablet (20 mg total) by mouth once daily.     clonazePAM 1 MG tablet  Commonly known as: KLONOPIN  Take 1 tablet (1 mg total) by mouth 2 (two) times daily.     clotrimazole-betamethasone 1-0.05% cream  Commonly known as: LOTRISONE  Apply topically 2 (two) times daily as needed.     DULoxetine 60 MG capsule  Commonly known as: CYMBALTA  Take 1 capsule (60 mg total) by mouth once daily.     esomeprazole 40 MG capsule  Commonly known as: NEXIUM  Take 1 capsule (40 mg total) by mouth before breakfast.     fluconazole 200 MG Tab  Commonly known as: DIFLUCAN  Take 1 tablet (200 mg total) by mouth twice a week.     fluticasone propionate 50 mcg/actuation nasal spray  Commonly known as: FLONASE  2 sprays (100 mcg total) by Each Nostril route once daily.      hydroCHLOROthiazide 25 MG tablet  Commonly known as: HYDRODIURIL  Take 1 tablet (25 mg total) by mouth once daily.     hydrocortisone 2.5 % rectal cream  Commonly known as: PROCTOSOL HC  Place rectally 2 (two) times daily as needed for Hemorrhoids.     LINZESS 72 mcg Cap capsule  Generic drug: linaCLOtide  Take 1 capsule by mouth once daily.     LYRICA 75 MG capsule  Generic drug: pregabalin  TAKE 1 CAPSULE BY MOUTH 2 TIMES A DAY     metroNIDAZOLE 0.75 % gel  Commonly known as: METROGEL  AAA of face bid     mupirocin 2 % ointment  Commonly known as: BACTROBAN  Apply topically 2 (two) times daily.     nitrofurantoin (macrocrystal-monohydrate) 100 MG capsule  Commonly known as: MACROBID  Take 1 capsule (100 mg total) by mouth every evening.     nystatin powder  Commonly known as: MYCOSTATIN  Apply topically 2 (two) times daily.     nystatin-triamcinolone cream  Commonly known as: MYCOLOG II  Apply topically 2 (two) times daily.     polyethylene glycol 17 gram Pwpk  Commonly known as: GLYCOLAX  Take 17 g by mouth.     predniSONE 2.5 MG tablet  Commonly known as: DELTASONE  Take 1 tablet (2.5 mg total) by mouth once daily.     RINVOQ 15 mg 24 hr tablet  Generic drug: upadacitinib  Take 1 tablet (15 mg total) by mouth once daily.     sulfacetamide sodium (acne) 10 % Susp  APPLY 1 APPLICATION TOPICALLY EVERY DAY     tiZANidine 4 MG tablet  Commonly known as: ZANAFLEX  Take 2 tablets (8 mg total) by mouth every evening.     tolterodine 4 MG 24 hr capsule  Commonly known as: DETROL LA  Take 1 capsule (4 mg total) by mouth every evening.     turmeric root extract 500 mg Cap  Take 500 mg by mouth 2 (two) times daily.     valACYclovir 1000 MG tablet  Commonly known as: VALTREX  Take 1 tablet (1,000 mg total) by mouth once daily.     vancomycin 500 mg injection  Commonly known as: VANCOCIN          Discharge Procedure Orders   Diet general     Call MD for:  severe uncontrolled pain     Call MD for:  difficulty breathing,  headache or visual disturbances     Call MD for:  redness, tenderness, or signs of infection (pain, swelling, redness, odor or green/yellow discharge around incision site)     Activity as tolerated

## 2020-08-11 NOTE — DISCHARGE INSTRUCTIONS

## 2020-08-13 ENCOUNTER — TELEPHONE (OUTPATIENT)
Dept: RHEUMATOLOGY | Facility: CLINIC | Age: 60
End: 2020-08-13

## 2020-08-13 DIAGNOSIS — M06.9 RHEUMATOID ARTHRITIS, INVOLVING UNSPECIFIED SITE, UNSPECIFIED RHEUMATOID FACTOR PRESENCE: Primary | ICD-10-CM

## 2020-08-19 ENCOUNTER — PATIENT OUTREACH (OUTPATIENT)
Dept: ADMINISTRATIVE | Facility: OTHER | Age: 60
End: 2020-08-19

## 2020-08-21 ENCOUNTER — TELEPHONE (OUTPATIENT)
Dept: PHARMACY | Facility: CLINIC | Age: 60
End: 2020-08-21

## 2020-08-24 DIAGNOSIS — G56.01 CARPAL TUNNEL SYNDROME OF RIGHT WRIST: ICD-10-CM

## 2020-08-24 RX ORDER — PREGABALIN 75 MG/1
75 CAPSULE ORAL 2 TIMES DAILY
Qty: 60 CAPSULE | Refills: 11 | Status: SHIPPED | OUTPATIENT
Start: 2020-08-24 | End: 2021-08-24

## 2020-08-24 NOTE — TELEPHONE ENCOUNTER
----- Message from Rupali Retana sent at 8/24/2020  9:04 AM CDT -----  Regarding: refill  Contact: patient  Type:  RX Refill Request    Who Called: patient  Refill or New Rx:refill  RX Name and Strength:Lyrica, 75mg  How is the patient currently taking it? (ex. 1XDay):2 times daily  Is this a 30 day or 90 day RX:30  Preferred Pharmacy with phone number:[facility]  Local or Mail Order:local  Ordering Provider:Dr Singh  Would the patient rather a call back or a response via [facility]? call  Best Call Back Number:[phone]   Additional Information:

## 2020-09-01 ENCOUNTER — TELEPHONE (OUTPATIENT)
Dept: PHARMACY | Facility: CLINIC | Age: 60
End: 2020-09-01

## 2020-09-04 ENCOUNTER — DOCUMENTATION ONLY (OUTPATIENT)
Dept: PREADMISSION TESTING | Facility: HOSPITAL | Age: 60
End: 2020-09-04

## 2020-09-04 NOTE — PRE-PROCEDURE INSTRUCTIONS
Spoke with patient regarding procedure scheduled on 9/8. Rescheduled to 9/16 due to transportation.    Arrival time 0600    Has patient been sick with fever or on antibiotics within the last 7 days? No    Has patient received a vaccination within the last 7 days? no    Has the patient stopped all medications as directed? Denies blood thinners, vitamins, supplements and other NSAIDS.    Does patient have a pacemaker and or defibrillator? no    Does the patient have a ride to and from procedure and someone reliable to remain with patient? Friend Clinton will provide number on arrival.    Is the patient diabetic? no    Does the patient have sleep apnea? Or use O2 at home? No and no     Is the patient receiving sedation? yes    Is the patient instructed to remain NPO beginning at midnight the night before their procedure? yes    Procedure location confirmed with patient? Yes    Covid- Denies signs/symptoms. Instructed to notify PAT/MD if any changes.

## 2020-09-04 NOTE — SIGNIFICANT EVENT
The patient is s 60 yo female scheduled for a Left lumbar RFA with sedation on 9/8/20 by Dr. Salmon at [facility]. Chart reviewed as requested by PAT nurse.   The patient has PMHx of HTN, Anxiety, Right foot drop, Fibromyalgia, Cervical/lumbar DDD, chronic pain, Rheumatoid arthritis, Neurogenic bladder, and Morbid obesity - BMI 51.   Due to morbid obesity, the patient is at risk for hypoventilation/apnea during procedural sedation. The patient did undergo Cervical JAGDISH on 6/17/20 and Right lumbar RFA 8/11/20 without incident. It is recommended for the pt to have no procedural sedation. If procedural sedation is required, it is recommended to consult Respiratory therapy or Anesthesia.   
Statement Selected

## 2020-09-04 NOTE — PRE-PROCEDURE INSTRUCTIONS
BMI 50.1. Chart review and note completed by MATTHEW Fox NP on 9/4/2020.   Per note, It is recommended for the pt to have no procedural sedation. If procedural sedation is required, it is recommended to consult Respiratory therapy or Anesthesia.     Instructions written on PAT green sheet. Nayana supervisor aware.

## 2020-09-15 ENCOUNTER — OFFICE VISIT (OUTPATIENT)
Dept: INTERNAL MEDICINE | Facility: CLINIC | Age: 60
End: 2020-09-15
Payer: COMMERCIAL

## 2020-09-15 VITALS
BODY MASS INDEX: 52.1 KG/M2 | SYSTOLIC BLOOD PRESSURE: 132 MMHG | DIASTOLIC BLOOD PRESSURE: 78 MMHG | WEIGHT: 293 LBS | TEMPERATURE: 98 F

## 2020-09-15 DIAGNOSIS — N18.30 ANEMIA IN STAGE 3 CHRONIC KIDNEY DISEASE: ICD-10-CM

## 2020-09-15 DIAGNOSIS — M79.7 FIBROMYALGIA: ICD-10-CM

## 2020-09-15 DIAGNOSIS — L71.9 ROSACEA: ICD-10-CM

## 2020-09-15 DIAGNOSIS — F33.1 MAJOR DEPRESSIVE DISORDER, RECURRENT EPISODE, MODERATE: Primary | ICD-10-CM

## 2020-09-15 DIAGNOSIS — L72.3 SEBACEOUS CYST: ICD-10-CM

## 2020-09-15 DIAGNOSIS — F41.9 ANXIETY: ICD-10-CM

## 2020-09-15 DIAGNOSIS — I10 HYPERTENSION, ESSENTIAL: ICD-10-CM

## 2020-09-15 DIAGNOSIS — B37.2 CANDIDAL DERMATITIS: ICD-10-CM

## 2020-09-15 DIAGNOSIS — M06.09 RHEUMATOID ARTHRITIS, SERONEGATIVE, MULTIPLE SITES: ICD-10-CM

## 2020-09-15 DIAGNOSIS — E66.01 MORBID OBESITY WITH BMI OF 50.0-59.9, ADULT: ICD-10-CM

## 2020-09-15 DIAGNOSIS — D63.1 ANEMIA IN STAGE 3 CHRONIC KIDNEY DISEASE: ICD-10-CM

## 2020-09-15 DIAGNOSIS — I12.9 HYPERTENSIVE CHRONIC KIDNEY DISEASE WITH STAGE 1 THROUGH STAGE 4 CHRONIC KIDNEY DISEASE, OR UNSPECIFIED CHRONIC KIDNEY DISEASE: ICD-10-CM

## 2020-09-15 PROCEDURE — 3078F DIAST BP <80 MM HG: CPT | Mod: CPTII,S$GLB,, | Performed by: FAMILY MEDICINE

## 2020-09-15 PROCEDURE — 3008F PR BODY MASS INDEX (BMI) DOCUMENTED: ICD-10-PCS | Mod: CPTII,S$GLB,, | Performed by: FAMILY MEDICINE

## 2020-09-15 PROCEDURE — 3075F SYST BP GE 130 - 139MM HG: CPT | Mod: CPTII,S$GLB,, | Performed by: FAMILY MEDICINE

## 2020-09-15 PROCEDURE — 3008F BODY MASS INDEX DOCD: CPT | Mod: CPTII,S$GLB,, | Performed by: FAMILY MEDICINE

## 2020-09-15 PROCEDURE — 99214 PR OFFICE/OUTPT VISIT, EST, LEVL IV, 30-39 MIN: ICD-10-PCS | Mod: S$GLB,,, | Performed by: FAMILY MEDICINE

## 2020-09-15 PROCEDURE — 99214 OFFICE O/P EST MOD 30 MIN: CPT | Mod: S$GLB,,, | Performed by: FAMILY MEDICINE

## 2020-09-15 PROCEDURE — 99999 PR PBB SHADOW E&M-EST. PATIENT-LVL IV: CPT | Mod: PBBFAC,,, | Performed by: FAMILY MEDICINE

## 2020-09-15 PROCEDURE — 99499 UNLISTED E&M SERVICE: CPT | Mod: S$GLB,,, | Performed by: FAMILY MEDICINE

## 2020-09-15 PROCEDURE — 99999 PR PBB SHADOW E&M-EST. PATIENT-LVL IV: ICD-10-PCS | Mod: PBBFAC,,, | Performed by: FAMILY MEDICINE

## 2020-09-15 PROCEDURE — 3075F PR MOST RECENT SYSTOLIC BLOOD PRESS GE 130-139MM HG: ICD-10-PCS | Mod: CPTII,S$GLB,, | Performed by: FAMILY MEDICINE

## 2020-09-15 PROCEDURE — 3078F PR MOST RECENT DIASTOLIC BLOOD PRESSURE < 80 MM HG: ICD-10-PCS | Mod: CPTII,S$GLB,, | Performed by: FAMILY MEDICINE

## 2020-09-15 PROCEDURE — 99499 RISK ADDL DX/OHS AUDIT: ICD-10-PCS | Mod: S$GLB,,, | Performed by: FAMILY MEDICINE

## 2020-09-15 RX ORDER — DULOXETIN HYDROCHLORIDE 30 MG/1
30 CAPSULE, DELAYED RELEASE ORAL DAILY
COMMUNITY

## 2020-09-15 RX ORDER — CLONAZEPAM 1 MG/1
1 TABLET ORAL 2 TIMES DAILY
Qty: 60 TABLET | Refills: 0 | Status: SHIPPED | OUTPATIENT
Start: 2020-09-15

## 2020-09-15 NOTE — PROGRESS NOTES
Subjective:   Patient ID: Rivka Jurado is a 60 y.o. female.  Chief Complaint:  Annual Exam and Medication Refill      Patient presents for follow-up on chronic medical conditions.      Medical history for:   Hypertension with CKD 3. Controlled Lotensin 20 mg daily and HCTZ 25 mg daily.  Reports compliance.  Denies side effects.  No skin shortness of breath or worsening swelling.   Recent BMP with stable creatinine/egfr  Anemia from CKD 3.  Recent CBC with stable hemoglobin/hematocrit  GERD.  Stable on Nexium 40 mg daily.    Rheumatoid arthritis/ fibromyalgia.  Followed by Rheumatology.  With recent medication changes has remained overall stable and somewhat clinically improved.    Depression /Anxiety.  Cymbalta 30 mg daily.  Klonopin 1 mg twice a day  As needed.  Since last visit and probably related to COVID stretches and other family issues has started to need twice a day on a more regular basis.  Chronic pain with cervical and lumbar sacral issues.  Followed by interventional pain management.  Allergic rhinitis.  Reasonably controlled on Flonase.  Chronic  issues.  Stable on present regimen of Detrol LA and Macrobid.      Main complaint/concern today is related to her skin.    Reports overall poorly controlled rosacea, recurrent candidal dermatitis, and enlarging cyst on back.    Previous dermatology referral not completed due to COVID-19.    Request repeat referral.    Health maintenance needs include shingles and flu vaccines, mammogram, colon cancer screening, flu and shingles vaccines        Current Outpatient Medications:     benazepriL (LOTENSIN) 20 MG tablet, Take 1 tablet (20 mg total) by mouth once daily., Disp: 90 tablet, Rfl: 3    clonazePAM (KLONOPIN) 1 MG tablet, Take 1 tablet (1 mg total) by mouth 2 (two) times daily., Disp: 60 tablet, Rfl: 0    clotrimazole-betamethasone 1-0.05% (LOTRISONE) cream, Apply topically 2 (two) times daily as needed., Disp: 45 g, Rfl: 11    DULoxetine (CYMBALTA)  30 MG capsule, Take 30 mg by mouth once daily., Disp: , Rfl:     esomeprazole (NEXIUM) 40 MG capsule, Take 1 capsule (40 mg total) by mouth before breakfast., Disp: 90 capsule, Rfl: 3    fluconazole (DIFLUCAN) 200 MG Tab, Take 1 tablet (200 mg total) by mouth twice a week., Disp: 24 tablet, Rfl: 3    fluticasone propionate (FLONASE) 50 mcg/actuation nasal spray, 2 sprays (100 mcg total) by Each Nostril route once daily., Disp: 48 g, Rfl: 3    hydroCHLOROthiazide (HYDRODIURIL) 25 MG tablet, Take 1 tablet (25 mg total) by mouth once daily., Disp: 90 tablet, Rfl: 3    hydrocortisone (PROCTOSOL HC) 2.5 % rectal cream, Place rectally 2 (two) times daily as needed for Hemorrhoids., Disp: 28 g, Rfl: 11    metroNIDAZOLE (METROGEL) 0.75 % gel, AAA of face bid, Disp: 45 g, Rfl: 3    mupirocin (BACTROBAN) 2 % ointment, Apply topically 2 (two) times daily., Disp: 30 g, Rfl: 0    nitrofurantoin, macrocrystal-monohydrate, (MACROBID) 100 MG capsule, Take 1 capsule (100 mg total) by mouth every evening., Disp: 90 capsule, Rfl: 3    nystatin (MYCOSTATIN) powder, Apply topically 2 (two) times daily., Disp: 60 g, Rfl: 11    nystatin-triamcinolone (MYCOLOG II) cream, Apply topically 2 (two) times daily., Disp: 60 g, Rfl: 3    predniSONE (DELTASONE) 2.5 MG tablet, Take 1 tablet (2.5 mg total) by mouth once daily., Disp: 30 tablet, Rfl: 0    pregabalin (LYRICA) 75 MG capsule, Take 1 capsule (75 mg total) by mouth 2 (two) times daily., Disp: 60 capsule, Rfl: 11    sulfacetamide sodium, acne, 10 % Susp, APPLY 1 APPLICATION TOPICALLY EVERY DAY, Disp: 118 mL, Rfl: 3    tiZANidine (ZANAFLEX) 4 MG tablet, Take 2 tablets (8 mg total) by mouth every evening., Disp: 180 tablet, Rfl: 3    tolterodine (DETROL LA) 4 MG 24 hr capsule, Take 1 capsule (4 mg total) by mouth every evening., Disp: 90 capsule, Rfl: 3    upadacitinib (RINVOQ) 15 mg ER 24 hr tablet, Take 1 tablet (15 mg total) by mouth once daily., Disp: 30 tablet, Rfl: 3     valACYclovir (VALTREX) 1000 MG tablet, Take 1 tablet (1,000 mg total) by mouth once daily., Disp: 90 tablet, Rfl: 3    Review of Systems   Constitutional: Negative for activity change, appetite change, chills, fatigue and fever.   HENT: Negative for congestion, dental problem, ear pain, postnasal drip, rhinorrhea, sinus pressure and sore throat.    Eyes: Negative for visual disturbance.   Respiratory: Negative for cough, chest tightness, shortness of breath and wheezing.    Cardiovascular: Negative for chest pain, palpitations and leg swelling.   Gastrointestinal: Negative for abdominal pain, blood in stool, constipation, diarrhea, nausea and vomiting.   Endocrine: Negative for polydipsia, polyphagia and polyuria.   Genitourinary: Negative for difficulty urinating, dysuria, flank pain, hematuria and pelvic pain.   Musculoskeletal: Positive for arthralgias, back pain, gait problem, joint swelling, myalgias and neck stiffness. Negative for neck pain.   Skin: Positive for color change and rash. Negative for wound.   Neurological: Positive for weakness. Negative for dizziness, tremors, seizures, syncope, facial asymmetry, speech difficulty, light-headedness, numbness and headaches.   Hematological: Negative for adenopathy. Bruises/bleeds easily.   Psychiatric/Behavioral: Positive for confusion, dysphoric mood and sleep disturbance. Negative for agitation, behavioral problems, decreased concentration, hallucinations, self-injury and suicidal ideas. The patient is nervous/anxious. The patient is not hyperactive.      Objective:   /78 (BP Location: Right arm, Patient Position: Sitting, BP Method: Large (Manual))   Temp 98 °F (36.7 °C) (Tympanic)   Wt 133.4 kg (294 lb 1.5 oz)   BMI 52.10 kg/m²     Physical Exam  Vitals signs and nursing note reviewed.   Constitutional:       General: She is not in acute distress.     Appearance: She is well-developed and normal weight. She is not ill-appearing or toxic-appearing.       Comments: Morbid obesity   HENT:      Head:      Comments:  Mild facial rosacea     Nose: Nose normal. No mucosal edema or rhinorrhea.   Eyes:      General: No scleral icterus.     Conjunctiva/sclera: Conjunctivae normal.      Right eye: Right conjunctiva is not injected.      Left eye: Left conjunctiva is not injected.   Neck:      Thyroid: No thyroid mass or thyromegaly.      Vascular: No carotid bruit or JVD.   Cardiovascular:      Rate and Rhythm: Normal rate and regular rhythm.      Pulses:           Radial pulses are 2+ on the right side and 2+ on the left side.      Heart sounds: Normal heart sounds. No murmur. No friction rub. No gallop.    Pulmonary:      Effort: Pulmonary effort is normal. No tachypnea, bradypnea or respiratory distress.      Breath sounds: Normal breath sounds. No wheezing, rhonchi or rales.   Abdominal:      General: There is no distension.      Palpations: Abdomen is soft.      Tenderness: There is no abdominal tenderness. There is no guarding or rebound.   Musculoskeletal:      Right lower leg: No edema.      Left lower leg: No edema.   Skin:     General: Skin is warm and dry.      Findings: No abrasion, bruising, ecchymosis or rash.      Comments:  [place] sized sebaceous cyst left upper back.    Central core/ head but no drainage  Not red or warm  Tender to palpation    Neurological:      Mental Status: She is alert.      Coordination: Coordination is intact.      Gait: Gait abnormal.   Psychiatric:         Attention and Perception: Attention normal. She is attentive.         Mood and Affect: Mood is anxious. Mood is not depressed or elated. Affect is not labile, blunt, flat, angry, tearful or inappropriate.         Speech: She is communicative. Speech is rapid and pressured and tangential. Speech is not delayed or slurred.         Behavior: Behavior normal. Behavior is not agitated, slowed, aggressive, withdrawn, hyperactive or combative. Behavior is cooperative.         Thought  Content: Thought content normal. Thought content is not paranoid or delusional. Thought content does not include homicidal or suicidal ideation.         Cognition and Memory: Cognition normal.         Judgment: Judgment normal.       Assessment:       ICD-10-CM ICD-9-CM   1. Major depressive disorder, recurrent episode, moderate  F33.1 296.32   2. Anxiety  F41.9 300.00   3. Hypertension, essential  I10 401.9   4. Hypertensive chronic kidney disease with stage 1 through stage 4 chronic kidney disease, or unspecified chronic kidney disease   I12.9 403.90   5. Anemia in stage 3 chronic kidney disease  N18.3 285.21    D63.1 585.3   6. Rheumatoid arthritis, seronegative, multiple sites  M06.09 714.0   7. Fibromyalgia  M79.7 729.1   8. Morbid obesity with BMI of 50.0-59.9, adult  E66.01 278.01    Z68.43 V85.43   9. Candidal dermatitis  B37.2 112.3   10. Sebaceous cyst  L72.3 706.2   11. Rosacea  L71.9 695.3     Plan:   Major depressive disorder, recurrent episode, moderate  Anxiety  -     clonazePAM (KLONOPIN) 1 MG tablet; Take 1 tablet (1 mg total) by mouth 2 (two) times daily.  Dispense: 60 tablet; Refill: 0.  No behaviors to suggest inappropriate use of prescribed medications.  [place] Board of Pharmacy Controlled Prescription Drug Monitoring database was queried and showed no activity to suggest abuse, diversion, or other inappropriate use of prescription medications.   No additional complaints concerns today.  Continue Cymbalta.    Continue Klonopin 1 mg twice a day as needed    Hypertension, essential  Hypertensive chronic kidney disease with stage 1 through stage 4 chronic kidney disease, or unspecified chronic kidney disease   Anemia in stage 3 chronic kidney disease  Controlled.  BP at goal.  Labs stable.    Continue Lotensin 20 mg daily  Continue HCTZ 25 mg daily     Rheumatoid arthritis, seronegative, multiple sites  Fibromyalgia  Continue per rheumatology.      Candidal dermatitis  Sebaceous cyst  Rosacea  -      Ambulatory referral/consult to Dermatology; Future; Expected date: 09/22/2020    Follow-up with all specialists as scheduled.    Return to clinic 6 months or sooner as needed.

## 2020-09-16 ENCOUNTER — HOSPITAL ENCOUNTER (OUTPATIENT)
Facility: HOSPITAL | Age: 60
Discharge: HOME | End: 2020-09-16
Attending: PAIN MEDICINE | Admitting: PAIN MEDICINE
Payer: COMMERCIAL

## 2020-09-16 VITALS
BODY MASS INDEX: 51.91 KG/M2 | HEART RATE: 71 BPM | HEIGHT: 63 IN | WEIGHT: 293 LBS | RESPIRATION RATE: 15 BRPM | TEMPERATURE: 98 F | SYSTOLIC BLOOD PRESSURE: 147 MMHG | DIASTOLIC BLOOD PRESSURE: 75 MMHG | OXYGEN SATURATION: 100 %

## 2020-09-16 DIAGNOSIS — M47.816 LUMBAR SPONDYLOSIS: Primary | ICD-10-CM

## 2020-09-16 PROCEDURE — 99152 MOD SED SAME PHYS/QHP 5/>YRS: CPT | Performed by: PAIN MEDICINE

## 2020-09-16 PROCEDURE — 64636 PR DESTROY L/S FACET JNT ADDL: ICD-10-PCS | Mod: LT,,, | Performed by: PAIN MEDICINE

## 2020-09-16 PROCEDURE — 64636 DESTROY L/S FACET JNT ADDL: CPT | Mod: LT,,, | Performed by: PAIN MEDICINE

## 2020-09-16 PROCEDURE — S0020 INJECTION, BUPIVICAINE HYDRO: HCPCS | Performed by: PAIN MEDICINE

## 2020-09-16 PROCEDURE — 25000003 PHARM REV CODE 250: Performed by: PAIN MEDICINE

## 2020-09-16 PROCEDURE — 64635 PR DESTROY LUMB/SAC FACET JNT: ICD-10-PCS | Mod: LT,,, | Performed by: PAIN MEDICINE

## 2020-09-16 PROCEDURE — 64635 DESTROY LUMB/SAC FACET JNT: CPT | Performed by: PAIN MEDICINE

## 2020-09-16 PROCEDURE — 63600175 PHARM REV CODE 636 W HCPCS: Performed by: PAIN MEDICINE

## 2020-09-16 PROCEDURE — 64636 DESTROY L/S FACET JNT ADDL: CPT | Performed by: PAIN MEDICINE

## 2020-09-16 PROCEDURE — 64635 DESTROY LUMB/SAC FACET JNT: CPT | Mod: LT,,, | Performed by: PAIN MEDICINE

## 2020-09-16 RX ORDER — MIDAZOLAM HYDROCHLORIDE 1 MG/ML
INJECTION, SOLUTION INTRAMUSCULAR; INTRAVENOUS
Status: DISCONTINUED | OUTPATIENT
Start: 2020-09-16 | End: 2020-09-16 | Stop reason: HOSPADM

## 2020-09-16 RX ORDER — BUPIVACAINE HYDROCHLORIDE 5 MG/ML
INJECTION, SOLUTION EPIDURAL; INTRACAUDAL
Status: DISCONTINUED | OUTPATIENT
Start: 2020-09-16 | End: 2020-09-16 | Stop reason: HOSPADM

## 2020-09-16 RX ORDER — LIDOCAINE HYDROCHLORIDE 20 MG/ML
INJECTION, SOLUTION EPIDURAL; INFILTRATION; INTRACAUDAL; PERINEURAL
Status: DISCONTINUED | OUTPATIENT
Start: 2020-09-16 | End: 2020-09-16 | Stop reason: HOSPADM

## 2020-09-16 RX ORDER — FENTANYL CITRATE 50 UG/ML
INJECTION, SOLUTION INTRAMUSCULAR; INTRAVENOUS
Status: DISCONTINUED | OUTPATIENT
Start: 2020-09-16 | End: 2020-09-16 | Stop reason: HOSPADM

## 2020-09-16 NOTE — H&P
Progress Notes  Fady Salmon MD (Physician)   Pain Medicine   7/15/2020  2:00 PM   Signed     Chief Pain Complaint:  Left Knee Pain     History of Present Illness:   Ms. Jurado returns to clinic for follow-up.  She underwent cervical spine epidural steroid injection C7-T1 on May 29, 2020 and reports 100% symptomatic pain relief.  Unfortunately her low back has started to give her trouble again which we have performed numerous lumbar medial branch blocks with steroid which has provided good 100% symptomatic pain relief for her for several months.  Today she rates her low back pain as an 8/10 which is worse with activity and somewhat improved with rest.  She and her fiance have purchased a house and therefore she is starting to pack and will be packing lots of boxes over the next few months in addition to getting  in October therefore she would like a more permanent fix for the low back.  She has been on prednisone in past however this has recently been tapered down by Rheumatology.  Medrol Dosepaks have helped her tremendously in the past.  Initial HPI:  Ms. Jurado returns to clinic for follow-up.  She underwent bilateral lumbar medial branch blocks yesterday with steroid however today she is here for a left intra-articular knee joint injection.  She has undergone intra-articular knee injections in the past however this caused increased pain so she is very nervous about having injection today.  She currently rates her pain as a 4/10 which is primarily located the medial lateral joint lines of the left knee.  She has arthritis in the knee and has had a knee replacement on the right side which has lead to further complications therefore she is trying to avoid knee replacement on the left if at all possible.  She denies having numbness or weakness in the left leg.  Her symptoms are improved with sitting and worse with standing and walking long distances.     Initial HPI:  This patient is a 59 y.o.  female who presents today complaining of the above noted pain/s. The patient describes the pain as follows. Ms. 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  She has a history of low back pain which has been present for many years.  [place] where she was seen by several different pain physicians including Dr. Micah Reyes and Dr. Salomon performed several injections in her lumbar spine which have provided 6 -9 months of pain relief with each injection. [place] she sought to establish care with Dr. Lara at [facility] however after her 1st visitation she decided to seek treatment elsewhere.  After she had the unfortunate incident with the dislocation of the knee replacement she was bed-bound for approximately 24 months which ultimately led to a lot of her increased low back pain. She denies having radiation into her lower extremities and denies having weakness in her legs however she does have numbness in the right lower extremity secondary to numerous surgeries on the foot and ankle.  She denies having difficulty with bowel bladder.  She has taken Lyrica in the past which has provided some benefit however insurance no longer covers this medication and she has been taking naproxen which provides some benefit.     Previous Therapy:  Medications:  Lyrica, naproxen, Cymbalta  Injections:  Numerous lumbar injections in the past, unsure if these are epidural steroid injections or medial branch blocks; lumbar medial branch blocks with steroid, C7/T1 interlaminar epidural steroid injection  Surgeries:  Numerous right lower extremity  surgeries  Physical Therapy:  Has participated in physical therapy in the past           Past Surgical History:   Procedure Laterality Date    BREAST SURGERY         breast lump removed    CARDIAC CATHETERIZATION Right 03/2018     wrist    CHOLECYSTECTOMY        ENDOSCOPIC ULTRASOUND OF UPPER GASTROINTESTINAL TRACT N/A 4/9/2019     Procedure: ULTRASOUND, UPPER GI TRACT, ENDOSCOPIC;  Surgeon: Demario Menjivar MD;  Location: [facility] ENDO;  Service: Endoscopy;  Laterality: N/A;    ENDOSCOPIC ULTRASOUND OF UPPER GASTROINTESTINAL TRACT N/A 5/28/2019     Procedure: ULTRASOUND, UPPER GI TRACT, ENDOSCOPIC;  Surgeon: Justin Munoz MD;  Location: [facility] ENDO;  Service: Endoscopy;  Laterality: N/A;    EPIDURAL STEROID INJECTION INTO CERVICAL SPINE N/A 6/17/2020     Procedure: C7/T1 IL JAGDISH;  Surgeon: Fady Salmon MD;  Location: [facility] PAIN MGT;  Service: Pain Management;  Laterality: N/A;    foot drop surgery        HYSTERECTOMY        INJECTION OF ANESTHETIC AGENT AROUND MEDIAL BRANCH NERVES INNERVATING LUMBAR FACET JOINT Bilateral 8/9/2019     Procedure: Block-nerve-medial branch-lumbar;  Surgeon: Fady Salmon MD;  Location: [facility] PAIN MGT;  Service: Pain Management;  Laterality: Bilateral;    INJECTION OF ANESTHETIC AGENT AROUND MEDIAL BRANCH NERVES INNERVATING LUMBAR FACET JOINT Bilateral 11/5/2019     Procedure: Bilateral L3-5 MBB;  Surgeon: Fady Salmon MD;  Location: [facility] PAIN MGT;  Service: Pain Management;  Laterality: Bilateral;    INJECTION OF ANESTHETIC AGENT AROUND MEDIAL BRANCH NERVES INNERVATING LUMBAR FACET JOINT Bilateral 5/5/2020     Procedure: Bilateral L3-5 MBB with steroid;  Surgeon: Fady Salmon MD;  Location: [facility] PAIN MGT;  Service: Pain Management;  Laterality: Bilateral;    KNEE SURGERY         13     RECTAL SURGERY        TONSILLECTOMY          Imaging / Labs / Studies (reviewed on 7/15/2020):     Review of Systems:  CONSTITUTIONAL: patient denies any fever, chills, or weight  "loss  SKIN: patient denies any rash or itching  RESPIRATORY: patient denies having any shortness of breath  GASTROINTESTINAL: patient denies having any diarrhea, constipation, or bowel incontinence  GENITOURINARY: patient denies having any abnormal bladder function     MUSCULOSKELETAL:  - patient complains of the above noted pain/s (see chief pain complaint)     NEUROLOGICAL:   - pain as above  - strength in Lower extremities is decreased, on the RIGHT  - sensation in Lower extremities is abnormal, on the RIGHT  - patient denies any loss of bowel or bladder control      PSYCHIATRIC: patient denies any change in mood     Other:  All other systems reviewed and are negative     Physical Exam:  BP (!) 152/90   Pulse 98   Ht 5' 3" (1.6 m)   Wt 131 kg (288 lb 12.8 oz)   BMI 51.16 kg/m²  (reviewed on 7/15/2020)\  General:  Alert and oriented, No acute distress.    HEENT:       EOMI.  Normocephalic, atraumatic.   Cardiovascular:  Regular rate.    Gastrointestinal:  Soft.    Respiratory:  Respirations are non-labored.    Cervical Spine:  No masses or atrophy,  Thoracic Spine:  No masses or atrophy   Lumbar Spine:  No masses or atrophy, increased pain lumbar extension and left right lateral bending; minimal increase in pain lumbar flexion; tender palpation over bilateral lower lumbar facets     Sensory Exam:  Full and equal sensation to light touch throughout.    Reflexes   Left DTR's     Knee.   2  Ankle.   1  Right DTR's    Knee.   2  Ankle.   1  Neurologic:  Cranial Nerves II-XII are grossly intact.    Pathologic reflexes:            Clonus - Neg   Psychiatric:  Cooperative.    Gait:  Very antalgic; boot on right foot     Assessment  Lumbar Spondylosis  Lumbar Degenerative Disc Disease     1. 59 y.o. year old patient with PMH of        Past Medical History:   Diagnosis Date    Anal fissure      Anxiety 10/13/2017    Candidal dermatitis 10/13/2017    Choledochocyst      Genital herpes simplex 10/13/2017    " Helicobacter pylori gastritis      Hypertension      Neuropathy of right foot 10/13/2017    Pancreatitis 03/18/2019     [facility]    Recurrent UTI 10/13/2017    Right bundle branch block      Right foot drop 10/13/2017    Urinary incontinence 10/13/2017    Weakness of right lower extremity 10/13/2017       presenting with pain located lumbar spine  2. Pain Generators / Etiology :  Lumbar degenerative disc disease, lumbar spondylosis  3. Failed Meds (E- Effective, NE- Not Effective) Lyrica-effective, naproxen-effective, Cymbalta-effective  4. Physical Therapy - complete physical therapy for greater than 6 weeks in the past  5. Psychological comorbidities -  anxiety  6. Anticoagulants / Antiplatelets:  None     PLAN:  1. Medications:  Will prescribe prescription for Medrol Dosepak  2. PT - continue physical therapy exercises as tolerated  3. Psychological - continue Klonopin for anxiety  4. Labs - obtain ESR and CRP; patient reports that these are to be drawn by Rheumatology in the coming months however they wanted her off of steroids for these labs therefore will draw these today prior to starting the Medrol Dosepak  5. Imaging - obtain  none;  6. Interventions -  left side RFA targeting the L4/5 and L5/S1 facet joints; she has had numerous lumbar medial branch blocks over the years with 100% symptomatic pain relief  7. Referrals - provide a referral to rheumatology per the patient's request to establish care  8. Records - none  9. Follow up visit - follow up in clinic 8 weeks after the procedures  10. Patient Questions - answered all patient's questions regarding diagnosis, therapy, treatment     LUPE Salmon MD  Interventional Pain  [facility] [place]

## 2020-09-16 NOTE — DISCHARGE SUMMARY
[facility]  Short Stay  Discharge Summary    Admit Date: 9/16/2020    Discharge Date and Time: 9/16/2020  7:48 AM      Discharge Attending Physician: LUPE Salmon MD     Hospital Course (synopsis of major diagnoses, care, treatment, and services provided during the course of the hospital stay): Patient was admitted to Pre-op where informed consent was signed.  The patient was then taken to the procedure suite where the procedure was performed.  The patient was then return to the Pre-Op area and discharge was performed.     Final Diagnoses:    Principal Problem: <principal problem not specified>   Secondary Diagnoses:   Active Hospital Problems    Diagnosis  POA    Lumbar spondylosis [M47.816]  Yes      Resolved Hospital Problems   No resolved problems to display.       Discharged Condition: good    Disposition: Home or Self Care    Follow up/Patient Instructions:    Medications:  Reconciled Home Medications:      Medication List      CONTINUE taking these medications    benazepriL 20 MG tablet  Commonly known as: LOTENSIN  Take 1 tablet (20 mg total) by mouth once daily.     clonazePAM 1 MG tablet  Commonly known as: KLONOPIN  Take 1 tablet (1 mg total) by mouth 2 (two) times daily.     clotrimazole-betamethasone 1-0.05% cream  Commonly known as: LOTRISONE  Apply topically 2 (two) times daily as needed.     DULoxetine 30 MG capsule  Commonly known as: CYMBALTA  Take 30 mg by mouth once daily.     esomeprazole 40 MG capsule  Commonly known as: NEXIUM  Take 1 capsule (40 mg total) by mouth before breakfast.     fluconazole 200 MG Tab  Commonly known as: DIFLUCAN  Take 1 tablet (200 mg total) by mouth twice a week.     fluticasone propionate 50 mcg/actuation nasal spray  Commonly known as: FLONASE  2 sprays (100 mcg total) by Each Nostril route once daily.     hydroCHLOROthiazide 25 MG tablet  Commonly known as: HYDRODIURIL  Take 1 tablet (25 mg total) by mouth once daily.     hydrocortisone 2.5 %  rectal cream  Commonly known as: PROCTOSOL HC  Place rectally 2 (two) times daily as needed for Hemorrhoids.     metroNIDAZOLE 0.75 % gel  Commonly known as: METROGEL  AAA of face bid     mupirocin 2 % ointment  Commonly known as: BACTROBAN  Apply topically 2 (two) times daily.     nitrofurantoin (macrocrystal-monohydrate) 100 MG capsule  Commonly known as: MACROBID  Take 1 capsule (100 mg total) by mouth every evening.     nystatin powder  Commonly known as: MYCOSTATIN  Apply topically 2 (two) times daily.     nystatin-triamcinolone cream  Commonly known as: MYCOLOG II  Apply topically 2 (two) times daily.     predniSONE 2.5 MG tablet  Commonly known as: DELTASONE  Take 1 tablet (2.5 mg total) by mouth once daily.     pregabalin 75 MG capsule  Commonly known as: LYRICA  Take 1 capsule (75 mg total) by mouth 2 (two) times daily.     RINVOQ 15 mg 24 hr tablet  Generic drug: upadacitinib  Take 1 tablet (15 mg total) by mouth once daily.     sulfacetamide sodium (acne) 10 % Susp  APPLY 1 APPLICATION TOPICALLY EVERY DAY     tiZANidine 4 MG tablet  Commonly known as: ZANAFLEX  Take 2 tablets (8 mg total) by mouth every evening.     tolterodine 4 MG 24 hr capsule  Commonly known as: DETROL LA  Take 1 capsule (4 mg total) by mouth every evening.     valACYclovir 1000 MG tablet  Commonly known as: VALTREX  Take 1 tablet (1,000 mg total) by mouth once daily.          Discharge Procedure Orders   Diet general     Call MD for:  severe uncontrolled pain     Call MD for:  difficulty breathing, headache or visual disturbances     Call MD for:  redness, tenderness, or signs of infection (pain, swelling, redness, odor or green/yellow discharge around incision site)     Activity as tolerated

## 2020-09-16 NOTE — OP NOTE
PROCEDURE: Lumbar medial branch radiofrequency (RF) ablation under fluoroscopic guidance    SIDE: left    LEVEL:   Sacral ala (Corresponding to the L5 dorsal ramus),   L5 transverse process (Corresponding to the L4 medial branch),   L4 transverse process (Corresponding to the L3 medial branch),     PROCEDURE DATE: 9/16/2020    PREOPERATIVE DIAGNOSIS: Lumbar spondylosis  POSTOPERATIVE DIAGNOSIS: Lumbar spondylosis    PROVIDER: LUPE Salmon MD  Assistant(s): None    ANESTHESIA: Local, IV Sedation    >> 1 mg of VERSED    >> 25 mcg of FENTANYL     INDICATION: The patient has low back pain without radiculopathy symptoms unresponsive to conservative treatments. Fluoroscopy was used to optimize visualization of needle placement and to maximize safety.        PROCEDURE DESCRIPTION / TECHNIQUE:   The patient was seen and identified in the preoperative area. Risks, benefits, complications, and alternatives were discussed with the patient. The patient agreed to proceed with the procedure and signed the consent. The site and side of the procedure was identified and marked. An iv was started.    The patient was taken to the procedural suite. The patient was positioned in prone orientation on procedure table and a pillow was placed under the abdomen to reduce lumbar lordosis. A time out was performed prior to any intervention. The procedure, site, side, and allergies were stated and agreed to by all present. The lumbosacral area was widely prepped with ChloraPrep. The procedural site was draped in usual sterile fashion. Vital signs were closely monitored throughout this procedure. Conscious sedation was used for this procedure to decrease patient anxiety.    Using AP fluoroscopy, the junction of the vetebral transverse process (TP) and the superior articular process (SAP) at the above noted levels was identified. The skin caudal and oblique to the SAP-TP junctions described above was marked. Superficial tissues were localized  with 2% PF Lidocaine at each level. SeatNinja 150 mm 20-gauge radiofrequency cannulae with curved 10-mm active tips were advanced to the above noted TP-SAP junction until osseus contact was made. The needle was walked off of the sulcus. The fluoroscope was obliqued to the ipsilateral side and the needles were repositioned as needed until the active tip was aligned along the base of the SAP and the needle tip met the anterior fluoroscopic shadow of the SAP. Lateral fluoroscopic imaging was captured and the needle tips were adjusted such that the tips extended into the SAP shadow, but not beyond the SAP silhouette into the intervertebral foramen. After satisfactory cannula positioning was realized in true lateral orientation, the needle stylets were removed, RF electrodes were introduced, and sensory and motor testing was performed.     Nerves were tested sequentially. Sensory testing was not performed at 50 Hz up to  0.5 volt with production of concordant pain. Motor testing was performed at 2 Hz up to 1.5 volts with elicitation of mulitifidus muscle contraction and with no radicular pain, paresthesias, or distal muscle contraction beyond the buttocks produced during motor testing. Following testing, RF electrodes were removed and 1 mL of 2% lidocaine and 1ml of 0.5% bupivacaine was injected through each cannula following negative aspiration. The RF electrodes were then replaced and each targeted medial branch was sequentially subjected to thermal coagulation at 80 degrees Celsius for 90 seconds. Following the burn, 1ml of 0.5% bupivacaine was injected, RF electrodes were removed, stylets were replaced, and each cannua was removed intact.    Description of Findings: Not applicable    Prosthetic devices, grafts, tissues, or devices implanted: None    Specimen Removed: No    Estimated Blood Loss: minimal    COMPLICATIONS: None    DISPOSITION / PLANS: The patient was transferred to the recovery area in a stable condition  for observation. The patient was reexamined prior to discharge. There was no evidence of acute neurologic injury following the procedure.  Patient was discharged from the recovery room after meeting discharge criteria. Home discharge instructions were given to the patient by the staff.

## 2020-09-16 NOTE — DISCHARGE INSTRUCTIONS

## 2020-09-21 ENCOUNTER — TELEPHONE (OUTPATIENT)
Dept: PAIN MEDICINE | Facility: CLINIC | Age: 60
End: 2020-09-21

## 2020-09-21 NOTE — TELEPHONE ENCOUNTER
Procedure canceled. Pt will call to r/s. Pt understood. All questions answered.   Naeem Powers MA  [facility]

## 2020-09-21 NOTE — TELEPHONE ENCOUNTER
----- Message from Violetta Seo sent at 9/21/2020  8:58 AM CDT -----  Contact: pt  Per pt she wants to cancel today's appt due to transportation issues, can be reached at [phone]///thxMW

## 2020-09-29 ENCOUNTER — EXTERNAL HOSPITAL ADMISSION (OUTPATIENT)
Dept: ADMINISTRATIVE | Facility: CLINIC | Age: 60
End: 2020-09-29

## 2020-09-29 ENCOUNTER — PATIENT OUTREACH (OUTPATIENT)
Dept: ADMINISTRATIVE | Facility: CLINIC | Age: 60
End: 2020-09-29

## 2020-09-29 RX ORDER — ONDANSETRON 4 MG/1
4 TABLET, ORALLY DISINTEGRATING ORAL EVERY 6 HOURS PRN
Qty: 20 TABLET | Refills: 0 | Status: SHIPPED | OUTPATIENT
Start: 2020-09-29

## 2020-09-29 NOTE — TELEPHONE ENCOUNTER
Stage 3 renal failure        C3 nurse attempted to contact patient for a [facility] post hospital discharge follow-up call. The patient declined call at this time.

## 2020-09-29 NOTE — TELEPHONE ENCOUNTER
Spoke with patient.    Sent prescription for Zofran to her pharmacy.  Okay/ please schedule her [facility] hospital discharge follow-up.

## 2020-10-02 NOTE — TELEPHONE ENCOUNTER
I can not figure out how to forward this to Dr. Williamson.  Can you help get this to the right place or get this filled for the patient?    Thanks   Kennedy

## 2020-10-06 ENCOUNTER — TELEPHONE (OUTPATIENT)
Dept: PHARMACY | Facility: CLINIC | Age: 60
End: 2020-10-06

## 2020-10-06 ENCOUNTER — TELEPHONE (OUTPATIENT)
Dept: RHEUMATOLOGY | Facility: CLINIC | Age: 60
End: 2020-10-06

## 2020-10-06 RX ORDER — UPADACITINIB 15 MG/1
15 TABLET, EXTENDED RELEASE ORAL DAILY
Qty: 30 TABLET | Refills: 3 | Status: SHIPPED | OUTPATIENT
Start: 2020-10-06 | End: 2020-11-06 | Stop reason: SDUPTHER

## 2020-10-06 NOTE — TELEPHONE ENCOUNTER
----- Message from Willie Olmedo MD sent at 10/6/2020 11:19 AM CDT -----  Regarding: Return to clinic visit and labs  This patient is due for labs to monitor for toxicity and return to clinic visit.  Request extended (40min) follow-up for Dr. IZABELA connell.  Thank you!

## 2020-10-12 ENCOUNTER — TELEPHONE (OUTPATIENT)
Dept: RHEUMATOLOGY | Facility: CLINIC | Age: 60
End: 2020-10-12

## 2020-10-14 ENCOUNTER — TELEPHONE (OUTPATIENT)
Dept: PHARMACY | Facility: CLINIC | Age: 60
End: 2020-10-14

## 2020-10-20 ENCOUNTER — TELEPHONE (OUTPATIENT)
Dept: PAIN MEDICINE | Facility: CLINIC | Age: 60
End: 2020-10-20

## 2020-10-20 ENCOUNTER — PATIENT OUTREACH (OUTPATIENT)
Dept: ADMINISTRATIVE | Facility: OTHER | Age: 60
End: 2020-10-20

## 2020-10-20 NOTE — TELEPHONE ENCOUNTER
Attempted to call patient to confirm appointment with Dr. Salmon tomorrow 10/21/2020. Left v/m for patient.

## 2020-10-21 ENCOUNTER — IMMUNIZATION (OUTPATIENT)
Dept: PHARMACY | Facility: CLINIC | Age: 60
End: 2020-10-21
Payer: COMMERCIAL

## 2020-10-21 ENCOUNTER — OFFICE VISIT (OUTPATIENT)
Dept: PAIN MEDICINE | Facility: CLINIC | Age: 60
End: 2020-10-21
Payer: COMMERCIAL

## 2020-10-21 VITALS
HEART RATE: 91 BPM | WEIGHT: 293 LBS | BODY MASS INDEX: 51.91 KG/M2 | DIASTOLIC BLOOD PRESSURE: 79 MMHG | HEIGHT: 63 IN | SYSTOLIC BLOOD PRESSURE: 152 MMHG

## 2020-10-21 DIAGNOSIS — M54.12 CERVICAL RADICULOPATHY: ICD-10-CM

## 2020-10-21 DIAGNOSIS — M47.816 LUMBAR SPONDYLOSIS: Primary | ICD-10-CM

## 2020-10-21 DIAGNOSIS — Z12.11 SPECIAL SCREENING FOR MALIGNANT NEOPLASM OF COLON: Primary | ICD-10-CM

## 2020-10-21 PROCEDURE — 3008F PR BODY MASS INDEX (BMI) DOCUMENTED: ICD-10-PCS | Mod: CPTII,S$GLB,, | Performed by: PAIN MEDICINE

## 2020-10-21 PROCEDURE — 99214 OFFICE O/P EST MOD 30 MIN: CPT | Mod: S$GLB,,, | Performed by: PAIN MEDICINE

## 2020-10-21 PROCEDURE — 3078F DIAST BP <80 MM HG: CPT | Mod: CPTII,S$GLB,, | Performed by: PAIN MEDICINE

## 2020-10-21 PROCEDURE — 99214 PR OFFICE/OUTPT VISIT, EST, LEVL IV, 30-39 MIN: ICD-10-PCS | Mod: S$GLB,,, | Performed by: PAIN MEDICINE

## 2020-10-21 PROCEDURE — 99999 PR PBB SHADOW E&M-EST. PATIENT-LVL III: CPT | Mod: PBBFAC,,, | Performed by: PAIN MEDICINE

## 2020-10-21 PROCEDURE — 3078F PR MOST RECENT DIASTOLIC BLOOD PRESSURE < 80 MM HG: ICD-10-PCS | Mod: CPTII,S$GLB,, | Performed by: PAIN MEDICINE

## 2020-10-21 PROCEDURE — 3077F PR MOST RECENT SYSTOLIC BLOOD PRESSURE >= 140 MM HG: ICD-10-PCS | Mod: CPTII,S$GLB,, | Performed by: PAIN MEDICINE

## 2020-10-21 PROCEDURE — 3008F BODY MASS INDEX DOCD: CPT | Mod: CPTII,S$GLB,, | Performed by: PAIN MEDICINE

## 2020-10-21 PROCEDURE — 99999 PR PBB SHADOW E&M-EST. PATIENT-LVL III: ICD-10-PCS | Mod: PBBFAC,,, | Performed by: PAIN MEDICINE

## 2020-10-21 PROCEDURE — 3077F SYST BP >= 140 MM HG: CPT | Mod: CPTII,S$GLB,, | Performed by: PAIN MEDICINE

## 2020-10-21 RX ORDER — HYDROCODONE BITARTRATE AND ACETAMINOPHEN 5; 325 MG/1; MG/1
1 TABLET ORAL EVERY 8 HOURS PRN
Qty: 90 TABLET | Refills: 0 | Status: SHIPPED | OUTPATIENT
Start: 2020-10-21 | End: 2020-11-20

## 2020-10-21 NOTE — PROGRESS NOTES
Chief Pain Complaint:  Left Knee Pain  Low back pain  Neck pain    History of Present Illness:   Ms. Jurado returns to clinic for follow-up.  She underwent bilateral lumbar radiofrequency ablation with the right side taking place in August in the left side in July.  She reports 100% symptomatic pain relief with both procedures however she has had a little bit of low lumbar pain on the right side.  This started when she was in a wheelchair at home and she was leaning forward to [place] an object off a counter and she began fall forward and caught herself on the wall with her hands and scoot back in her wheelchair.  After this have been she notes that she had severe increase in her right-sided low back pain.  Today she reports the right low back is a 5/10 with the left side of her low back is 2/10.  She also got  last weekend which cause lot of stress in muscle tension which he believes has contributed to an increase in her neck pain.  She has undergone cervical epidural steroid injection the past which she did find to be very helpful and she still has significant relief from that but there is a slight amount of pain has started to return.  Unfortunately she has also recently been hospitalized due to pancreatitis which is cause an increase in her pain.  She saw orthopedics on Monday due to severe pain left knee for which she received an injection and nose at some point she will need a left knee replacement.  She has some hydrocodone left over from a previous surgery and has taken a few over the last couple days which has been significantly helpful for her and she would like to continue the medication and possible for her severe pain.  Of note she does have stage 3 chronic kidney disease therefore she has to avoid anti-inflammatory medications.    Interval HPI:  Ms. Jurado returns to clinic for follow-up.  She underwent cervical spine epidural steroid injection C7-T1 on May 29, 2020 and reports 100% symptomatic  pain relief.  Unfortunately her low back has started to give her trouble again which we have performed numerous lumbar medial branch blocks with steroid which has provided good 100% symptomatic pain relief for her for several months.  Today she rates her low back pain as an 8/10 which is worse with activity and somewhat improved with rest.  She and her fiance have purchased a house and therefore she is starting to pack and will be packing lots of boxes over the next few months in addition to getting  in October therefore she would like a more permanent fix for the low back.  She has been on prednisone in past however this has recently been tapered down by Rheumatology.  Medrol Dosepaks have helped her tremendously in the past.  Initial HPI:  Ms. Jurado returns to clinic for follow-up.  She underwent bilateral lumbar medial branch blocks yesterday with steroid however today she is here for a left intra-articular knee joint injection.  She has undergone intra-articular knee injections in the past however this caused increased pain so she is very nervous about having injection today.  She currently rates her pain as a 4/10 which is primarily located the medial lateral joint lines of the left knee.  She has arthritis in the knee and has had a knee replacement on the right side which has lead to further complications therefore she is trying to avoid knee replacement on the left if at all possible.  She denies having numbness or weakness in the left leg.  Her symptoms are improved with sitting and worse with standing and walking long distances.    Initial HPI:  This patient is a 60 y.o. female who presents today complaining of the above noted pain/s. The patient describes the pain as follows. Ms. 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  She has a history of low back pain which has been present for many years.  [place] ago [place] where she was seen by several different pain physicians including Dr. Micah Reyes and Dr. Salomon performed several injections in her lumbar spine which have provided 6 -9 months of pain relief with each injection. [place] she sought to establish care with Dr. Lara at [facility] however after her 1st visitation she decided to seek treatment elsewhere.  After she had the unfortunate incident with the dislocation of the knee replacement she was bed-bound for approximately 24 months which ultimately led to a lot of her increased low back pain. She denies having radiation into her lower extremities and denies having weakness in her legs however she does have numbness in the right lower extremity secondary to numerous surgeries on the foot and ankle.  She denies having difficulty with bowel bladder.  She has taken Lyrica in the past which has provided some benefit however insurance no longer covers this medication and she has been taking naproxen which provides some benefit.    Previous Therapy:  Medications:  Lyrica, naproxen, Cymbalta, hydrocodone  Injections:  Numerous lumbar injections in the past, unsure if these are epidural steroid injections or medial branch blocks; lumbar medial branch blocks with steroid, C7/T1 interlaminar epidural steroid injection, lumbar L3-5 radiofrequency ablation August and September of 2020 with 100% symptomatic pain relief  Surgeries:  Numerous right lower extremity surgeries  Physical Therapy:  Has participated in physical therapy in the past        Past Surgical History:   Procedure Laterality Date    BREAST SURGERY      breast lump removed    CARDIAC CATHETERIZATION Right 03/2018    wrist     CHOLECYSTECTOMY      ENDOSCOPIC ULTRASOUND OF UPPER GASTROINTESTINAL TRACT N/A 4/9/2019    Procedure: ULTRASOUND, UPPER GI TRACT, ENDOSCOPIC;  Surgeon: Demario Menjivar MD;  Location: [facility] END[facility];  Service: Endoscopy;  Laterality: N/A;    ENDOSCOPIC ULTRASOUND OF UPPER GASTROINTESTINAL TRACT N/A 5/28/2019    Procedure: ULTRASOUND, UPPER GI TRACT, ENDOSCOPIC;  Surgeon: Justin Munoz MD;  Location: [facility] ENDO;  Service: Endoscopy;  Laterality: N/A;    EPIDURAL STEROID INJECTION INTO CERVICAL SPINE N/A 6/17/2020    Procedure: C7/T1 IL JAGDISH;  Surgeon: Fady Salmon MD;  Location: [facility] PAIN MGT;  Service: Pain Management;  Laterality: N/A;    foot drop surgery      HYSTERECTOMY      INJECTION OF ANESTHETIC AGENT AROUND MEDIAL BRANCH NERVES INNERVATING LUMBAR FACET JOINT Bilateral 8/9/2019    Procedure: Block-nerve-medial branch-lumbar;  Surgeon: Fady Salmon MD;  [facility];  Service: Pain Management;  Laterality: Bilateral;    INJECTION OF ANESTHETIC AGENT AROUND MEDIAL BRANCH NERVES INNERVATING LUMBAR FACET JOINT Bilateral 11/5/2019    Procedure: Bilateral L3-5 MBB;  Surgeon: Fady Salmon MD;  Location: [facility]IN [facility]GT;  Service: Pain Management;  Laterality: Bilateral;    INJECTION OF ANESTHETIC AGENT AROUND MEDIAL BRANCH NERVES INNERVATING LUMBAR FACET JOINT Bilateral 5/5/2020    Procedure: Bilateral L3-5 MBB with steroid;  Surgeon: Fady Salmon MD;  [facility];  Service: Pain Management;  Laterality: Bilateral;    KNEE SURGERY      13     RADIOFREQUENCY THERMOCOAGULATION Right 8/11/2020    Procedure: Right L3-5 Lumbar RFA;  Surgeon: Fady Salmon MD;  [facility];  Service: Pain Management;  Laterality: Right;    RADIOFREQUENCY THERMOCOAGULATION Left 9/16/2020    Procedure: Left L3-5 Lumbar RFA;  Surgeon: Fady Salmon MD;  [facility];  Service: Pain Management;  Laterality: Left;    RECTAL SURGERY      TONSILLECTOMY       Imaging /  "Labs / Studies (reviewed on 10/21/2020):    Review of Systems:  CONSTITUTIONAL: patient denies any fever, chills, or weight loss  SKIN: patient denies any rash or itching  RESPIRATORY: patient denies having any shortness of breath  GASTROINTESTINAL: patient denies having any diarrhea, constipation, or bowel incontinence  GENITOURINARY: patient denies having any abnormal bladder function    MUSCULOSKELETAL:  - patient complains of the above noted pain/s (see chief pain complaint)    NEUROLOGICAL:   - pain as above  - strength in Lower extremities is decreased, on the RIGHT  - sensation in Lower extremities is abnormal, on the RIGHT  - patient denies any loss of bowel or bladder control      PSYCHIATRIC: patient denies any change in mood    Other:  All other systems reviewed and are negative    Physical Exam:  BP (!) 152/79   Pulse 91   Ht 5' 3" (1.6 m)   Wt 133.4 kg (294 lb 1.5 oz)   BMI 52.10 kg/m²  (reviewed on 10/21/2020)\  General:  Alert and oriented, No acute distress.    HEENT:       EOMI.  Normocephalic, atraumatic.   Neck:  Supple  Cardiovascular:  Regular rate.    Gastrointestinal:  Soft.    Musculoskeletal:  Right knee surgical scar  Respiratory:  Respirations are non-labored.    Neurologic:  Cranial Nerves II-XII are grossly intact.    Psychiatric:  Cooperative.    Gait:  Very antalgic; boot on right foot    Assessment  Lumbar Spondylosis  Lumbar Degenerative Disc Disease    1. 60 y.o. 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    Recurrent UTI 10/13/2017    Right bundle branch block     Right foot drop 10/13/2017    Urinary incontinence 10/13/2017    Weakness of right lower extremity " 10/13/2017      presenting with pain located lumbar spine  2. Pain Generators / Etiology :  Lumbar degenerative disc disease, lumbar spondylosis  3. Failed Meds (E- Effective, NE- Not Effective) Lyrica-effective, naproxen-effective, Cymbalta-effective, hydrocodone-effective  4. Physical Therapy - complete physical therapy for greater than 6 weeks in the past  5. Psychological comorbidities -  anxiety  6. Anticoagulants / Antiplatelets:  None     PLAN:  1. Medications:  She has been using hydrocodone very sparingly which is left over from previous surgery; will therefore provide prescription of hydrocodone 5/325 mg tablets to be taken 3 times daily only as needed; patient has been very compliant with all procedures and request in the past; will have oral swab sample taken today in addition to pain contract signed; [place]  checked today  2. PT - continue physical therapy exercises as tolerated; she would like to revisit formal physical therapy in the future and she will notify clinic when she is ready to proceed  3. Psychological - continue Klonopin for anxiety  4. Labs - none  5. Imaging - obtain  none;  6. Interventions - ; none currently however patient would like to undergo a repeat epidural steroid injection the cervical spine in the future  7. Referrals - provide a referral to rheumatology per the patient's request to establish care  8. Records - none  9. Follow up visit - with patient notify clinic when follow-up is needed  10. Patient Questions - answered all patient's questions regarding diagnosis, therapy, treatment    LUPE Salmon MD  Interventional Pain  [facility] [place]    I have notified the patient today that I will be moving from the region and leaving the [facility] system towards the end of December.  The patient has been given the option to continue receiving treatment [facility] with [facility] or has been provided with a list of other Interventional Pain  providers in the area.  Prescriptions will be given prior to my last day, so that the patient will not have any gaps in their prescriptions.

## 2020-10-21 NOTE — PATIENT INSTRUCTIONS
-will provide prescription for hydrocodone 5/325 mg tablets to be taken 3 times daily only as needed  -continue therapy exercises as tolerated  -patient will follow up in clinic as needed

## 2020-10-22 NOTE — PROGRESS NOTES
THIS IS THE SECOND ATTEMPT TO CONTACT PATIENT FOR MAMMO. SCHEDULE PATIENT FOR MAMMO APPOINTMENT.  11/24/2020 @ 2 PM.

## 2020-11-04 ENCOUNTER — TELEPHONE (OUTPATIENT)
Dept: PAIN MEDICINE | Facility: CLINIC | Age: 60
End: 2020-11-04

## 2020-11-04 DIAGNOSIS — M47.816 LUMBAR SPONDYLOSIS: Primary | ICD-10-CM

## 2020-11-04 NOTE — TELEPHONE ENCOUNTER
----- Message from Fady Salmon MD sent at 11/4/2020  4:26 PM CST -----  Contact: Rivka  Joann!  ----- Message -----  From: Effie Alberto MA  Sent: 11/4/2020   9:05 AM CST  To: Fady Salmon MD    Pt want to see about an JAGDISH as her pain in her back has increased//dp  ----- Message -----  From: Fady Salmon MD  Sent: 11/4/2020   8:51 AM CST  To: Effie Alberto MA    Reach out to her and see what type of injection, we just did RFA for her in the last month or so.  ----- Message -----  From: Effie Alberto MA  Sent: 11/3/2020   9:38 AM CST  To: Fady Salmon MD    Please advise  ----- Message -----  From: Nannette Fontanez  Sent: 11/3/2020   8:02 AM CST  To: Viky Shrestha Staff    Pt called to schedule back injection. Would like an early morning appt. Please call back at [phone]. Thanks jh

## 2020-11-06 ENCOUNTER — OFFICE VISIT (OUTPATIENT)
Dept: RHEUMATOLOGY | Facility: CLINIC | Age: 60
End: 2020-11-06
Payer: COMMERCIAL

## 2020-11-06 VITALS
WEIGHT: 291 LBS | SYSTOLIC BLOOD PRESSURE: 146 MMHG | HEIGHT: 63 IN | BODY MASS INDEX: 51.56 KG/M2 | HEART RATE: 100 BPM | DIASTOLIC BLOOD PRESSURE: 78 MMHG

## 2020-11-06 DIAGNOSIS — M06.09 RHEUMATOID ARTHRITIS, SERONEGATIVE, MULTIPLE SITES: Primary | ICD-10-CM

## 2020-11-06 DIAGNOSIS — Z79.899 HIGH RISK MEDICATION USE: ICD-10-CM

## 2020-11-06 DIAGNOSIS — Z71.89 COUNSELING ON HEALTH PROMOTION AND DISEASE PREVENTION: ICD-10-CM

## 2020-11-06 PROCEDURE — 99215 OFFICE O/P EST HI 40 MIN: CPT | Mod: S$GLB,,, | Performed by: INTERNAL MEDICINE

## 2020-11-06 PROCEDURE — 99999 PR PBB SHADOW E&M-EST. PATIENT-LVL IV: ICD-10-PCS | Mod: PBBFAC,,, | Performed by: INTERNAL MEDICINE

## 2020-11-06 PROCEDURE — 99215 PR OFFICE/OUTPT VISIT, EST, LEVL V, 40-54 MIN: ICD-10-PCS | Mod: S$GLB,,, | Performed by: INTERNAL MEDICINE

## 2020-11-06 PROCEDURE — 3077F PR MOST RECENT SYSTOLIC BLOOD PRESSURE >= 140 MM HG: ICD-10-PCS | Mod: CPTII,S$GLB,, | Performed by: INTERNAL MEDICINE

## 2020-11-06 PROCEDURE — 99999 PR PBB SHADOW E&M-EST. PATIENT-LVL IV: CPT | Mod: PBBFAC,,, | Performed by: INTERNAL MEDICINE

## 2020-11-06 PROCEDURE — 3008F BODY MASS INDEX DOCD: CPT | Mod: CPTII,S$GLB,, | Performed by: INTERNAL MEDICINE

## 2020-11-06 PROCEDURE — 3078F DIAST BP <80 MM HG: CPT | Mod: CPTII,S$GLB,, | Performed by: INTERNAL MEDICINE

## 2020-11-06 PROCEDURE — 3008F PR BODY MASS INDEX (BMI) DOCUMENTED: ICD-10-PCS | Mod: CPTII,S$GLB,, | Performed by: INTERNAL MEDICINE

## 2020-11-06 PROCEDURE — 3077F SYST BP >= 140 MM HG: CPT | Mod: CPTII,S$GLB,, | Performed by: INTERNAL MEDICINE

## 2020-11-06 PROCEDURE — 3078F PR MOST RECENT DIASTOLIC BLOOD PRESSURE < 80 MM HG: ICD-10-PCS | Mod: CPTII,S$GLB,, | Performed by: INTERNAL MEDICINE

## 2020-11-06 RX ORDER — NAPROXEN 500 MG/1
TABLET ORAL
COMMUNITY
Start: 2020-10-30

## 2020-11-06 RX ORDER — MONTELUKAST SODIUM 10 MG/1
TABLET ORAL
COMMUNITY
Start: 2020-10-30

## 2020-11-06 RX ORDER — UPADACITINIB 15 MG/1
15 TABLET, EXTENDED RELEASE ORAL DAILY
Qty: 30 TABLET | Refills: 3 | Status: SHIPPED | OUTPATIENT
Start: 2020-11-06

## 2020-11-06 NOTE — PROGRESS NOTES
RHEUMATOLOGY OUTPATIENT CLINIC NOTE    11/6/2020    Attending Rheumatologist: Willie Olmedo  Primary Care Provider: Kennedy Singh MD   Physician Requesting Consultation: Aaareferral Self  No address on file  Chief Complaint/Reason For Consultation:  Rheumatoid Arthritis    Subjective:       HPI  Rivka Jurado is a 60 y.o. White female with history of seronegative arthritis comes for follow-up visit.    Today:  Last seen by Rheumatology on May.  Provided prednisone taper to discontinue and recommended to start Rinvoq.  Refers resolution of hand pain since on medication.  Does not have any more swelling of small joints of the hands.  Refers episodic locking/catching of certain fingers.  Main concern today is bilateral knee and lower back pain, with prominent mechanical features..  Following with pain management.  Denies fever, rash,  or GI complaints.    Review of Systems   Constitutional: Negative for chills, fever and malaise/fatigue.   Eyes: Negative for pain and redness.   Respiratory: Negative for cough, hemoptysis and shortness of breath.    Cardiovascular: Negative for chest pain and leg swelling.   Gastrointestinal: Negative for abdominal pain, blood in stool and melena.   Genitourinary: Negative for dysuria and hematuria.   Musculoskeletal: Positive for joint pain (Knees, mixed pattern with prominent mechanical features.). Negative for falls.   Skin: Negative for rash.   Neurological: Negative for tingling, focal weakness and weakness.   Psychiatric/Behavioral: Negative for memory loss. The patient does not have insomnia.      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    Pancreatitis 03/18/2019    [facility]    Recurrent UTI 10/13/2017    Right bundle branch block     Right foot drop 10/13/2017    Urinary incontinence 10/13/2017    Weakness of right lower extremity 10/13/2017     Past Surgical History:   Procedure Laterality Date    BREAST SURGERY      breast lump removed    CARDIAC CATHETERIZATION Right 03/2018    wrist    CHOLECYSTECTOMY      ENDOSCOPIC ULTRASOUND OF UPPER GASTROINTESTINAL TRACT N/A 4/9/2019    Procedure: ULTRASOUND, UPPER GI TRACT, ENDOSCOPIC;  Surgeon: Demario Menjivar MD;  Location: [facility] ENDO;  Service: Endoscopy;  Laterality: N/A;    ENDOSCOPIC ULTRASOUND OF UPPER GASTROINTESTINAL TRACT N/A 5/28/2019    Procedure: ULTRASOUND, UPPER GI TRACT, ENDOSCOPIC;  Surgeon: Justin Munoz MD;  Location: [facility] ENDO;  Service: Endoscopy;  Laterality: N/A;    EPIDURAL STEROID INJECTION INTO CERVICAL SPINE N/A 6/17/2020    Procedure: C7/T1 IL JAGDISH;  Surgeon: Fady Salmon MD;  Location: [facility] PAIN MGT;  Service: Pain Management;  Laterality: N/A;    foot drop surgery      HYSTERECTOMY      INJECTION OF ANESTHETIC AGENT AROUND MEDIAL BRANCH NERVES INNERVATING LUMBAR FACET JOINT Bilateral 8/9/2019    Procedure: Block-nerve-medial branch-lumbar;  Surgeon: Fady Salmon MD;  Location: [facility] PAIN MGT;  Service: Pain Management;  Laterality: Bilateral;    INJECTION OF ANESTHETIC AGENT AROUND MEDIAL BRANCH NERVES INNERVATING LUMBAR FACET JOINT Bilateral 11/5/2019    Procedure: Bilateral L3-5 MBB;  Surgeon: Fady Salmon MD;  Location: [facility] PAIN [facility]GT;  Service: Pain Management;  Laterality: Bilateral;    INJECTION OF ANESTHETIC AGENT AROUND MEDIAL BRANCH NERVES INNERVATING LUMBAR FACET JOINT Bilateral 5/5/2020    Procedure: Bilateral L3-5 MBB with steroid;  Surgeon: Fady Salmon MD;  Location: [facility] PAIN MGT;  Service: Pain Management;  Laterality: Bilateral;    KNEE SURGERY      13     RADIOFREQUENCY THERMOCOAGULATION Right 8/11/2020    Procedure: Right L3-5 Lumbar  RFA;  Surgeon: Fady Salmon MD;  Location: [facility] PAIN MGT;  Service: Pain Management;  Laterality: Right;    RADIOFREQUENCY THERMOCOAGULATION Left 9/16/2020    Procedure: Left L3-5 Lumbar RFA;  Surgeon: Fady Salmon MD;  Location: [facility] PAIN MGT;  Service: Pain Management;  Laterality: Left;    RECTAL SURGERY      TONSILLECTOMY       Family History   Problem Relation Age of Onset    Colon cancer Mother     Prostate cancer Father     Cancer Sister      Social History     Substance and Sexual Activity   Alcohol Use No     Social History     Tobacco Use   Smoking Status Never Smoker   Smokeless Tobacco Never Used     Social History     Substance and Sexual Activity   Drug Use Never       Current Outpatient Medications:     benazepriL (LOTENSIN) 20 MG tablet, Take 1 tablet (20 mg total) by mouth once daily., Disp: 90 tablet, Rfl: 3    clonazePAM (KLONOPIN) 1 MG tablet, Take 1 tablet (1 mg total) by mouth 2 (two) times daily., Disp: 60 tablet, Rfl: 0    clotrimazole-betamethasone 1-0.05% (LOTRISONE) cream, Apply topically 2 (two) times daily as needed., Disp: 45 g, Rfl: 11    DULoxetine (CYMBALTA) 30 MG capsule, Take 30 mg by mouth once daily., Disp: , Rfl:     esomeprazole (NEXIUM) 40 MG capsule, Take 1 capsule (40 mg total) by mouth before breakfast., Disp: 90 capsule, Rfl: 3    fluconazole (DIFLUCAN) 200 MG Tab, Take 1 tablet (200 mg total) by mouth twice a week. (Patient taking differently: Take 200 mg by mouth twice a week. PRN), Disp: 24 tablet, Rfl: 3    fluticasone propionate (FLONASE) 50 mcg/actuation nasal spray, 2 sprays (100 mcg total) by Each Nostril route once daily., Disp: 48 g, Rfl: 3    hydroCHLOROthiazide (HYDRODIURIL) 25 MG tablet, Take 1 tablet (25 mg total) by mouth once daily., Disp: 90 tablet, Rfl: 3    HYDROcodone-acetaminophen (NORCO) 5-325 mg per tablet, Take 1 tablet by mouth every 8 (eight) hours as needed for Pain., Disp: 90 tablet, Rfl: 0    hydrocortisone (PROCTOSOL  HC) 2.5 % rectal cream, Place rectally 2 (two) times daily as needed for Hemorrhoids., Disp: 28 g, Rfl: 11    metroNIDAZOLE (METROGEL) 0.75 % gel, AAA of face bid, Disp: 45 g, Rfl: 3    mupirocin (BACTROBAN) 2 % ointment, Apply topically 2 (two) times daily., Disp: 30 g, Rfl: 0    nystatin (MYCOSTATIN) powder, Apply topically 2 (two) times daily., Disp: 60 g, Rfl: 11    nystatin-triamcinolone (MYCOLOG II) cream, Apply topically 2 (two) times daily., Disp: 60 g, Rfl: 3    pregabalin (LYRICA) 75 MG capsule, Take 1 capsule (75 mg total) by mouth 2 (two) times daily., Disp: 60 capsule, Rfl: 11    sulfacetamide sodium, acne, 10 % Susp, APPLY 1 APPLICATION TOPICALLY EVERY DAY, Disp: 118 mL, Rfl: 3    tiZANidine (ZANAFLEX) 4 MG tablet, Take 2 tablets (8 mg total) by mouth every evening., Disp: 180 tablet, Rfl: 3    tolterodine (DETROL LA) 4 MG 24 hr capsule, Take 1 capsule (4 mg total) by mouth every evening., Disp: 90 capsule, Rfl: 3    upadacitinib (RINVOQ) 15 mg 24 hr tablet, Take 1 tablet (15 mg total) by mouth once daily., Disp: 30 tablet, Rfl: 3    valACYclovir (VALTREX) 1000 MG tablet, Take 1 tablet (1,000 mg total) by mouth once daily. (Patient taking differently: Take 1,000 mg by mouth once daily. PRN), Disp: 90 tablet, Rfl: 3    montelukast (SINGULAIR) 10 mg tablet, , Disp: , Rfl:     naproxen (NAPROSYN) 500 MG tablet, , Disp: , Rfl:     nitrofurantoin, macrocrystal-monohydrate, (MACROBID) 100 MG capsule, Take 1 capsule (100 mg total) by mouth every evening., Disp: 90 capsule, Rfl: 3    ondansetron (ZOFRAN-ODT) 4 MG TbDL, Take 1 tablet (4 mg total) by mouth every 6 (six) hours as needed (Nausea or Vomiting). (Patient not taking: Reported on 11/6/2020), Disp: 20 tablet, Rfl: 0     Objective:         Vitals:    11/06/20 1407   BP: (!) 146/78   Pulse: 100     Physical Exam   Constitutional: She is oriented to person, place, and time. No distress.   Estimated body mass index is 51.55 kg/m² as calculated  "from the following:    Height as of this encounter: 5' 3" (1.6 m).    Weight as of this encounter: 132 kg (291 lb 0.1 oz).    Wt Readings from Last 1 Encounters:  11/06/20 1407 : 132 kg (291 lb 0.1 oz)     HENT:   Head: Normocephalic and atraumatic.   Eyes: Conjunctivae are normal. Pupils are equal, round, and reactive to light.   Neck: Normal range of motion.   Cardiovascular: Normal rate and intact distal pulses.    Pulmonary/Chest: Effort normal. No respiratory distress.   Abdominal: Soft. She exhibits no distension.   Neurological: She is alert and oriented to person, place, and time.   Skin: No rash noted. No erythema.     Musculoskeletal: Normal range of motion. Tenderness (Knee joint lines.) present.      Comments: : strong  No synovitis or significant squeeze tenderness    AROM: intact  PROM: intact       Reviewed old and all outside pertinent medical records available.    All lab results personally reviewed and interpreted by me.  Lab Results   Component Value Date    WBC 8.41 05/19/2020    HGB 11.8 (L) 05/19/2020    HCT 36.7 (L) 05/19/2020    MCV 88 05/19/2020    MCH 28.2 05/19/2020    MCHC 32.2 05/19/2020    RDW 14.0 05/19/2020     05/19/2020    MPV 10.0 05/19/2020       Lab Results   Component Value Date     (L) 05/19/2020    K 3.9 05/19/2020    CL 97 05/19/2020    CO2 29 05/19/2020     05/19/2020    BUN 18 05/19/2020    CALCIUM 9.3 05/19/2020    PROT 7.4 05/19/2020    ALBUMIN 3.4 (L) 05/19/2020    BILITOT 0.3 05/19/2020    AST 15 05/19/2020    ALKPHOS 91 05/19/2020    ALT 9 (L) 05/19/2020       Lab Results   Component Value Date    COLORU Yellow 10/10/2017    APPEARANCEUA Clear 10/10/2017    SPECGRAV <=1.005 (A) 10/10/2017    PHUR 6.0 10/10/2017    PROTEINUA Negative 10/10/2017    KETONESU Negative 10/10/2017    LEUKOCYTESUR 1+ (A) 10/10/2017    NITRITE Negative 10/10/2017       Lab Results   Component Value Date    CRP 0.9 07/15/2020       Lab Results   Component Value Date    " SEDRATE 19 07/15/2020       Lab Results   Component Value Date    RF <10.0 03/05/2020    SEDRATE 19 07/15/2020       No components found for: 25OHVITDTOT, 55AEPVHZ5, 47TERJVQ8, METHODNOTE    No results found for: URICACID    No components found for: TSPOTTB    Rheum Labs:   TRINA negative   SSA negative    CCP pending.  Rheumatoid factor negative.    Infectious Labs:   HIV nonreactive October 2017   Hepatitis profile nonreactive March 2020    QuantiFERON TB negative May 2020    Imaging:  All imaging reviewed and independently interpreted by me.    X-ray hands March 2020  No acute fracture or dislocation.  No erosive changes are identified.  No significant degenerative changes.    MRI C-spine May 2020  Degenerative findings as above without high-grade spinal canal stenosis    MRI lumbar spine May 2020  Discogenic degenerative changes of the lumbar spine      ASSESSMENT / PLAN:     Rivka Jurado is a 60 y.o. White female with:    1. Rheumatoid arthritis, seronegative, multiple sites  - historical diagnosis based on inflammatory pattern pain, family history, and synovitis on exam  - documented for failing several DMARDs and biologics: Mtx/PLQ, -Enbrel -Remicade -Kineret, xeljanz  - initiated Rinvoq last visit, refers almost complete resolution of hand pain.  Does not have any more swelling.  - currently without synovitis on exam.  Main complaint mostly consistent with mechanical back and knee pain, for which follows with Pain Management.  - will continue therapy unchanged at this time.  Workup as below due repeated episodes of pancreatitis  - upadacitinib (RINVOQ) 15 mg 24 hr tablet; Take 1 tablet (15 mg total) by mouth once daily.  Dispense: 30 tablet; Refill: 3  - IgG 1, 2, 3, and 4; Standing  - Angiotensin Converting Enzyme; Standing    2. High risk medication use  - Compromised immune system secondary to autoimmune disease and use of immunosuppressive drugs.   - Monitor carefully for infections and toxicities.    - Advised to get immediate medical care if any infection.   - Also advised strict adherence to age appropriate vaccinations and cancer screenings with PCP.  - CBC Auto Differential; Standing  - Comprehensive Metabolic Panel; Standing    3. Other specified counseling  - over 10 minutes spent regarding below topics:  - Weight loss counseling done.  - Nutrition and exercise counseling.  - Limitation of alcohol consumption.  - Regular exercise:  Aerobic and resistance.  - Medication counseling provided.    Follow up in about 4 months (around 3/6/2021).    Method of contact with patient concerns: [facility]    Disclaimer:  This note is prepared using voice recognition software and as such is likely to have errors and has not been proof read. Please contact me for questions.     Time spent: 45 minutes in face to face discussion concerning diagnosis, prognosis, review of lab and test results, benefits of treatment as well as management of disease, counseling of patient and coordination of care between various health care providers.  Greater than half the time spent was used for coordination of care and counseling of patient.    Willie Olmedo M.D.  Rheumatology Department   [facility]

## 2020-11-06 NOTE — PATIENT INSTRUCTIONS
Treating Trigger Finger     The tendon sheath is opened to release the tendon. Once the tendon can move freely again, the finger can bend and straighten more normally.     Trigger finger occurs when the tissue inside your finger or thumb becomes inflamed. Mild cases can be treated without surgery. If the problem is severe, surgery may be needed. Your doctor will discuss your options with you.  Nonsurgical treatment  For mild symptoms, your doctor may have you rest the finger or thumb. You may also be told to take anti-inflammatory medicines. These include ibuprofen or aspirin. You may be given an injection of medicine in the base of the finger or thumb. This typically is a steroid, such as cortisone.  Surgery  If nonsurgical treatments dont ease your symptoms, surgery may be recommended. A tendon is a cordlike fiber that attaches muscle to bone and allows joints to bend. The tendon is surrounded by a protective cover called a sheath. During surgery, the sheath in your finger or thumb is opened to enlarge the space and release the swollen tendon. This allows the finger or thumb to bend and straighten normally. Surgery takes about 20 minutes. It can often be done using a local anesthetic. You may be able to go home the same day. Your hand will be wrapped in a soft bandage. You may need to wear a plaster splint for a short time to keep the finger or thumb still as it heals. The stitches will be removed in about 2 weeks. Your doctor can discuss the risks and benefits of surgery with you.  Date Last Reviewed: 9/21/2015 © 2000-2017 [employer]. [address] [place] [address]. All rights reserved. This information is not intended as a substitute for professional medical care. Always follow your healthcare professional's instructions.        What is Trigger Finger?  Trigger finger is an inflammation of tissue inside your finger or thumb. It is also called tenosynovitis (ten-oh-sin-oh-VY-tis).  Tendons (cordlike fibers that attach muscle to bone and allow you to bend the joints) become swollen. So does the synovium (a slick membrane that allows the tendons to move easily). This makes it difficult to straighten the finger or thumb.    Causes  Repeated use of a tool with strong gripping, such as a drill or wrench, can irritate and inflame the tendons and the synovium. It is also more common in certain medical conditions such as rheumatoid arthritis, gout, and diabetes. But often the cause of trigger finger is unknown.  Inside your finger  Tendons connect muscles in your forearm to the bones in your fingers. The tendons in each finger are surrounded by a protective tendon sheath. This sheath is lined with synovium, which produces a fluid that allows the tendons to slide easily when you bend and straighten the finger. If a tendon is irritated, it becomes inflamed.  When a tendon is inflamed  When a tendon is inflamed, it causes the lining of the tendon sheath to swell and thicken. Or the tendon itself may thicken. Then the sheath pinches the tendon, and the tendon can no longer slide easily inside the sheath. When you straighten your finger, the tendon sticks or locks as it tries to squeeze back through the sheath.    Symptoms  The first sign of trigger finger may be pain where the finger or thumb joins the palm. You may also notice some swelling. As the tendon becomes more inflamed, the finger may start to catch when you try to straighten it. When the locked tendon releases, the finger jumps, as if you were releasing the trigger of a gun. This further irritates the tendon, and may set up a cycle of catching and swelling.   Date Last Reviewed: 9/28/2015 © 2000-2017 "[employer]. [address] [place] [address], [place] [address]. All rights reserved. This information is not intended as a substitute for professional medical care. Always follow your healthcare professional's  instructions.        Diclofenac skin gel  What is this medicine?  DICLOFENAC (dye JOSH alexis ak) is a non-steroidal anti-inflammatory drug (NSAID). The 1% skin gel is used to treat osteoarthritis of the hands or knees. The 3% skin gel is used to treat actinic keratosis.  How should I use this medicine?  This medicine is for external use only. Follow the directions on the prescription label. Wash hands before and after use. Do not get this medicine in your eyes. If you do, rinse out with plenty of cool tap water. Use your doses at regular intervals. Do not use your medicine more often than directed.  A special MedGuide will be given to you by the pharmacist with each prescription and refill of the 1% gel. Be sure to read this information carefully each time.  Talk to your pediatrician regarding the use of this medicine in children. Special care may be needed. The 3% gel is not approved for use in children.  What side effects may I notice from receiving this medicine?  Side effects that you should report to your doctor or health care professional as soon as possible:  · allergic reactions like skin rash, itching or hives, swelling of the face, lips, or tongue  · black or bloody stools, blood in the urine or vomit  · blurred vision  · chest pain  · difficulty breathing or wheezing  · nausea or vomiting  · redness, blistering, peeling or loosening of the skin, including inside the mouth  · slurred speech or weakness on one side of the body  · trouble passing urine or change in the amount of urine  · unexplained weight gain or swelling  · unusually weak or tired  · yellowing of eyes or skin  Side effects that usually do not require medical attention (report to your doctor or health care professional if they continue or are bothersome):  · dizziness  · dry skin  · headache  · heartburn  · increased sensitivity to the sun  · stomach pain  · tingling at the application site  What may interact with this  medicine?  · aspirin  · NSAIDs, medicines for pain and inflammation, like ibuprofen or naproxen  Do not use any other skin products without telling your doctor or health care professional.  What if I miss a dose?  If you miss a dose, use it as soon as you can. If it is almost time for your next dose, use only that dose. Do not use double or extra doses.  Where should I keep my medicine?  Keep out of the reach of children.  Store the 1% gel at room temperature between 15 and 30 degrees C (59 and 86 degrees F). Store the 3% gel at room temperature between 20 and 25 degrees C (68 and 77 degrees F). Protect from light. Throw away any unused medicine after the expiration date.  What should I tell my health care provider before I take this medicine?  They need to know if you have any of these conditions:  · asthma  · bleeding problems  · coronary artery bypass graft (CABG) surgery within the past 2 weeks  · heart disease  · high blood pressure  · if you frequently drink alcohol containing drinks  · kidney disease  · liver disease  · open or infected skin  · stomach problems  · an unusual or allergic reaction to diclofenac, aspirin, other NSAIDs, other medicines, benzyl alcohol (3% gel only), foods, dyes, or preservatives  · pregnant or trying to get pregnant  · breast-feeding  What should I watch for while using this medicine?  Tell your doctor or healthcare professional if your symptoms do not start to get better or if they get worse. You will need to follow up with your health care provider to monitor your progress. You may need to be treated for up to 3 months if you are using the 3% gel, but the full effect may not occur until 1 month after stopping treatment. If you develop a severe skin reaction, contact your doctor or health care professional immediately.  This medicine can make you more sensitive to the sun. Keep out of the sun. If you cannot avoid being in the sun, wear protective clothing and use sunscreen. Do  not use sun lamps or tanning beds/booths.  Do not take medicines such as ibuprofen and naproxen with this medicine. Side effects such as stomach upset, nausea, or ulcers may be more likely to occur. Many medicines available without a prescription should not be taken with this medicine.  This medicine does not prevent heart attack or stroke. In fact, this medicine may increase the chance of a heart attack or stroke. The chance may increase with longer use of this medicine and in people who have heart disease. If you take aspirin to prevent heart attack or stroke, talk with your doctor or health care professional.  This medicine can cause ulcers and bleeding in the stomach and intestines at any time during treatment. Do not smoke cigarettes or drink alcohol. These increase irritation to your stomach and can make it more susceptible to damage from this medicine. Ulcers and bleeding can happen without warning symptoms and can cause death.  You may get drowsy or dizzy. Do not drive, use machinery, or do anything that needs mental alertness until you know how this medicine affects you. Do not stand or sit up quickly, especially if you are an older patient. This reduces the risk of dizzy or fainting spells.  This medicine can cause you to bleed more easily. Try to avoid damage to your teeth and gums when you brush or floss your teeth.  NOTE:This sheet is a summary. It may not cover all possible information. If you have questions about this medicine, talk to your doctor, pharmacist, or health care provider. Copyright© 2017 [employer]

## 2020-11-12 ENCOUNTER — TELEPHONE (OUTPATIENT)
Dept: INTERNAL MEDICINE | Facility: CLINIC | Age: 60
End: 2020-11-12

## 2020-11-12 NOTE — TELEPHONE ENCOUNTER
----- Message from Trang Hardy sent at 11/12/2020 10:46 AM CST -----  Contact: Rivka Tineo is requesting a referral, last 2 visit notes, and any labs to be sent to  Dr. Medina fax number [phone]. Please call her back at [phone].      Thanks  DD

## 2020-11-13 DIAGNOSIS — M79.7 FIBROMYALGIA: ICD-10-CM

## 2020-11-13 DIAGNOSIS — M06.09 RHEUMATOID ARTHRITIS, SERONEGATIVE, MULTIPLE SITES: Primary | ICD-10-CM

## 2020-11-16 ENCOUNTER — SPECIALTY PHARMACY (OUTPATIENT)
Dept: PHARMACY | Facility: CLINIC | Age: 60
End: 2020-11-16

## 2020-11-16 NOTE — TELEPHONE ENCOUNTER
Specialty Pharmacy - Refill Coordination    Specialty Medication Orders Linked to Encounter      Most Recent Value   Medication #1  upadacitinib (RINVOQ) 15 mg 24 hr tablet (Order#[id number], Rx#[id number])          Refill Questions - Documented Responses      Most Recent Value   Relationship to patient of person spoken to?  Self   HIPAA/medical authority confirmed?  Yes   Any changes in contact preferences or allowed representatives?  No   Has the patient had any insurance changes?  No   Has the patient had any changes to specialty medication, dose, or instructions?  No   Has the patient started taking any new medications, herbals, or supplements?  No   Has the patient been diagnosed with any new medical conditions?  No   Does the patient have any new allergies to medications or foods?  No   Does the patient have any concerns about side effects?  No   Can the patient store medication/sharps container properly (at the correct temperature, away from children/pets, etc.)?  Yes   Can the patient call emergency services (911) in the event of an emergency?  Yes   Does the patient have any concerns or questions about taking or administering this medication as prescribed?  No   How many doses did the patient miss in the past 4 weeks or since the last fill?  0   How many doses does the patient have on hand?  5   How many days does the patient report on hand quantity will last?  5   Does the number of doses/days supply remaining match pharmacy expected amounts?  Yes   Does the patient feel that this medication is effective?  Yes   During the past 4 weeks, has patient missed any activities due to condition or medication?  No   During the past 4 weeks, did patient have any of the following urgent care visits?  None   How will the patient receive the medication?  Mail   When does the patient need to receive the medication?  11/20/20   Shipping Address  Home   Address in [facility] confirmed and updated if neccessary?   Yes   Expected Copay ($)  0   Is the patient able to afford the medication copay?  Yes   Payment Method  zero copay   Days supply of Refill  30   Would patient like to speak to a pharmacist?  No   Do you want to trigger an intervention?  No   Do you want to trigger an additional referral task?  No   Refill activity completed?  Yes   Refill activity plan  Refill scheduled   Shipment/Pickup Date:  11/18/20          Current Outpatient Medications   Medication Sig    benazepriL (LOTENSIN) 20 MG tablet Take 1 tablet (20 mg total) by mouth once daily.    clonazePAM (KLONOPIN) 1 MG tablet Take 1 tablet (1 mg total) by mouth 2 (two) times daily.    clotrimazole-betamethasone 1-0.05% (LOTRISONE) cream Apply topically 2 (two) times daily as needed.    DULoxetine (CYMBALTA) 30 MG capsule Take 30 mg by mouth once daily.    esomeprazole (NEXIUM) 40 MG capsule Take 1 capsule (40 mg total) by mouth before breakfast.    fluconazole (DIFLUCAN) 200 MG Tab Take 1 tablet (200 mg total) by mouth twice a week. (Patient taking differently: Take 200 mg by mouth twice a week. PRN)    fluticasone propionate (FLONASE) 50 mcg/actuation nasal spray 2 sprays (100 mcg total) by Each Nostril route once daily.    hydroCHLOROthiazide (HYDRODIURIL) 25 MG tablet Take 1 tablet (25 mg total) by mouth once daily.    HYDROcodone-acetaminophen (NORCO) 5-325 mg per tablet Take 1 tablet by mouth every 8 (eight) hours as needed for Pain.    hydrocortisone (PROCTOSOL HC) 2.5 % rectal cream Place rectally 2 (two) times daily as needed for Hemorrhoids.    metroNIDAZOLE (METROGEL) 0.75 % gel AAA of face bid    montelukast (SINGULAIR) 10 mg tablet     mupirocin (BACTROBAN) 2 % ointment Apply topically 2 (two) times daily.    naproxen (NAPROSYN) 500 MG tablet     nitrofurantoin, macrocrystal-monohydrate, (MACROBID) 100 MG capsule Take 1 capsule (100 mg total) by mouth every evening.    nystatin (MYCOSTATIN) powder Apply topically 2 (two) times  daily.    nystatin-triamcinolone (MYCOLOG II) cream Apply topically 2 (two) times daily.    ondansetron (ZOFRAN-ODT) 4 MG TbDL Take 1 tablet (4 mg total) by mouth every 6 (six) hours as needed (Nausea or Vomiting). (Patient not taking: Reported on 11/6/2020)    pregabalin (LYRICA) 75 MG capsule Take 1 capsule (75 mg total) by mouth 2 (two) times daily.    sulfacetamide sodium, acne, 10 % Susp APPLY 1 APPLICATION TOPICALLY EVERY DAY    tiZANidine (ZANAFLEX) 4 MG tablet Take 2 tablets (8 mg total) by mouth every evening.    tolterodine (DETROL LA) 4 MG 24 hr capsule Take 1 capsule (4 mg total) by mouth every evening.    upadacitinib (RINVOQ) 15 mg 24 hr tablet Take 1 tablet (15 mg total) by mouth once daily.    valACYclovir (VALTREX) 1000 MG tablet Take 1 tablet (1,000 mg total) by mouth once daily. (Patient taking differently: Take 1,000 mg by mouth once daily. PRN)   Last reviewed on 11/6/2020  2:13 PM by Susan Soriano MA    Review of patient's allergies indicates:   Allergen Reactions    Clindamycin Diarrhea and Nausea And Vomiting    Lisinopril Other (See Comments)     Low BP    Morphine Nausea And Vomiting    Stadol [butorphanol tartrate] Hallucinations    Quinine sulfate     Last reviewed on  11/6/2020 2:09 PM by Susan Soriano      Tasks added this encounter   No tasks added.   Tasks due within next 3 months   11/9/2020 - Refill Call  11/23/2020 - Clinical - Follow Up Assesement (90 day)     Sandrine Franco, PharmD  [facility]  [address] [place] [id number]  Phone: [phone]  Fax: [phone]

## 2020-11-17 ENCOUNTER — TELEPHONE (OUTPATIENT)
Dept: PAIN MEDICINE | Facility: CLINIC | Age: 60
End: 2020-11-17

## 2020-11-17 NOTE — TELEPHONE ENCOUNTER
----- Message from Ramezrory  sent at 11/17/2020 10:14 AM CST -----  Type:  Needs Medical Advice    Who Called: Pt   Symptoms (please be specific):    How long has patient had these symptoms:    Pharmacy name and phone #:    Would the patient rather a call back or a response via [facility]? Call   Best Call Back Number: [phone] (home)   Additional Information:   Pt is requesting a call back in regards to appt / procedure on tomorrow 11/18/20  She will need to reschedule and needs to be the first pt on the list preferably at 7am.

## 2020-11-18 ENCOUNTER — TELEPHONE (OUTPATIENT)
Dept: INTERNAL MEDICINE | Facility: CLINIC | Age: 60
End: 2020-11-18

## 2020-11-18 NOTE — TELEPHONE ENCOUNTER
----- Message from Shayy Jensen sent at 11/18/2020  3:05 PM CST -----  Regarding: Dr Medina referral  Type:  Needs Medical Advice    Who Called: pt  Symptoms (please be specific): n/a   How long has patient had these symptoms:  n/a  Pharmacy name and phone #:  n/a  Would the patient rather a call back or a response via [facility]? Call back   Best Call Back Number: [phone]   Additional Information: Pl;ease call scotty l/Hazel

## 2020-11-20 ENCOUNTER — TELEPHONE (OUTPATIENT)
Dept: ADMINISTRATIVE | Facility: HOSPITAL | Age: 60
End: 2020-11-20

## 2020-11-22 ENCOUNTER — OFFICE VISIT (OUTPATIENT)
Dept: URGENT CARE | Facility: CLINIC | Age: 60
End: 2020-11-22
Payer: COMMERCIAL

## 2020-11-22 ENCOUNTER — NURSE TRIAGE (OUTPATIENT)
Dept: ADMINISTRATIVE | Facility: CLINIC | Age: 60
End: 2020-11-22

## 2020-11-22 VITALS
BODY MASS INDEX: 51.56 KG/M2 | HEIGHT: 63 IN | SYSTOLIC BLOOD PRESSURE: 142 MMHG | WEIGHT: 291 LBS | OXYGEN SATURATION: 100 % | TEMPERATURE: 103 F | HEART RATE: 99 BPM | DIASTOLIC BLOOD PRESSURE: 63 MMHG | RESPIRATION RATE: 18 BRPM

## 2020-11-22 DIAGNOSIS — R11.2 NAUSEA AND VOMITING, INTRACTABILITY OF VOMITING NOT SPECIFIED, UNSPECIFIED VOMITING TYPE: ICD-10-CM

## 2020-11-22 DIAGNOSIS — U07.1 COVID-19 VIRUS DETECTED: Primary | ICD-10-CM

## 2020-11-22 DIAGNOSIS — R50.9 FEVER, UNSPECIFIED FEVER CAUSE: ICD-10-CM

## 2020-11-22 LAB
CTP QC/QA: YES
SARS-COV-2 RDRP RESP QL NAA+PROBE: POSITIVE

## 2020-11-22 PROCEDURE — 1125F PR PAIN SEVERITY QUANTIFIED, PAIN PRESENT: ICD-10-PCS | Mod: S$GLB,,, | Performed by: NURSE PRACTITIONER

## 2020-11-22 PROCEDURE — 1125F AMNT PAIN NOTED PAIN PRSNT: CPT | Mod: S$GLB,,, | Performed by: NURSE PRACTITIONER

## 2020-11-22 PROCEDURE — 99213 PR OFFICE/OUTPT VISIT, EST, LEVL III, 20-29 MIN: ICD-10-PCS | Mod: S$GLB,,, | Performed by: NURSE PRACTITIONER

## 2020-11-22 PROCEDURE — U0002 COVID-19 LAB TEST NON-CDC: HCPCS | Mod: QW,S$GLB,, | Performed by: NURSE PRACTITIONER

## 2020-11-22 PROCEDURE — 3008F PR BODY MASS INDEX (BMI) DOCUMENTED: ICD-10-PCS | Mod: CPTII,S$GLB,, | Performed by: NURSE PRACTITIONER

## 2020-11-22 PROCEDURE — 99213 OFFICE O/P EST LOW 20 MIN: CPT | Mod: S$GLB,,, | Performed by: NURSE PRACTITIONER

## 2020-11-22 PROCEDURE — U0002: ICD-10-PCS | Mod: QW,S$GLB,, | Performed by: NURSE PRACTITIONER

## 2020-11-22 PROCEDURE — 3008F BODY MASS INDEX DOCD: CPT | Mod: CPTII,S$GLB,, | Performed by: NURSE PRACTITIONER

## 2020-11-22 RX ORDER — ONDANSETRON 8 MG/1
8 TABLET, ORALLY DISINTEGRATING ORAL
Status: COMPLETED | OUTPATIENT
Start: 2020-11-22 | End: 2020-11-22

## 2020-11-22 RX ORDER — ACETAMINOPHEN 500 MG
1000 TABLET ORAL
Status: COMPLETED | OUTPATIENT
Start: 2020-11-22 | End: 2020-11-22

## 2020-11-22 RX ORDER — ONDANSETRON HYDROCHLORIDE 8 MG/1
8 TABLET, FILM COATED ORAL EVERY 12 HOURS PRN
Qty: 6 TABLET | Refills: 0 | Status: SHIPPED | OUTPATIENT
Start: 2020-11-22 | End: 2020-11-25

## 2020-11-22 RX ADMIN — ONDANSETRON 8 MG: 8 TABLET, ORALLY DISINTEGRATING ORAL at 04:11

## 2020-11-22 RX ADMIN — Medication 1000 MG: at 04:11

## 2020-11-22 NOTE — PROGRESS NOTES
"Subjective:       Patient ID: Rivka Jurado is a 60 y.o. female.    Vitals:  height is 5' 3" (1.6 m) and weight is 132 kg (291 lb). Her tympanic temperature is 102.7 °F (39.3 °C) (abnormal). Her blood pressure is 142/63 (abnormal) and her pulse is 99. Her respiration is 18 and oxygen saturation is 100%.     Chief Complaint: No chief complaint on file.    Patient present with covid concerns. Pt complains of fever,nausea,diarrhea,cough,bodyaches and chills since Wednesday. Taken OTC med(tylenol)mild relief. Known exposure to covid.    Other  This is a new problem. The current episode started in the past 7 days (Wednesday). The problem occurs constantly. The problem has been rapidly worsening. Associated symptoms include abdominal pain, chills, congestion, coughing, a fever, headaches, nausea and vomiting. Pertinent negatives include no diaphoresis, fatigue, myalgias, rash or sore throat. The symptoms are aggravated by coughing. She has tried acetaminophen for the symptoms.       Constitution: Positive for appetite change, chills, fever and generalized weakness. Negative for sweating and fatigue.   HENT: Positive for congestion. Negative for ear pain, sinus pain, sinus pressure, sore throat and voice change.    Neck: Negative for painful lymph nodes.   Eyes: Negative for eye redness.   Respiratory: Positive for cough and shortness of breath. Negative for chest tightness, sputum production, bloody sputum, COPD, stridor, wheezing and asthma.    Gastrointestinal: Positive for abdominal pain, nausea, vomiting and diarrhea.   Musculoskeletal: Negative for muscle ache.   Skin: Negative for rash.   Allergic/Immunologic: Negative for seasonal allergies and asthma.   Neurological: Positive for headaches.   Hematologic/Lymphatic: Negative for swollen lymph nodes.       Objective:      Physical Exam   Constitutional: She is oriented to person, place, and time. She appears well-developed. She is cooperative.  Non-toxic " appearance. She does not appear ill. No distress.   HENT:   Head: Normocephalic and atraumatic.   Ears:   Right Ear: Hearing, tympanic membrane, external ear and ear canal normal.   Left Ear: Hearing, tympanic membrane, external ear and ear canal normal.   Nose: Nose normal. No mucosal edema, rhinorrhea or nasal deformity. No epistaxis. Right sinus exhibits no maxillary sinus tenderness and no frontal sinus tenderness. Left sinus exhibits no maxillary sinus tenderness and no frontal sinus tenderness.   Mouth/Throat: Uvula is midline, oropharynx is clear and moist and mucous membranes are normal. No trismus in the jaw. Normal dentition. No uvula swelling. No oropharyngeal exudate, posterior oropharyngeal edema or posterior oropharyngeal erythema.   Eyes: Conjunctivae and lids are normal. No scleral icterus.   Neck: Trachea normal, full passive range of motion without pain and phonation normal. Neck supple. No neck rigidity. No edema and no erythema present.   Cardiovascular: Normal rate, regular rhythm, normal heart sounds and normal pulses.   Pulmonary/Chest: Effort normal and breath sounds normal. No respiratory distress. She has no decreased breath sounds. She has no rhonchi.   Abdominal: Normal appearance.   Musculoskeletal: Normal range of motion.         General: No deformity.   Neurological: She is alert and oriented to person, place, and time. She exhibits normal muscle tone. Coordination normal.   Skin: Skin is warm, dry, intact, not diaphoretic and not pale. Psychiatric: Her speech is normal and behavior is normal. Judgment and thought content normal.   Nursing note and vitals reviewed.        Assessment:       1. COVID-19 virus detected    2. Fever, unspecified fever cause    3. Nausea and vomiting, intractability of vomiting not specified, unspecified vomiting type        Plan:         COVID-19 virus detected  -     COVID-19 Home Symptom Monitoring  - Duration (days): 14    Fever, unspecified fever  cause  -     POCT COVID-19 Rapid Screening  -     acetaminophen tablet 1,000 mg    Nausea and vomiting, intractability of vomiting not specified, unspecified vomiting type  -     ondansetron disintegrating tablet 8 mg  -     ondansetron (ZOFRAN) 8 MG tablet; Take 1 tablet (8 mg total) by mouth every 12 (twelve) hours as needed for Nausea.  Dispense: 6 tablet; Refill: 0      Results for orders placed or performed in visit on 11/22/20   POCT COVID-19 Rapid Screening   Result Value Ref Range    POC Rapid COVID Positive (A) Negative     Acceptable Yes         COVID Education  · Your symptoms are likely due to a viral infection. These infections can last up to 14 days, but you should notice some improvement of your symptoms within the first 7-10 days. Viral infections will not improve with antibiotics. If your symptoms persist >10 days without improvement or if you have any new or worsening symptoms, this is an indication that you may have developed a bacterial infection and should return to your primary care provider or to Urgent Care.   · Getting plenty of rest is very important to fighting infections.  · Increase fluids.   · Plain Mucinex as advised  · May apply warm compresses as needed for facial pain and congestion.   · Saline nasal spray to loosen nasal congestion.  · Flonase or Nasacort to reduce inflammation in the sinus cavities.  · You may take an over the counter antihistamine for allergy symptoms such as sneezing, itchy/watery eyes, scratchy throat, or congestion.  · Take Tylenol or Ibuprofen as needed for sore throat, body aches, or fever.  · Follow up with your primary care provider if symptoms persist >10 days or sooner for any new or worsening symptoms.   Go to the ER for any fever that does not improve with Tylenol/Ibuprofen, neck stiffness, rash, severe headache, vision changes, shortness of breath, chest pain, facial swelling, severe facial pain, or any other new and concerning  symptoms.

## 2020-11-22 NOTE — PATIENT INSTRUCTIONS
Instructions for Patients with Confirmed or Suspected COVID-19    If you are awaiting your test result, you will either be called or it will be released to the patient portal.  If you have any questions about your test, please visit [email or web address] or call our COVID-19 information line at [phone].      Instructions for non-hospitalized or discharged patients with confirmed or suspected COVID-19:       Stay home except to get medical care.    Separate yourself from other people and animals in your home.    Call ahead before visiting your doctor.    Wear a face mask.    Cover your coughs and sneezes.    Clean your hands often.    Avoid sharing personal household items.    Clean all high-touch surfaces every day.    Monitor your symptoms. Seek prompt medical attention if your illness is worsening (e.g., difficulty breathing). Before seeking care, call your healthcare provider.    If you have a medical emergency and must call 911, notify the dispatcher that you have or are being evaluated for COVID-19. If possible, put on a face mask before emergency medical services arrive.    Use the following symptom-based strategy to return to normal activity following a suspected or confirmed case of COVID-19. Continue isolation until:   o At least 3 days (72 hours) have passed since recovery defined as resolution of fever without the use of fever-reducing medications and improvement in respiratory symptoms (e.g. cough, shortness of breath), and   o At least 10 days have passed since the first positive test.       As one of the next steps, you will receive a call or text from the [place] Department of Health ([facility]) COVID-19 Tracing Team. See the contact information below so you know not to ignore the health departments call. It is important that you contact them back immediately so they can help.     Contact Tracer Number:  [phone]  Caller ID for most carriers: [place] Dept [facility]    What is  contact tracing?   Contact tracing is a process that helps identify everyone who has been in close contact with an infected person. Contact tracers let those people know they may have been exposed and guide them on next steps. Confidentiality is important for everyone; no one will be told who may have exposed them to the virus.   Your involvement is important. The more we know about where and how this virus is spreading, the better chance we have at stopping it from spreading further.  What does exposure mean?   Exposure means you have been within 6 feet for more than 15 minutes with a person who has or had COVID-19.  What kind of questions do the contact tracers ask?   A contact tracer will confirm your basic contact information including name, address, phone number, and next of kin, as well as asking about any symptoms you may have had. Theyll also ask you how you think you may have gotten sick, such as places where you may have been exposed to the virus, and people you were with. Those names will never be shared with anyone outside of that call, and will only be used to help trace and stop the spread of the virus.   I have privacy concerns. How will the state use my information?   Your privacy about your health is important. All calls are completed using call centers that use the appropriate health privacy protection measures (HIPAA compliance), meaning that your patient information is safe. No one will ever ask you any questions related to immigration status. Your health comes first.   Do I have to participate?   You do not have to participate, but we strongly encourage you to. Contact tracing can help us catch and control new outbreaks as theyre developing to keep your friends and family safe.   What if I dont hear from anyone?   If you dont receive a call within 24 hours, you can call the number above right away to inquire about your status. That line is open from 8:00 am - 8:00 p.m., 7 days a  week.  Contact tracing saves lives! Together, we have the power to beat this virus and keep our loved ones and neighbors safe.       Instructions for household members, intimate partners and caregivers in a non-healthcare setting of a patient with confirmed or suspected COVID-19:         Close contacts should monitor their health and call their healthcare provider right away if they develop symptoms suggestive of COVID-19 (e.g., fever, cough, shortness of breath).    Stay home except to get medical care. Separate yourself from other people and animals in the home.   Monitor the patients symptoms. If the patient is getting sicker, call his or her healthcare provider. If the patient has a medical emergency and you need to call 911, notify the dispatch personnel that the patient has or is being evaluated for COVID-19.    Wear a facemask when around other people such as sharing a room or vehicle and before entering a healthcare provider's office.   Cover coughs and sneezes with a tissue. Throw used tissues in a lined trash can immediately and wash hands.   Clean hands often with soap and water for at least 20 seconds or with an alcohol-based hand , rubbing hands together until they feel dry. Avoid touching your eyes, nose, and mouth with unwashed hands.   Clean all high-touch; surfaces every day, including counters, tabletops, doorknobs, bathroom fixtures, toilets, phones, keyboards, tablets, bedside tables, etc. Use a household cleaning spray or wipe according to label instructions.   Avoid sharing personal household items such as dishes, drinking glasses, cups, towels, bedding, etc. After these items are used, they should be washed thoroughly with soap and water.   Continue isolation until:   At least 3 days (72 hours) have passed since recovery defined as resolution of fever without the use of fever-reducing medications and improvement in respiratory symptoms (e.g. cough, shortness of breath),  and    At least 10 days have passed since the patients first positive test.    https://www.cdc.gov/coronavirus/2019-ncov/your-health/index.htm

## 2020-11-23 ENCOUNTER — TELEPHONE (OUTPATIENT)
Dept: PAIN MEDICINE | Facility: CLINIC | Age: 60
End: 2020-11-23

## 2020-11-23 ENCOUNTER — SPECIALTY PHARMACY (OUTPATIENT)
Dept: PHARMACY | Facility: CLINIC | Age: 60
End: 2020-11-23

## 2020-11-23 ENCOUNTER — TELEPHONE (OUTPATIENT)
Dept: INTERNAL MEDICINE | Facility: CLINIC | Age: 60
End: 2020-11-23

## 2020-11-23 ENCOUNTER — OFFICE VISIT (OUTPATIENT)
Dept: INTERNAL MEDICINE | Facility: CLINIC | Age: 60
End: 2020-11-23
Payer: COMMERCIAL

## 2020-11-23 DIAGNOSIS — U07.1 COVID-19 VIRUS INFECTION: Primary | ICD-10-CM

## 2020-11-23 PROCEDURE — 99442 PR PHYSICIAN TELEPHONE EVALUATION 11-20 MIN: CPT | Mod: 95,,, | Performed by: PEDIATRICS

## 2020-11-23 PROCEDURE — 99442 PR PHYSICIAN TELEPHONE EVALUATION 11-20 MIN: ICD-10-PCS | Mod: 95,,, | Performed by: PEDIATRICS

## 2020-11-23 NOTE — TELEPHONE ENCOUNTER
Telephone conversation of 15 minutes with patient. She has mild myalgias and URI symptoms and fever. Her comorbidity is morbid obesity. She may qualify to emergency Bamlanivimab use. This was discussed with patient including side effects. I will send her more info on it. I will enroll her in covid home monitoring. She will talk to her daughter a nurse to let us know about BAm if she should like. >15 minutes with patient.

## 2020-11-23 NOTE — TELEPHONE ENCOUNTER
Patient called Karly back to inform she was just diagnosed with covid and not feeling up to talking right now d/t symptoms. We will pend her f/u assessment until her next refill call.

## 2020-11-23 NOTE — TELEPHONE ENCOUNTER
Patient was tested positive yesterday for covid.  Patient's temp was 102.4 and was told to take Tylenol around the clock.  Patient wasn't given any medications for symptoms and wants to if you could/would send something for coughing, sore throat, and body aches and she says she's experiencing just a little trouble breathing but not bad enough to call 911 or go to the ER.  Patient does have diarrhea but she's taking immodium AD for that and it subsided.  Patient states to please let provider know she is in kidney failure.

## 2020-11-23 NOTE — PATIENT INSTRUCTIONS
Fact Sheet for Patients, Parents and Caregivers Emergency Use Authorization (EUA) of Bamlanivimab for Coronavirus Disease 2019 (COVID-19)     You are being given a medicine called bamlanivimab for the treatment of coronavirus disease 2019 (COVID19). This Fact Sheet contains information to help you understand the potential risks and potential benefits of taking bamlanivimab, which you may receive.             Receiving bamlanivimab may benefit certain people with COVID-19.     Read this Fact Sheet for information about bamlanivimab. Talk to your healthcare provider if you have questions. It is your choice to receive bamlanivimab or stop it at any time.     What is COVID-19?   COVID-19 is caused by a virus called a coronavirus. People can get COVID-19 through contact with another person who has the virus.     COVID-19 illnesses have ranged from very mild (including some with no reported symptoms) to severe, including illness resulting in death. While information so far suggests that most COVID-19 illness is mild, serious illness can happen and may cause some of your other medical conditions to become worse. People of all ages with severe, long-lasting (chronic) medical conditions like heart disease, lung disease, and diabetes, for example, seem to be at higher risk of being hospitalized for COVID-19.     What are the symptoms of COVID-19?   The symptoms of COVID-19 include fever, cough, and shortness of breath, which may appear 2 to 14 days after exposure. Serious illness including breathing problems can occur and may cause your other medical conditions to become worse.     What is bamlanivimab?   Bamlanivimab is an investigational medicine used for the treatment of COVID-19 in non-hospitalized adults and adolescents 12 years of age and older with mild to moderate symptoms who weigh 88 pounds (40 kg) or more, and who are at high risk for developing severe COVID-19 symptoms or the need for hospitalization. Bamlanivimab  is investigational because it is still being studied. There is limited information known about the safety or effectiveness of using bamlanivimab to treat people with COVID-19.     The FDA has authorized the emergency use of bamlanivimab for the treatment of COVID-19 under an Emergency Use Authorization (EUA). For more information on EUA, see the section What is an Emergency Use Authorization (EUA)? at the end of this Fact Sheet.     What should I tell my healthcare provider before I receive bamlanivimab?   Tell your healthcare provider about all of your medical conditions, including if you:   · Have any allergies   · Are pregnant or plan to become pregnant   · Are breastfeeding or plan to breastfeed   · Have any serious illnesses   · Are taking any medications (prescription, over-the-counter, vitamins, and herbal products)     How will I receive bamlanivimab?   · Bamlanivimab is given to you through a vein (intravenous or IV) for at least 1 hour.   · You will receive one dose of bamlanivimab by IV infusion.     What are the important possible side effects of bamlanivimab?   Possible side effects of bamlanivimab are:   · Allergic reactions. Allergic reactions can happen during and after infusion with bamlanivimab. Tell your healthcare provider right away if you get any of the following signs and symptoms of allergic reactions: fever, 2 chills, nausea, headache, shortness of breath, low blood pressure, wheezing, swelling of your lips, face, or throat, rash including hives, itching, muscle aches, and dizziness.     The side effects of getting any medicine by vein may include brief pain, bleeding, bruising of the skin, soreness, swelling, and possible infection at the infusion site.     These are not all the possible side effects of bamlanivimab. Not a lot of people have been given bamlanivimab. Serious and unexpected side effects may happen. Bamlanivimab is still being studied so it is possible that all of the risks  are not known at this time.     It is possible that bamlanivimab could interfere with your body's own ability to fight off a future infection of SARS-CoV-2. Similarly, bamlanivimab may reduce your bodys immune response to a vaccine for SARS-CoV-2. Specific studies have not been conducted to address these possible risks. Talk to your healthcare provider if you have any questions.     What other treatment choices are there?   Like bamlanivimab, FDA may allow for the emergency use of other medicines to treat people with COVID-19. Go to www.wuaqb22bqayalveatuiwlbwrgc.nih.gov/ for information on the emergency use of other medicines that are not approved by FDA to treat people with COVID-19. Your healthcare provider may talk with you about clinical trials you may be eligible for.     It is your choice to be treated or not to be treated with bamlanivimab. Should you decide not to receive bamlanivimab or stop it at any time, it will not change your standard medical care.     What if I am pregnant or breastfeeding?   There is limited experience treating pregnant women or breastfeeding mothers with bamlanivimab. For a mother and unborn baby, the benefit of receiving bamlanivimab may be greater than the risk from the treatment. If you are pregnant or breastfeeding, discuss your options and specific situation with your healthcare provider.     How do I report side effects with bamlanivimab?   Tell your healthcare provider right away if you have any side effect that bothers you or does not go away.     Report side effects to FDA MedWatch at www.fda.gov/medwatch, call 8-232-ADB-7670, or contact [employer] and [employer] at 1-855-lillyc19 (1-708.555.6344).       How can I learn more?   · Ask your healthcare provider   · Visit www.[employer]yn  · Visit www.sodhr09duwlvcgksrukmvlhtnh.nih.gov/  · Contact your local or state public health department     What is an Emergency Use Authorization (EUA)?   The [place] FDA has made  bamlanivimab available under an emergency access mechanism called an EUA. The EUA is supported by a  of Health and Human Service ([facility]) declaration that circumstances exist to justify the emergency use of drugs and biological products during the COVID-19 pandemic.     Bamlanivimab has not undergone the same type of review as an FDA-approved or cleared product. The FDA may issue an EUA when certain criteria are met, which includes that there are no adequate, approved, and available alternatives. In addition, the FDA decision is based on the totality of scientific evidence available showing that it is reasonable to believe that the product meets certain criteria for safety, performance, and labeling and may be effective in treatment of patients during the COVID-19 pandemic. All of these criteria must be met to allow for the product to be used in the treatment of patients during the COVID-19 pandemic.     The EUA for bamlanivimab is in effect for the duration of the COVID-19 declaration justifying emergency use of these products, unless terminated or revoked (after which the product may no longer be used).     Literature issued November 2020     [employer], [place] [address], [place]   Copyright © 2020, [employer]. All rights reserved.     [id number]

## 2020-11-23 NOTE — PROGRESS NOTES
Audio Only Telehealth Visit     The patient location is: her home  The chief complaint leading to consultation is: covid  Visit type: Virtual visit with audio only (telephone)  Total time spent with patient: 15 min     The reason for the audio only service rather than synchronous audio and video virtual visit was related to technical difficulties or patient preference/necessity.     Each patient to whom I provide medical services by telemedicine is:  (1) informed of the relationship between the physician and patient and the respective role of any other health care provider with respect to management of the patient; and (2) notified that they may decline to receive medical services by telemedicine and may withdraw from such care at any time. Patient verbally consented to receive this service via voice-only telephone call.       HPI: tested positive for covid yesterday     Assessment and plan:  Telephone conversation of 15 minutes with patient. She has mild myalgias and URI symptoms and fever. Her comorbidity is morbid obesity. She may qualify to emergency Bamlanivimab use. This was discussed with patient including side effects. I will send her more info on it. I will enroll her in covid home monitoring. She will talk to her daughter a nurse to let us know about BAm if she should like.                         This service was not originating from a related E/M service provided within the previous 7 days nor will  to an E/M service or procedure within the next 24 hours or my soonest available appointment.  Prevailing standard of care was able to be met in this audio-only visit.

## 2020-11-23 NOTE — TELEPHONE ENCOUNTER
----- Message from Brenda Horn sent at 11/23/2020  9:27 AM CST -----  Contact: self- [phone]  Would like to consult with the nurse,  patient has Covid- patient would like to speak with the nurse concerning getting something call in for her, patient would like a call back as soon as possible, please call back thanks sj

## 2020-11-23 NOTE — TELEPHONE ENCOUNTER
Spoke with Ms. Jurado regarding recent diagnosis of Covid-19.  She has some mild shortness of breath, diarrhea, and fever.  She is concerned as she has no PCP in the [place] area is wondering what to do moving forward regarding her diagnosis.  We discussed that if she has temperature above 101.5 then she would need to be seen in the hospital.  We discussed drinking lots of fluids to help remain hydrated especially if diarrhea continues for her.  I have asked her to remain in touch with us if there are any changes or she has further questions.    LUPE Salmon MD  Interventional Pain Medicine  [facility] [place]      ----- Message from Effie Alberto MA sent at 11/23/2020  8:20 AM CST -----  Contact: patient  Please contact pt   ----- Message -----  From: Rupali Retana  Sent: 11/23/2020   7:16 AM CST  To: Viky Shrestha Staff    Patient only wants to speak to Dr Salmon directly regarding Covid, was DX with it by  , patient wants to know what she needs to do, was not given any medication for, and only trust what Dr Salmon will do, please have Dr Salmon call her back at [phone]

## 2020-11-25 ENCOUNTER — TELEPHONE (OUTPATIENT)
Dept: INTERNAL MEDICINE | Facility: CLINIC | Age: 60
End: 2020-11-25

## 2020-11-25 NOTE — TELEPHONE ENCOUNTER
----- Message from Jeanette Schumacher sent at 11/25/2020  8:50 AM CST -----  Pt is calling regarding her Alter mental status at night. Pt is not getting any better. Would like to speak with Conrado. Please call back at [phone]

## 2020-12-01 ENCOUNTER — TELEPHONE (OUTPATIENT)
Dept: PAIN MEDICINE | Facility: CLINIC | Age: 60
End: 2020-12-01

## 2020-12-01 NOTE — TELEPHONE ENCOUNTER
----- Message from Joseph Pitts sent at 12/1/2020  9:11 AM CST -----  Regarding: /son   called in to speak with someone at the office regarding cancelling her appointment. He mention that his mother is current in the hospital with experiencing symptoms of covid-19. He would like a call back from the office and can be reached at    [phone]

## 2020-12-10 ENCOUNTER — PES CALL (OUTPATIENT)
Dept: ADMINISTRATIVE | Facility: CLINIC | Age: 60
End: 2020-12-10

## 2020-12-11 ENCOUNTER — PATIENT MESSAGE (OUTPATIENT)
Dept: OTHER | Facility: OTHER | Age: 60
End: 2020-12-11

## 2021-01-04 ENCOUNTER — PATIENT MESSAGE (OUTPATIENT)
Dept: PHARMACY | Facility: CLINIC | Age: 61
End: 2021-01-04

## 2021-01-12 ENCOUNTER — SPECIALTY PHARMACY (OUTPATIENT)
Dept: PHARMACY | Facility: CLINIC | Age: 61
End: 2021-01-12

## 2021-01-12 ENCOUNTER — TELEPHONE (OUTPATIENT)
Dept: RHEUMATOLOGY | Facility: CLINIC | Age: 61
End: 2021-01-12

## 2021-03-11 ENCOUNTER — TELEPHONE (OUTPATIENT)
Dept: RHEUMATOLOGY | Facility: CLINIC | Age: 61
End: 2021-03-11

## 2021-03-12 ENCOUNTER — PATIENT OUTREACH (OUTPATIENT)
Dept: ADMINISTRATIVE | Facility: OTHER | Age: 61
End: 2021-03-12

## 2021-03-12 DIAGNOSIS — Z12.31 ENCOUNTER FOR SCREENING MAMMOGRAM FOR MALIGNANT NEOPLASM OF BREAST: Primary | ICD-10-CM

## 2021-04-23 ENCOUNTER — PATIENT OUTREACH (OUTPATIENT)
Dept: ADMINISTRATIVE | Facility: HOSPITAL | Age: 61
End: 2021-04-23

## 2021-05-07 ENCOUNTER — PATIENT MESSAGE (OUTPATIENT)
Dept: ADMINISTRATIVE | Facility: HOSPITAL | Age: 61
End: 2021-05-07

## 2021-05-07 ENCOUNTER — PATIENT OUTREACH (OUTPATIENT)
Dept: ADMINISTRATIVE | Facility: HOSPITAL | Age: 61
End: 2021-05-07

## 2021-09-01 NOTE — TELEPHONE ENCOUNTER
----- Message from Uli Cortez sent at 12/13/2017 10:12 AM CST -----  Contact: self [phone]  Would like to consult with nurse regarding issue with painful abscess on nose.  Please call back at [phone].  Md Gertrude  
Called pt, no answer.  
MD Mary Jo Chaves LPN Cc: BRITTANY REEDER Staff   Caller: Unspecified (Today, 10:59 AM)              is she using Bactroban ointment 3 times a day to the area with warm compresses?        
Prescriptions sent in. Pt notified.  
S/w pt. Pt stated that she has a bump on the tip of her nose that is warm to touch, hard and painful, no drainage noted. Pt is concerned that it is staph and would like something called into [facility] or she would like to know if she needs to be seen. Told pt that I would discuss with Dr. Singh and return her call. Pt verbalized understanding.  
S/w pt. Pt stated that she is currently using bactroban but it is really painful and she has not used warm compresses. Told pt that I would discuss with Dr. Singh and return her call. Pt verbalized understanding.  
1.67

## 2021-09-29 ENCOUNTER — TELEPHONE (OUTPATIENT)
Dept: INTERNAL MEDICINE | Facility: CLINIC | Age: 61
End: 2021-09-29

## 2021-09-29 ENCOUNTER — PATIENT MESSAGE (OUTPATIENT)
Dept: ADMINISTRATIVE | Facility: HOSPITAL | Age: 61
End: 2021-09-29

## 2021-09-29 ENCOUNTER — PATIENT OUTREACH (OUTPATIENT)
Dept: ADMINISTRATIVE | Facility: HOSPITAL | Age: 61
End: 2021-09-29

## 2021-11-17 ENCOUNTER — PATIENT OUTREACH (OUTPATIENT)
Dept: ADMINISTRATIVE | Facility: HOSPITAL | Age: 61
End: 2021-11-17
Payer: COMMERCIAL

## 2022-02-01 ENCOUNTER — PATIENT OUTREACH (OUTPATIENT)
Dept: ADMINISTRATIVE | Facility: HOSPITAL | Age: 62
End: 2022-02-01
Payer: COMMERCIAL

## 2022-02-01 NOTE — TELEPHONE ENCOUNTER
----- Message from Sirena Don sent at 1/6/2020  3:33 PM CST -----  Contact: Pt  Pt is requesting call back in regards to pharmacy stating that they haven't received prescription.      Medication:  clonazePAM (KLONOPIN) 1 MG tablet  Pharmacy:  [facility] [place] [facility] [place] - [address] [facility]  [address] [place] [address]  Phone: [phone] Fax: [phone]    Pls call back at [phone]     Solaraze Counseling:  I discussed with the patient the risks of Solaraze including but not limited to erythema, scaling, itching, weeping, crusting, and pain.

## 2022-02-01 NOTE — PROGRESS NOTES
Working HTN report:     Called to discuss scheduling appointment and to get updated BP reading. No answer

## 2022-03-02 ENCOUNTER — PATIENT MESSAGE (OUTPATIENT)
Dept: RESEARCH | Facility: HOSPITAL | Age: 62
End: 2022-03-02
Payer: COMMERCIAL

## 2022-04-01 ENCOUNTER — PATIENT OUTREACH (OUTPATIENT)
Dept: ADMINISTRATIVE | Facility: HOSPITAL | Age: 62
End: 2022-04-01
Payer: COMMERCIAL

## 2023-08-29 NOTE — TELEPHONE ENCOUNTER
Patient called inquiring about Covid testing. She c/o diarrhea, chills, body aches, and abdominal pain. Also chest pain and sob only with coughing. Per protocol, will call the on call provider.    S/w Dr. Berumen via secure chat. Advised the patient to go to urgent care today or tomorrow. If symptoms worsen go to the ER. Advised to adhere to bland diet and hydration for diarrhea.    Patient updated. Advised the patient to call back with any further questions or if symptoms worsen.          Reason for Disposition   Chest pain    Additional Information   Negative: Severe difficulty breathing (e.g., struggling for each breath, speaks in single words)   Negative: Difficult to awaken or acting confused (e.g., disoriented, slurred speech)   Negative: Bluish (or gray) lips or face now   Negative: Shock suspected (e.g., cold/pale/clammy skin, too weak to stand, low BP, rapid pulse)   Negative: Sounds like a life-threatening emergency to the triager   Negative: [1] COVID-19 suspected (e.g., cough, fever, shortness of breath) AND [2] mild symptoms AND [3] public health department recommends testing   Negative: [1] COVID-19 exposure AND [2] no symptoms   Negative: COVID-19 and Breastfeeding, questions about   Negative: SEVERE or constant chest pain (Exception: mild central chest pain, present only when coughing)   Negative: MODERATE difficulty breathing (e.g., speaks in phrases, SOB even at rest, pulse 100-120)   Negative: Patient sounds very sick or weak to the triager   Negative: MILD difficulty breathing (e.g., minimal/no SOB at rest, SOB with walking, pulse <100)    Protocols used: CORONAVIRUS (COVID-19) - DIAGNOSED OR [id number]       Please let patient know that the placing of the stent in the cyst (cyst gastrostomy) will depend on if there is a good window for this, we will not know until time of the procedure.  This will only drain the cyst.  Will also plan on ERCP. The ERCP is to assess if there is a leak in the pancreatic duct, causing the cyst to increase in size.  If able we can place a stent to allow the leak to heal.

## 2023-12-03 NOTE — TELEPHONE ENCOUNTER
----- Message from Ann Lewis sent at 5/1/2019  9:09 AM CDT -----  .Type:  Patient Requesting Referral    Who Called:self  Does the patient already have the specialty appointment scheduled?:no  If yes, what is the date of that appointment?:  Referral to What Specialty:diabetes  Reason for Referral:weight management  Does the patient want the referral with a specific physician?:no  [facility]  Patient Requesting a Response?:yes  Would the patient rather a call back or a response via [facility]? call  Best Call Back Number:.[phone]  Additional Information:   Neuro Patient/Caregiver provided printed discharge information.